# Patient Record
Sex: MALE | Race: WHITE | NOT HISPANIC OR LATINO | Employment: STUDENT | URBAN - METROPOLITAN AREA
[De-identification: names, ages, dates, MRNs, and addresses within clinical notes are randomized per-mention and may not be internally consistent; named-entity substitution may affect disease eponyms.]

---

## 2017-10-20 ENCOUNTER — OFFICE VISIT (OUTPATIENT)
Dept: INTERNAL MEDICINE | Facility: CLINIC | Age: 32
End: 2017-10-20

## 2017-10-20 ENCOUNTER — HOSPITAL ENCOUNTER (OUTPATIENT)
Dept: GENERAL RADIOLOGY | Facility: HOSPITAL | Age: 32
Discharge: HOME | End: 2017-10-20
Admitting: FAMILY MEDICINE

## 2017-10-20 VITALS
SYSTOLIC BLOOD PRESSURE: 120 MMHG | BODY MASS INDEX: 35.68 KG/M2 | WEIGHT: 287 LBS | RESPIRATION RATE: 18 BRPM | OXYGEN SATURATION: 99 % | HEIGHT: 75 IN | DIASTOLIC BLOOD PRESSURE: 80 MMHG

## 2017-10-20 DIAGNOSIS — G47.00 INSOMNIA, UNSPECIFIED TYPE: ICD-10-CM

## 2017-10-20 DIAGNOSIS — F41.8 ANXIETY WITH DEPRESSION: ICD-10-CM

## 2017-10-20 DIAGNOSIS — G47.33 OBSTRUCTIVE SLEEP APNEA: ICD-10-CM

## 2017-10-20 DIAGNOSIS — M54.50 CHRONIC MIDLINE LOW BACK PAIN WITHOUT SCIATICA: Primary | ICD-10-CM

## 2017-10-20 DIAGNOSIS — G89.29 CHRONIC MIDLINE LOW BACK PAIN WITHOUT SCIATICA: Primary | ICD-10-CM

## 2017-10-20 DIAGNOSIS — Z23 NEED FOR INFLUENZA VACCINATION: ICD-10-CM

## 2017-10-20 PROCEDURE — 99204 OFFICE O/P NEW MOD 45 MIN: CPT | Performed by: FAMILY MEDICINE

## 2017-10-20 PROCEDURE — 72110 X-RAY EXAM L-2 SPINE 4/>VWS: CPT

## 2017-10-20 PROCEDURE — 90471 IMMUNIZATION ADMIN: CPT | Performed by: FAMILY MEDICINE

## 2017-10-20 PROCEDURE — 90686 IIV4 VACC NO PRSV 0.5 ML IM: CPT | Performed by: FAMILY MEDICINE

## 2017-10-20 RX ORDER — ALPRAZOLAM 1 MG/1
TABLET ORAL
Refills: 0 | COMMUNITY
Start: 2017-08-30 | End: 2018-07-13

## 2017-10-20 RX ORDER — DEXTROAMPHETAMINE SACCHARATE, AMPHETAMINE ASPARTATE, DEXTROAMPHETAMINE SULFATE AND AMPHETAMINE SULFATE 7.5; 7.5; 7.5; 7.5 MG/1; MG/1; MG/1; MG/1
TABLET ORAL
Refills: 0 | COMMUNITY
Start: 2017-10-04 | End: 2019-02-21 | Stop reason: DRUGHIGH

## 2017-10-20 RX ORDER — ZOLPIDEM TARTRATE 10 MG/1
TABLET ORAL
Refills: 2 | COMMUNITY
Start: 2017-10-13 | End: 2019-03-05 | Stop reason: SDUPTHER

## 2017-10-20 RX ORDER — BUPROPION HYDROCHLORIDE 300 MG/1
TABLET ORAL
Refills: 1 | COMMUNITY
Start: 2017-10-01 | End: 2019-02-21

## 2017-10-20 RX ORDER — HYDROCODONE BITARTRATE AND ACETAMINOPHEN 10; 325 MG/1; MG/1
TABLET ORAL
Refills: 0 | COMMUNITY
Start: 2017-09-12 | End: 2019-02-21

## 2017-10-20 NOTE — PROGRESS NOTES
Subjective   Sergio Lucero is a 32 y.o. male who presents to the clinic to Establish Care      History of Present Illness  Denies having a recent PCP.    Specialists include: Dr. Burnett (psychiatrist), Dr. Henderson (pain specialist), was previously seeing a sleep specialist in [place]  Prescription medications include: Wellbutrin, Ambien, Adderall, Norco, Xanax  OTC medications include: vitamin C    Patient presents to establish care and for chronic disease management.  Past medical history is significant for chronic ADHD (diagnosed in 01/2013), sleep apnea (diagnosed in 06/2014, using CPAP), lumbar back pain (spring 2013), anxiety with depression (diagnosed in 2014/2015, has intermittent panic attacks).  Reports medication compliance and denies any intolerable side effects.  For his anxiety, depression, ADHD and insomnia, reports controlled symptoms on his current regimen.  Denies appetite, weight or libido changes, SI or HI.  Has a follow-up appointment scheduled with his psychiatrist on Monday.  Reports that his insurance recently changed so he is requesting a new referral today.    For his chronic back pain, reports a history of lumbar disc herniation at L4-L5.  Last MRI was done on 09/23/17 and states that his pain specialist also wanted a lumbar xray done. Has done physical therapy and seen a chiropractor in the past, which did not significantly help.  Overall, back pain is well controlled on his current regimen and states that he only takes his Norco 3 times a week.  Denies associated lower extremity weakness, numbness, urinary or bowel incontinence.  Has a follow-up appointment scheduled next Tuesday with his pain specialist.  Reports that his insurance recently changed so he is requesting a new referral today.      For his sleep apnea, has been using his CPAP as previously directed and reports improved sleep and fatigue.  Requesting a referral to a new provider today.    Review of Systems   Constitutional:  Negative for chills, fatigue and fever.   HENT: Negative for congestion, ear pain, rhinorrhea, sinus pressure and sore throat.    Eyes: Negative for pain and visual disturbance.   Respiratory: Negative for cough and shortness of breath.    Cardiovascular: Negative for chest pain and palpitations.   Gastrointestinal: Negative for abdominal pain, constipation, diarrhea, nausea and vomiting.   Genitourinary: Negative for decreased urine volume, dysuria and hematuria.   Musculoskeletal: Positive for back pain (chronic, stable). Negative for gait problem.   Skin: Negative for pallor and rash.   Neurological: Negative for dizziness, syncope and headaches.        Negative for bowel or urinary incontinence.   Psychiatric/Behavioral: Positive for decreased concentration (improved) and sleep disturbance (insomnia, improved). The patient is nervous/anxious (improved).         Positive for depressed mood, improved.     All other systems reviewed and are negative.    Past Medical History:   Diagnosis Date   • ADHD (attention deficit hyperactivity disorder)    • Anxiety    • Depression    • History of ankle fracture     RIGHT   • History of arm fracture     BOTH at separate times   • Lumbar disc herniation     L4-L5   • Obstructive sleep apnea     on CPAP       Family History   Problem Relation Age of Onset   • Heart attack Father 56   • No Known Problems Mother    • No Known Problems Daughter    • No Known Problems Son    • No Known Problems Maternal Grandmother    • No Known Problems Maternal Grandfather    • No Known Problems Paternal Grandmother    • No Known Problems Paternal Grandfather        Past Surgical History:   Procedure Laterality Date   • ADENOIDECTOMY  2009   • MENISCECTOMY Right    • TONSILLECTOMY  2009   • WISDOM TOOTH EXTRACTION  1998       Social History     Social History   • Marital status:      Spouse name: N/A   • Number of children: 4   • Years of education: N/A     Occupational History   • Not on  "file.     Social History Main Topics   • Smoking status: Never Smoker   • Smokeless tobacco: Never Used   • Alcohol use No   • Drug use: No   • Sexual activity: Yes     Partners: Female     Other Topics Concern   • Not on file     Social History Narrative   • No narrative on file         Current Outpatient Prescriptions:   •  ALPRAZolam (XANAX) 1 MG tablet, TAKE 1 TABLET BY MOUTH TWICE A DAY, Disp: , Rfl: 0  •  amphetamine-dextroamphetamine (ADDERALL) 30 MG tablet, TAKE 1/2 TABLET BY MOUTH TWICE A DAY, Disp: , Rfl: 0  •  buPROPion XL (WELLBUTRIN XL) 300 MG 24 hr tablet, TAKE 1 TABLET BY MOUTH DAILY IN THE MORNING, Disp: , Rfl: 1  •  HYDROcodone-acetaminophen (NORCO)  MG per tablet, TAKE 1 TABLET BY MOUTH EVERY DAY AS NEEDED, Disp: , Rfl: 0  •  zolpidem (AMBIEN) 10 MG tablet, TAKE 1 TABLET BY MOUTH AT BEDTIME, Disp: , Rfl: 2    Objective   /80  Resp 18  Ht 75\" (190.5 cm)  Wt 287 lb (130 kg)  SpO2 99%  BMI 35.87 kg/m2     Physical Exam   Constitutional: He is oriented to person, place, and time. He appears well-developed and well-nourished.   No acute distress.   HENT:   Head: Normocephalic and atraumatic.   Right Ear: External ear normal.   Left Ear: External ear normal.   Nose: Nose normal.   Mouth/Throat: Oropharynx is clear and moist.   Eyes: Conjunctivae and EOM are normal. Pupils are equal, round, and reactive to light.   Neck: Normal range of motion. Neck supple.   Cardiovascular: Normal rate, regular rhythm, normal heart sounds and intact distal pulses.    Pulmonary/Chest: Effort normal and breath sounds normal. No respiratory distress. He has no wheezes.   Abdominal: Soft. Bowel sounds are normal. There is no tenderness. There is no CVA tenderness.   Musculoskeletal: Normal range of motion.        Cervical back: Normal.        Thoracic back: Normal.        Lumbar back: He exhibits tenderness (lumbar spine). He exhibits normal range of motion, no swelling and no deformity.   Normal gait. "   Neurological: He is alert and oriented to person, place, and time. He has normal strength. No sensory deficit.   Skin: Skin is warm and dry.   Psychiatric: He has a normal mood and affect. His behavior is normal. Judgment and thought content normal.   Vitals reviewed.      Assessment/Plan   Sergio was seen today for establish care.    Diagnoses and all orders for this visit:    Chronic midline low back pain without sciatica  -     Ambulatory Referral to Pain Management  -     XR Spine Lumbar 4+ View    Will order a referral for pain management and a lumbar spine xray per patient request.    Obstructive sleep apnea  -     Ambulatory Referral to Sleep Medicine    Will order a referral for sleep medicine today to aid with further evaluation and management.    Anxiety with depression  -     Ambulatory Referral to Psychiatry    Will order a referral for psychiatry per patient request.    Insomnia, unspecified type  -     Ambulatory Referral to Psychiatry    Will order a referral for psychiatry per patient request.    Need for influenza vaccination  -     Flu Vaccine Quad PF >18YR

## 2017-10-20 NOTE — PATIENT INSTRUCTIONS
It was nice meeting you today!  I look forward to getting to know you and helping you with your primary care needs.  I have ordered referrals for pain management, psychiatry and a sleep specialist for you.    Please keep your upcoming appointments with pain management and psychiatry.  You will be called to set up an appointment with your sleep specialist.  I have ordered a lumbar spine xray for you.  Please go to the [facility] [place], [place] at [address] to get this done.  You do not need an appointment.  Recommend that you sign up for My Chart (our patient portal).  You will be able to access lab results, request appointments and send our office messages.  If you are interested, please ask for My Chart access information at the check out desk.  Please schedule an appointment for your annual physical exam (if you have not already had one for this year) at your earliest convenience.  Please follow-up as indicated.  Return to the clinic sooner if you have any other concerns.

## 2017-10-24 ENCOUNTER — TELEPHONE (OUTPATIENT)
Dept: INTERNAL MEDICINE | Facility: CLINIC | Age: 32
End: 2017-10-24

## 2017-10-24 NOTE — TELEPHONE ENCOUNTER
Pt was not able to find his report and x ray on my chart so he wants you to print it off so that it can be picked up this morning. Pt needs for pain management this morning before 9:30.

## 2017-12-11 ENCOUNTER — TRANSCRIBE ORDERS (OUTPATIENT)
Dept: ADMINISTRATIVE | Facility: HOSPITAL | Age: 32
End: 2017-12-11

## 2017-12-11 ENCOUNTER — HOSPITAL ENCOUNTER (OUTPATIENT)
Dept: GENERAL RADIOLOGY | Facility: HOSPITAL | Age: 32
Discharge: HOME | End: 2017-12-11
Admitting: PAIN MEDICINE

## 2017-12-11 DIAGNOSIS — M43.16 SPONDYLOLISTHESIS OF LUMBAR REGION: ICD-10-CM

## 2017-12-11 DIAGNOSIS — M47.814 THORACIC SPONDYLOSIS WITHOUT MYELOPATHY: Primary | ICD-10-CM

## 2017-12-11 PROCEDURE — 72120 X-RAY BEND ONLY L-S SPINE: CPT

## 2017-12-11 PROCEDURE — 72072 X-RAY EXAM THORAC SPINE 3VWS: CPT

## 2017-12-18 ENCOUNTER — OFFICE VISIT (OUTPATIENT)
Dept: INTERNAL MEDICINE | Facility: CLINIC | Age: 32
End: 2017-12-18

## 2017-12-18 VITALS
RESPIRATION RATE: 20 BRPM | DIASTOLIC BLOOD PRESSURE: 92 MMHG | HEIGHT: 75 IN | OXYGEN SATURATION: 99 % | SYSTOLIC BLOOD PRESSURE: 132 MMHG | HEART RATE: 79 BPM | BODY MASS INDEX: 34.82 KG/M2 | WEIGHT: 280 LBS

## 2017-12-18 DIAGNOSIS — Z00.00 HEALTH CARE MAINTENANCE: Primary | ICD-10-CM

## 2017-12-18 LAB
ALBUMIN SERPL-MCNC: 4.3 G/DL (ref 3.2–4.8)
ALBUMIN/GLOB SERPL: 1.7 G/DL (ref 1.5–2.5)
ALP SERPL-CCNC: 76 U/L (ref 25–100)
ALT SERPL W P-5'-P-CCNC: 45 U/L (ref 7–40)
ANION GAP SERPL CALCULATED.3IONS-SCNC: 4 MMOL/L (ref 3–11)
ARTICHOKE IGE QN: 79 MG/DL (ref 0–130)
AST SERPL-CCNC: 34 U/L (ref 0–33)
BILIRUB SERPL-MCNC: 0.2 MG/DL (ref 0.3–1.2)
BUN BLD-MCNC: 10 MG/DL (ref 9–23)
BUN/CREAT SERPL: 11.1 (ref 7–25)
CALCIUM SPEC-SCNC: 9.1 MG/DL (ref 8.7–10.4)
CHLORIDE SERPL-SCNC: 107 MMOL/L (ref 99–109)
CHOLEST SERPL-MCNC: 121 MG/DL (ref 0–200)
CO2 SERPL-SCNC: 30 MMOL/L (ref 20–31)
CREAT BLD-MCNC: 0.9 MG/DL (ref 0.6–1.3)
GFR SERPL CREATININE-BSD FRML MDRD: 97 ML/MIN/1.73
GLOBULIN UR ELPH-MCNC: 2.5 GM/DL
GLUCOSE BLD-MCNC: 97 MG/DL (ref 70–100)
HDLC SERPL-MCNC: 34 MG/DL (ref 40–60)
HIV1+2 AB SER QL: NORMAL
POTASSIUM BLD-SCNC: 4.3 MMOL/L (ref 3.5–5.5)
PROT SERPL-MCNC: 6.8 G/DL (ref 5.7–8.2)
SODIUM BLD-SCNC: 141 MMOL/L (ref 132–146)
TRIGL SERPL-MCNC: 107 MG/DL (ref 0–150)

## 2017-12-18 PROCEDURE — 99395 PREV VISIT EST AGE 18-39: CPT | Performed by: FAMILY MEDICINE

## 2017-12-18 PROCEDURE — 80061 LIPID PANEL: CPT | Performed by: FAMILY MEDICINE

## 2017-12-18 PROCEDURE — 80053 COMPREHEN METABOLIC PANEL: CPT | Performed by: FAMILY MEDICINE

## 2017-12-18 PROCEDURE — G0432 EIA HIV-1/HIV-2 SCREEN: HCPCS | Performed by: FAMILY MEDICINE

## 2017-12-18 NOTE — PATIENT INSTRUCTIONS
Please go to the lab before you leave to have your labs drawn.  You will be called or receive a letter with your results.  Things you can do to improve your overall health:  Work towards a normal body weight (BMI between 18.5 and 24.9).  Consume a diet rich in fruits, vegetables and low-fat dairy products with a reduced content of saturated and total fat.  Engage in regular aerobic physical activity, such as brisk walking (at least 30 minutes per day, most days of the week).  Recommend that you consider seeing a dermatologist for annual skin exams.  You will be called to reschedule your sleep specialist appointment.  Please follow-up as indicated.  Return to the clinic sooner if you have any other concerns.

## 2017-12-18 NOTE — PROGRESS NOTES
Subjective   Sergio Lucero is a 32 y.o. male who presents for his yearly preventive exam.  He has no complaints today.    His overall health is: good.  He exercises by walking (every day going to classes, about an hour cummulative).  Immunizations are up to date - Tdap reportedly done in 2013.  PSA was reviewed. He has no increased risk of prostate cancer.  He does see his dentist regularly.  His diet is rare fast food, trying to increase fruits and vegetables intake.  He describes his alcohol intake as none.  His cardiovascular risk is: LDL goal is under 130.  He is sexually active with his wife.  He does not have ED or other bedroom issues.  He does wear a seatbelt regularly.  He does not wear sunscreen regularly, counseled.    The following portions of the patient's history were reviewed and updated as appropriate: allergies, past medical history, past family history, surgeries, current medications, past social history and problem list.    Review of Systems   Constitutional: Negative for chills, fatigue and fever.   HENT: Negative for congestion, ear pain, rhinorrhea, sinus pressure and sore throat.    Eyes: Negative for pain and visual disturbance.   Respiratory: Negative for cough and shortness of breath.    Cardiovascular: Negative for chest pain and palpitations.   Gastrointestinal: Negative for abdominal pain, constipation, diarrhea, nausea and vomiting.   Genitourinary: Negative for decreased urine volume, dysuria and hematuria.   Musculoskeletal: Negative for back pain and gait problem.   Skin: Negative for pallor and rash.   Neurological: Negative for dizziness, syncope and headaches.   Psychiatric/Behavioral: The patient is not nervous/anxious.         Negative for depressed mood.       Past Medical History:   Diagnosis Date   • ADHD (attention deficit hyperactivity disorder)    • Anxiety    • Depression    • History of ankle fracture     RIGHT   • History of arm fracture     BOTH at separate times   •  "Lumbar disc herniation     L4-L5   • Obstructive sleep apnea     on CPAP       Family History   Problem Relation Age of Onset   • Heart attack Father 56   • No Known Problems Mother    • No Known Problems Daughter    • No Known Problems Son    • No Known Problems Maternal Grandmother    • No Known Problems Maternal Grandfather    • No Known Problems Paternal Grandmother    • No Known Problems Paternal Grandfather        Past Surgical History:   Procedure Laterality Date   • ADENOIDECTOMY  2009   • MENISCECTOMY Right    • TONSILLECTOMY  2009   • WISDOM TOOTH EXTRACTION  1998       Social History     Social History   • Marital status:      Spouse name: N/A   • Number of children: 4   • Years of education: N/A     Occupational History   • Not on file.     Social History Main Topics   • Smoking status: Never Smoker   • Smokeless tobacco: Never Used   • Alcohol use No   • Drug use: No   • Sexual activity: Yes     Partners: Female     Other Topics Concern   • Not on file     Social History Narrative   • No narrative on file         Current Outpatient Prescriptions:   •  ALPRAZolam (XANAX) 1 MG tablet, TAKE 1 TABLET BY MOUTH TWICE A DAY, Disp: , Rfl: 0  •  amphetamine-dextroamphetamine (ADDERALL) 30 MG tablet, TAKE 1/2 TABLET BY MOUTH TWICE A DAY, Disp: , Rfl: 0  •  buPROPion XL (WELLBUTRIN XL) 300 MG 24 hr tablet, TAKE 1 TABLET BY MOUTH DAILY IN THE MORNING, Disp: , Rfl: 1  •  HYDROcodone-acetaminophen (NORCO)  MG per tablet, TAKE 1 TABLET BY MOUTH EVERY DAY AS NEEDED, Disp: , Rfl: 0  •  zolpidem (AMBIEN) 10 MG tablet, TAKE 1 TABLET BY MOUTH AT BEDTIME, Disp: , Rfl: 2    Objective   /92  Pulse 79  Resp 20  Ht 190.5 cm (75\")  Wt 127 kg (280 lb)  SpO2 99%  BMI 35 kg/m2     Physical Exam   Constitutional: He is oriented to person, place, and time. He appears well-developed and well-nourished.   No acute distress.   HENT:   Head: Normocephalic and atraumatic.   Right Ear: Hearing, tympanic membrane, " external ear and ear canal normal.   Left Ear: Hearing, tympanic membrane, external ear and ear canal normal.   Nose: Nose normal.   Mouth/Throat: Oropharynx is clear and moist and mucous membranes are normal.   Eyes: Conjunctivae and EOM are normal. Pupils are equal, round, and reactive to light.   Neck: Normal range of motion. Neck supple. No thyromegaly present.   Cardiovascular: Normal rate, regular rhythm, normal heart sounds and intact distal pulses.    Pulmonary/Chest: Effort normal and breath sounds normal. No respiratory distress. He has no wheezes.   Abdominal: Soft. Bowel sounds are normal. There is no tenderness. There is no CVA tenderness and negative Thurston's sign.   Genitourinary:   Genitourinary Comments: Deferred.   Musculoskeletal: Normal range of motion.        Cervical back: Normal.        Thoracic back: Normal.        Lumbar back: Normal.   Normal gait.   Neurological: He is alert and oriented to person, place, and time. He has normal strength. No cranial nerve deficit or sensory deficit.   Skin: Skin is warm and dry.   Psychiatric: He has a normal mood and affect. His behavior is normal. Judgment and thought content normal.   Vitals reviewed.      Assessment/Plan   Sergio was seen today for annual exam.    Diagnoses and all orders for this visit:    Health care maintenance  -     Comprehensive Metabolic Panel  -     HIV-1 / O / 2 Ag / Antibody 4th Generation  -     Lipid Panel    The above labs were ordered today.  Immunizations are up to date.  Next annual exam in 1 year.    Counseled regarding: age-appropriate screening labs and tests, wearing seatbelt and sunscreen regularly  Discussed: regular exercise and diet changes to promote weight loss, seeing a dermatologist for annual skin exams

## 2018-02-09 ENCOUNTER — CONSULT (OUTPATIENT)
Dept: SLEEP MEDICINE | Facility: HOSPITAL | Age: 33
End: 2018-02-09

## 2018-02-09 VITALS
HEART RATE: 98 BPM | WEIGHT: 285 LBS | HEIGHT: 75 IN | BODY MASS INDEX: 35.43 KG/M2 | DIASTOLIC BLOOD PRESSURE: 72 MMHG | OXYGEN SATURATION: 97 % | SYSTOLIC BLOOD PRESSURE: 118 MMHG

## 2018-02-09 DIAGNOSIS — G47.33 OBSTRUCTIVE SLEEP APNEA: Primary | ICD-10-CM

## 2018-02-09 DIAGNOSIS — E66.09 CLASS 2 OBESITY DUE TO EXCESS CALORIES WITHOUT SERIOUS COMORBIDITY WITH BODY MASS INDEX (BMI) OF 35.0 TO 35.9 IN ADULT: ICD-10-CM

## 2018-02-09 PROCEDURE — 99203 OFFICE O/P NEW LOW 30 MIN: CPT | Performed by: INTERNAL MEDICINE

## 2018-02-09 RX ORDER — LISDEXAMFETAMINE DIMESYLATE 60 MG/1
CAPSULE ORAL
Refills: 0 | COMMUNITY
Start: 2018-01-11 | End: 2019-02-21 | Stop reason: DRUGHIGH

## 2018-02-09 NOTE — PROGRESS NOTES
Subjective   Sergio Lucero is a 32 y.o. male is being seen for consultation today at the request of Jaimee Adames MD for the evaluation of snoring and obstructive sleep apnea.    History of Present Illness  The patient says his had snoring almost all of his life.  He had a sleep study in [place] in 2014 diagnosed with obstructive sleep apnea.  He has been on CPAP since then.  He says he does very well with the CPAP and uses it nightly.  Never tries to sleep without it.  He denies having any known snoring when he is wearing the mask.  He says his weight is roughly the same.  He moved to [place] a year ago and needs new supplies.  He denies being sleepy during the day.  He denies any problems while driving.    Without machine he does snore loudly and snores in all positions he is awakened with a dry mouth and gasping.  He awakened with a sore throat.  He denies ever breaking his nose.  He has a history of reflux but says he has been a problem recently.  He is not on medications.  He denies hypnagogic hallucinations sleep paralysis or cataplexy.  He denies kicking and jerking his legs at night.  He does have some chronic back pain and is seen by pain management.    He goes to bed at 10 PM will fall asleep within 30 minutes to an hour.  He awakens once during the night.  He thinks he gets about 7 hours of sleep.  He says he feels tired even using machine but attributes this to having 4 young children in the household.  He denies any history of hypertension diabetes coronary artery disease.  Does have history of ADHD.      He has food allergies and environmental allergies.      Current Outpatient Prescriptions:   •  ALPRAZolam (XANAX) 1 MG tablet, TAKE 1 TABLET BY MOUTH TWICE A DAY, Disp: , Rfl: 0  •  amphetamine-dextroamphetamine (ADDERALL) 30 MG tablet, TAKE 1/2 TABLET BY MOUTH TWICE A DAY, Disp: , Rfl: 0  •  buPROPion XL (WELLBUTRIN XL) 300 MG 24 hr tablet, TAKE 1 TABLET BY MOUTH DAILY IN THE MORNING, Disp: , Rfl:  1  •  HYDROcodone-acetaminophen (NORCO)  MG per tablet, TAKE 1 TABLET BY MOUTH EVERY DAY AS NEEDED, Disp: , Rfl: 0  •  VYVANSE 60 MG capsule, TAKE ONE CAPSULE BY MOUTH EVERY DAY, Disp: , Rfl: 0  •  zolpidem (AMBIEN) 10 MG tablet, TAKE 1 TABLET BY MOUTH AT BEDTIME, Disp: , Rfl: 2    Smoking status: Never Smoker                                                              Smokeless status: Never Used                           History   Alcohol Use No       Caffeine:He has one cola Per day.    Past Medical History:   Diagnosis Date   • ADHD (attention deficit hyperactivity disorder)    • Anxiety    • Depression    • History of ankle fracture     RIGHT   • History of arm fracture     BOTH at separate times   • Lumbar disc herniation     L4-L5   • Obstructive sleep apnea     on CPAP       Past Surgical History:   Procedure Laterality Date   • ADENOIDECTOMY  2009   • MENISCECTOMY Right    • TONSILLECTOMY  2009   • WISDOM TOOTH EXTRACTION  1998       Family History   Problem Relation Age of Onset   • Heart attack Father 56   • No Known Problems Mother    • No Known Problems Daughter    • No Known Problems Son    • No Known Problems Maternal Grandmother    • No Known Problems Maternal Grandfather    • No Known Problems Paternal Grandmother    • No Known Problems Paternal Grandfather    Family history is positive for heart disease and hypertension as well as sleep apnea.    The following portions of the patient's history were reviewed and updated as appropriate: allergies, current medications, past family history, past medical history, past social history, past surgical history and problem list.    Review of Systems   Constitutional: Positive for fatigue.   HENT: Negative.    Eyes: Negative.    Respiratory: Negative.    Cardiovascular: Negative.    Gastrointestinal: Negative.    Endocrine: Negative.    Genitourinary: Negative.    Musculoskeletal: Positive for back pain.   Skin: Negative.    Allergic/Immunologic:  "Positive for environmental allergies.   Neurological: Negative.    Hematological: Negative.    Psychiatric/Behavioral: Positive for dysphoric mood. The patient is nervous/anxious.     [place] scores 3/24    Objective     /72  Pulse 98  Ht 190.5 cm (75\")  Wt 129 kg (285 lb)  SpO2 97%  BMI 35.62 kg/m2     Physical Exam   Constitutional: He is oriented to person, place, and time. He appears well-developed and well-nourished.   He is obese.   HENT:   Head: Normocephalic and atraumatic.   He has nasal airway narrowing and Mallampati class II anatomy.   Eyes: EOM are normal. Pupils are equal, round, and reactive to light.   Neck: Normal range of motion.   Cardiovascular: Normal rate, regular rhythm and normal heart sounds.    Pulmonary/Chest: Effort normal and breath sounds normal.   Abdominal: Soft. Bowel sounds are normal.   Musculoskeletal: Normal range of motion. He exhibits no edema.   Neurological: He is alert and oriented to person, place, and time.   Skin: Skin is warm and dry.   Psychiatric: He has a normal mood and affect. His behavior is normal.    sleep study from 2014 showed an AHI of 28.  He had a titration study then that titrated to CPAP of 9.  He was has been on an AutoSet of 6-12.      Assessment/Plan   Sergio was seen today for sleeping problem.    Diagnoses and all orders for this visit:    Obstructive sleep apnea  -     Detailed AutoPAP Order    Class 2 obesity due to excess calories without serious comorbidity with body mass index (BMI) of 35.0 to 35.9 in adult     patient apparently does have moderate obstructive sleep apnea but is been using his CPAP regularly.  We will encourage him to continue using it.  We will write an order for new supplies and continue on his current pressures.  We'll plan to see him back in 1 year.  He is encouraged to lose weight.  He is encouraged to avoid alcohol and sedatives close to bedtime.  He is encouraged practice lateral position sleep.  He is to contact " us if symptoms worsen.         Sergio Lorenz MD [facility]  Sleep Medicine  Pulmonary and Critical Care Medicine

## 2018-07-13 ENCOUNTER — OFFICE VISIT (OUTPATIENT)
Dept: INTERNAL MEDICINE | Facility: CLINIC | Age: 33
End: 2018-07-13

## 2018-07-13 VITALS
HEIGHT: 75 IN | RESPIRATION RATE: 16 BRPM | DIASTOLIC BLOOD PRESSURE: 80 MMHG | WEIGHT: 296 LBS | BODY MASS INDEX: 36.8 KG/M2 | SYSTOLIC BLOOD PRESSURE: 118 MMHG

## 2018-07-13 DIAGNOSIS — G89.29 CHRONIC MIDLINE THORACIC BACK PAIN: Primary | ICD-10-CM

## 2018-07-13 DIAGNOSIS — M54.6 CHRONIC MIDLINE THORACIC BACK PAIN: Primary | ICD-10-CM

## 2018-07-13 PROCEDURE — 99213 OFFICE O/P EST LOW 20 MIN: CPT | Performed by: FAMILY MEDICINE

## 2018-07-13 NOTE — PATIENT INSTRUCTIONS
I have ordered an orthopedic surgery referral for you.  You will be called to set up an appointment with this specialist.  Please follow-up as indicated.  Return to the clinic sooner if you have any other concerns.

## 2018-07-13 NOTE — PROGRESS NOTES
"Subjective   Sergio Lucero is a 33 y.o. male who presents with complaint of Back Pain      History of Present Illness   Patient presents with complaint of worsening, chronic upper back pain.  Symptoms initially started 5 years ago.  Describes a localized midline pain, non-radiating, worsens with sitting too long, bending down, going from a sitting to standing position.  Currently taking Norco and states that it helps for a few hours, but then pain will recur.  Has done physical therapy in the past, which did not significantly help.  Denies associated swelling, bruising, erythema, extremity weakness or numbness.  Has been seeing a pain specialist for his chronic lower back pain, which has improved, but noticed worsening upper back pain afterwards.  Patient is requesting to see an orthopedic surgeon for further evaluation.  Had a thoracic spine xray done on 12/11/17, which was unremarkable.    Review of Systems   Constitutional: Negative for chills and fever.   Cardiovascular: Negative for chest pain.   Gastrointestinal: Negative for abdominal pain, constipation, diarrhea, nausea and vomiting.   Genitourinary: Negative for dysuria, flank pain and hematuria.   Musculoskeletal: Positive for back pain.   Skin: Negative for color change.   Neurological: Negative for weakness, numbness and headaches.        Negative for urinary or bowel incontinence.     The following portions of the patient's history were reviewed and updated as appropriate: current medications, past medical history, past surgical history and problem list.    Objective   /80   Resp 16   Ht 190.5 cm (75\")   Wt 134 kg (296 lb)   BMI 37.00 kg/m²      Physical Exam   Constitutional: He is oriented to person, place, and time. He appears well-developed and well-nourished.   No acute distress.   HENT:   Head: Normocephalic and atraumatic.   Eyes: Conjunctivae and EOM are normal.   Neck: Normal range of motion.   Cardiovascular: Normal rate, regular " rhythm, normal heart sounds and intact distal pulses.    Pulmonary/Chest: Effort normal and breath sounds normal. He has no wheezes.   Abdominal: Soft. There is no tenderness.   Musculoskeletal: Normal range of motion.        Thoracic back: He exhibits tenderness. He exhibits normal range of motion, no swelling, no edema and no deformity.   Moves all extremities.   Neurological: He is alert and oriented to person, place, and time. He has normal strength. No sensory deficit.   Skin: Skin is warm and dry.   Psychiatric: He has a normal mood and affect. His behavior is normal.   Vitals reviewed.      Assessment/Plan   Sergio was seen today for back pain.    Diagnoses and all orders for this visit:    Chronic midline thoracic back pain  -     Ambulatory Referral to Orthopedic Surgery    Will order a referral for orthopedic surgery today, per patient request, to aid with further evaluation and management.

## 2019-02-21 ENCOUNTER — OFFICE VISIT (OUTPATIENT)
Dept: INTERNAL MEDICINE | Facility: CLINIC | Age: 34
End: 2019-02-21

## 2019-02-21 VITALS
BODY MASS INDEX: 36.31 KG/M2 | WEIGHT: 292 LBS | SYSTOLIC BLOOD PRESSURE: 130 MMHG | DIASTOLIC BLOOD PRESSURE: 82 MMHG | TEMPERATURE: 97.2 F | HEIGHT: 75 IN

## 2019-02-21 DIAGNOSIS — F41.8 ANXIETY WITH DEPRESSION: Primary | ICD-10-CM

## 2019-02-21 DIAGNOSIS — G47.00 INSOMNIA, UNSPECIFIED TYPE: ICD-10-CM

## 2019-02-21 DIAGNOSIS — F90.9 ATTENTION DEFICIT HYPERACTIVITY DISORDER (ADHD), UNSPECIFIED ADHD TYPE: ICD-10-CM

## 2019-02-21 DIAGNOSIS — Z23 INFLUENZA VACCINATION ADMINISTERED AT CURRENT VISIT: ICD-10-CM

## 2019-02-21 PROCEDURE — 90686 IIV4 VACC NO PRSV 0.5 ML IM: CPT | Performed by: INTERNAL MEDICINE

## 2019-02-21 PROCEDURE — 99214 OFFICE O/P EST MOD 30 MIN: CPT | Performed by: INTERNAL MEDICINE

## 2019-02-21 PROCEDURE — 90471 IMMUNIZATION ADMIN: CPT | Performed by: INTERNAL MEDICINE

## 2019-02-21 RX ORDER — DEXTROAMPHETAMINE SACCHARATE, AMPHETAMINE ASPARTATE, DEXTROAMPHETAMINE SULFATE AND AMPHETAMINE SULFATE 5; 5; 5; 5 MG/1; MG/1; MG/1; MG/1
1 TABLET ORAL
Refills: 0 | COMMUNITY
Start: 2019-02-15 | End: 2019-03-15 | Stop reason: SDUPTHER

## 2019-02-21 RX ORDER — BUPROPION HYDROCHLORIDE 150 MG/1
150 TABLET ORAL EVERY MORNING
COMMUNITY
End: 2019-03-05 | Stop reason: SDUPTHER

## 2019-02-21 RX ORDER — HYDROCODONE BITARTRATE AND ACETAMINOPHEN 10; 325 MG/1; MG/1
2 TABLET ORAL 2 TIMES DAILY PRN
COMMUNITY

## 2019-02-21 NOTE — PROGRESS NOTES
"Subjective   Sergio Lucero is a 33 y.o. male here for follow-up A&D, insomnia, ADD. He had been seeing a psychiatrist but he has gone to inpt only. He is going to  psych right now but doesn't really care for the clinic. He has been on vyvanse which was increased from 60 to 70mg last fall and the extender adderall 20mg which he takes a few times a week. Doesn't take on weekends or holidays/vacations. He takes ambien for insomnia which works well. No daytime somnolence. He also has A&D and is on wellbutrin which was also decreased recently as his mood was so good. He has 4 kids from 3yo down. 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 Has his ADRIENNE.      The following portions of the patient's history were reviewed and updated as appropriate: allergies, current medications, past medical history, past social history, past surgical history and problem list.    Review of Systems:  General: negative  CV: negative  Respiratory: negative  Neuro: negative  Psych: see hpi  MSK: chronic back pain    Objective   /82 (BP Location: Left arm, Patient Position: Sitting, Cuff Size: Adult)   Temp 97.2 °F (36.2 °C) (Temporal)   Ht 190.5 cm (75\")   Wt 132 kg (292 lb)   BMI 36.50 kg/m²     Physical Exam   Constitutional: He is oriented to person, place, and time. He appears well-developed and well-nourished.   Cardiovascular: Normal rate, regular rhythm and normal heart sounds.   Pulmonary/Chest: Effort normal and breath sounds normal. He has no wheezes. He has no rales.   Neurological: He is alert and oriented to person, place, and time.   Skin: Skin is warm and dry.   Psychiatric: He has a normal mood and affect. His behavior is normal. Thought content normal.   Vitals reviewed.      Assessment/Plan   Sergio was seen today for anxiety, depression and adhd.    Diagnoses and all orders for this visit:    Anxiety with depression  -continue wellbutrin    Attention deficit hyperactivity disorder (ADHD), unspecified ADHD type  -continue " vyvanse+adderall extender  The patient has read and signed the [facility] Controlled Substance Contract.  I will continue to see patient for regular follow up appointments.  They are well controlled on their medication.  QUEENIE is updated every 3 months. The patient is aware of the potential for addiction and dependence.    Insomnia, unspecified type  -continue ambien    Influenza vaccination administered at current visit  -     Fluarix/Fluzone/Afluria Quad/FluLaval Quad

## 2019-03-04 ENCOUNTER — OFFICE VISIT (OUTPATIENT)
Dept: INTERNAL MEDICINE | Facility: CLINIC | Age: 34
End: 2019-03-04

## 2019-03-04 VITALS — HEIGHT: 75 IN | WEIGHT: 292 LBS | TEMPERATURE: 97.8 F | BODY MASS INDEX: 36.31 KG/M2

## 2019-03-04 DIAGNOSIS — G47.00 INSOMNIA, UNSPECIFIED TYPE: ICD-10-CM

## 2019-03-04 DIAGNOSIS — M54.50 CHRONIC MIDLINE LOW BACK PAIN WITHOUT SCIATICA: Primary | ICD-10-CM

## 2019-03-04 DIAGNOSIS — F98.8 ATTENTION DEFICIT DISORDER (ADD) WITHOUT HYPERACTIVITY: ICD-10-CM

## 2019-03-04 DIAGNOSIS — G89.29 CHRONIC MIDLINE LOW BACK PAIN WITHOUT SCIATICA: Primary | ICD-10-CM

## 2019-03-04 PROCEDURE — 99213 OFFICE O/P EST LOW 20 MIN: CPT | Performed by: INTERNAL MEDICINE

## 2019-03-04 NOTE — PROGRESS NOTES
"Subjective   Sergio Lucero is a 33 y.o. male here for follow-up chronic back pain. He has had this for several years and is established with pain mgmt. He recently trialed a month of percocet but didn't like it so went back to [place]. He was concerned that his UDS from here showed \"inconsistencies\" and he wants to clarify. He brings his bottle in for review. Of note, he is doing well on his ambien and vyvanse for insomnia and ADD, respectively.       The following portions of the patient's history were reviewed and updated as appropriate: allergies, current medications, past medical history, past social history, past surgical history and problem list.    Review of Systems:  General: negative  CV: negative  Respiratory: negative  Neuro: negative  MSK: chronic back pain    Objective   Temp 97.8 °F (36.6 °C) (Temporal)   Ht 190.5 cm (75\")   Wt 132 kg (292 lb)   BMI 36.50 kg/m²     Physical Exam   Constitutional: He is oriented to person, place, and time. He appears well-developed and well-nourished.   Cardiovascular: Normal rate, regular rhythm and normal heart sounds.   Pulmonary/Chest: Effort normal and breath sounds normal. He has no wheezes. He has no rales.   Neurological: He is alert and oriented to person, place, and time.   Skin: Skin is warm and dry.   Psychiatric: He has a normal mood and affect. His behavior is normal. Thought content normal.   Vitals reviewed.      Assessment/Plan   Sergio was seen today for follow-up.    Diagnoses and all orders for this visit:    Chronic midline low back pain without sciatica  -continue Norco, seeing pain mgmt    Insomnia  -continue ambien    ADD  -continue Vyvanse  -queenie reviewed an appropriate.  The patient has read and signed the [facility] Controlled Substance Contract.  I will continue to see patient for regular follow up appointments.  They are well controlled on their medication.  QUEENIE is updated every 3 months. The patient is aware of the potential for " addiction and dependence.

## 2019-03-05 RX ORDER — ZOLPIDEM TARTRATE 10 MG/1
10 TABLET ORAL
Qty: 30 TABLET | Refills: 2 | Status: SHIPPED | OUTPATIENT
Start: 2019-03-05 | End: 2019-04-24 | Stop reason: SDUPTHER

## 2019-03-05 RX ORDER — BUPROPION HYDROCHLORIDE 150 MG/1
150 TABLET ORAL EVERY MORNING
Qty: 30 TABLET | Refills: 2 | Status: SHIPPED | OUTPATIENT
Start: 2019-03-05 | End: 2019-04-24 | Stop reason: SDUPTHER

## 2019-03-15 RX ORDER — DEXTROAMPHETAMINE SACCHARATE, AMPHETAMINE ASPARTATE, DEXTROAMPHETAMINE SULFATE AND AMPHETAMINE SULFATE 5; 5; 5; 5 MG/1; MG/1; MG/1; MG/1
1 TABLET ORAL DAILY
Qty: 30 TABLET | Refills: 0 | Status: SHIPPED | OUTPATIENT
Start: 2019-03-15 | End: 2019-04-14 | Stop reason: SDUPTHER

## 2019-04-16 RX ORDER — DEXTROAMPHETAMINE SACCHARATE, AMPHETAMINE ASPARTATE, DEXTROAMPHETAMINE SULFATE AND AMPHETAMINE SULFATE 5; 5; 5; 5 MG/1; MG/1; MG/1; MG/1
1 TABLET ORAL DAILY
Qty: 30 TABLET | Refills: 0 | Status: SHIPPED | OUTPATIENT
Start: 2019-04-16 | End: 2019-05-16 | Stop reason: SDUPTHER

## 2019-04-23 RX ORDER — BUPROPION HYDROCHLORIDE 150 MG/1
150 TABLET ORAL EVERY MORNING
Qty: 30 TABLET | Refills: 2 | Status: CANCELLED | OUTPATIENT
Start: 2019-04-23

## 2019-04-23 RX ORDER — ZOLPIDEM TARTRATE 10 MG/1
10 TABLET ORAL
Qty: 30 TABLET | Refills: 2 | Status: CANCELLED | OUTPATIENT
Start: 2019-04-23

## 2019-04-24 RX ORDER — ZOLPIDEM TARTRATE 10 MG/1
10 TABLET ORAL
Qty: 30 TABLET | Refills: 2 | Status: SHIPPED | OUTPATIENT
Start: 2019-04-24 | End: 2019-05-16 | Stop reason: SDUPTHER

## 2019-04-24 RX ORDER — BUPROPION HYDROCHLORIDE 150 MG/1
150 TABLET ORAL EVERY MORNING
Qty: 30 TABLET | Refills: 2 | Status: SHIPPED | OUTPATIENT
Start: 2019-04-24 | End: 2019-05-16

## 2019-05-16 ENCOUNTER — OFFICE VISIT (OUTPATIENT)
Dept: INTERNAL MEDICINE | Facility: CLINIC | Age: 34
End: 2019-05-16

## 2019-05-16 VITALS
BODY MASS INDEX: 36.18 KG/M2 | HEIGHT: 75 IN | SYSTOLIC BLOOD PRESSURE: 120 MMHG | DIASTOLIC BLOOD PRESSURE: 74 MMHG | WEIGHT: 291 LBS | HEART RATE: 72 BPM

## 2019-05-16 DIAGNOSIS — M47.817 LUMBOSACRAL SPONDYLOSIS WITHOUT MYELOPATHY: ICD-10-CM

## 2019-05-16 DIAGNOSIS — F33.1 MODERATE EPISODE OF RECURRENT MAJOR DEPRESSIVE DISORDER (HCC): Primary | ICD-10-CM

## 2019-05-16 DIAGNOSIS — F98.8 ATTENTION DEFICIT DISORDER (ADD) WITHOUT HYPERACTIVITY: Primary | ICD-10-CM

## 2019-05-16 DIAGNOSIS — F41.9 ANXIETY: ICD-10-CM

## 2019-05-16 DIAGNOSIS — F98.8 ADD (ATTENTION DEFICIT DISORDER) WITHOUT HYPERACTIVITY: ICD-10-CM

## 2019-05-16 DIAGNOSIS — G47.00 INSOMNIA, UNSPECIFIED TYPE: ICD-10-CM

## 2019-05-16 DIAGNOSIS — M47.814 THORACIC SPONDYLOSIS WITHOUT MYELOPATHY: ICD-10-CM

## 2019-05-16 RX ORDER — BUPROPION HYDROCHLORIDE 300 MG/1
300 TABLET ORAL EVERY MORNING
Qty: 30 TABLET | Refills: 2 | Status: SHIPPED | OUTPATIENT
Start: 2019-05-16 | End: 2019-09-10 | Stop reason: SDUPTHER

## 2019-05-16 RX ORDER — ZOLPIDEM TARTRATE 10 MG/1
10 TABLET ORAL
Qty: 30 TABLET | Refills: 0 | Status: SHIPPED | OUTPATIENT
Start: 2019-05-16 | End: 2019-10-11 | Stop reason: SDUPTHER

## 2019-05-16 RX ORDER — DEXTROAMPHETAMINE SACCHARATE, AMPHETAMINE ASPARTATE, DEXTROAMPHETAMINE SULFATE AND AMPHETAMINE SULFATE 5; 5; 5; 5 MG/1; MG/1; MG/1; MG/1
1 TABLET ORAL DAILY
Qty: 30 TABLET | Refills: 0 | Status: SHIPPED | OUTPATIENT
Start: 2019-05-16 | End: 2019-06-17 | Stop reason: SDUPTHER

## 2019-05-16 NOTE — PROGRESS NOTES
Subjective   Sergio Lucero is a 33 y.o. male here today for follow up on ADD and worsening of depression and anxiety. The Adderall and Vyvanse have continued to help his ADD symptoms. He experiences some inattentiveness but is able to concentrate well. He mainly takes these medications during the week and stops them during the weekend. His Wellbutrin dose was recently decreased and he has some worsening of depression and anxiety. 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 He is excited but having some anxiety about this. He would like to increase Wellbutrin back up to 300mg daily. He denies any thoughts of self harm. He continues to sleep well well when taking ambien. He continues to see pain management for lumbosacral and thoracic spondylosis. He still frequently experiences pain and discomfort which also worsens his anxiety and depression. He denies any shortness of breath or chest pain.      Chief Complaint   Patient presents with   • Pain   • ADD       Review of Systems   Constitutional: Positive for fatigue. Negative for activity change, appetite change, chills, diaphoresis, fever, unexpected weight gain and unexpected weight loss.   HENT: Negative for congestion, ear discharge, ear pain, mouth sores, nosebleeds, sinus pressure, sneezing and sore throat.    Eyes: Negative for pain, discharge and itching.   Respiratory: Negative for cough, chest tightness, shortness of breath and wheezing.    Cardiovascular: Negative for chest pain, palpitations and leg swelling.   Gastrointestinal: Negative for abdominal pain, constipation, diarrhea, nausea and vomiting.   Endocrine: Negative for heat intolerance, polydipsia and polyphagia.   Genitourinary: Negative for dysuria, flank pain, frequency, hematuria and urgency.   Musculoskeletal: Positive for arthralgias, back pain, neck pain and neck stiffness. Negative for gait problem, joint swelling and myalgias.   Skin: Negative for  color change, pallor and rash.   Allergic/Immunologic: Negative for immunocompromised state.   Neurological: Negative for dizziness, tremors, seizures, speech difficulty, weakness, light-headedness, numbness and headache.   Hematological: Negative for adenopathy.   Psychiatric/Behavioral: Positive for dysphoric mood, sleep disturbance and depressed mood. Negative for agitation, behavioral problems, decreased concentration and suicidal ideas. The patient is nervous/anxious.        Past Medical History:   Diagnosis Date   • ADHD (attention deficit hyperactivity disorder)    • Anxiety    • Depression    • History of ankle fracture     RIGHT   • History of arm fracture     BOTH at separate times   • Lumbar disc herniation     L4-L5   • Obstructive sleep apnea     on CPAP     Past Surgical History:   Procedure Laterality Date   • ADENOIDECTOMY  2009   • MENISCECTOMY Right    • TONSILLECTOMY  2009   • WISDOM TOOTH EXTRACTION  1998     Family History   Problem Relation Age of Onset   • Heart attack Father 56   • No Known Problems Mother    • No Known Problems Daughter    • No Known Problems Son    • No Known Problems Maternal Grandmother    • No Known Problems Maternal Grandfather    • No Known Problems Paternal Grandmother    • No Known Problems Paternal Grandfather      Social History     Socioeconomic History   • Marital status:      Spouse name: Not on file   • Number of children: 4   • Years of education: Not on file   • Highest education level: Not on file   Tobacco Use   • Smoking status: Never Smoker   • Smokeless tobacco: Never Used   Substance and Sexual Activity   • Alcohol use: No   • Drug use: No   • Sexual activity: Yes     Partners: Female     Allergies   Allergen Reactions   • Fish-Derived Products Anaphylaxis   • Nuts Anaphylaxis   • Shellfish Allergy Anaphylaxis         Current Outpatient Medications:   •  amphetamine-dextroamphetamine (ADDERALL) 20 MG tablet, Take 1 tablet by mouth Daily., Disp:  "30 tablet, Rfl: 0  •  buPROPion XL (WELLBUTRIN XL) 300 MG 24 hr tablet, Take 1 tablet by mouth Every Morning., Disp: 30 tablet, Rfl: 2  •  HYDROcodone-acetaminophen (NORCO)  MG per tablet, As Needed., Disp: , Rfl:   •  lisdexamfetamine (VYVANSE) 70 MG capsule, Take 1 capsule by mouth Daily, Disp: 30 capsule, Rfl: 0  •  zolpidem (AMBIEN) 10 MG tablet, Take 1 tablet by mouth every night at bedtime., Disp: 30 tablet, Rfl: 0    Objective   Vitals:    05/16/19 1005   BP: 120/74   BP Location: Left arm   Patient Position: Sitting   Pulse: 72   Weight: 132 kg (291 lb)   Height: 190.5 cm (75\")     Body mass index is 36.37 kg/m².    Physical Exam   Constitutional: He is oriented to person, place, and time. He appears well-developed and well-nourished. No distress.   HENT:   Head: Normocephalic and atraumatic.   Eyes: EOM are normal. Pupils are equal, round, and reactive to light.   Neck: Normal range of motion.   Cardiovascular: Normal rate, regular rhythm and normal heart sounds.   Pulmonary/Chest: Effort normal and breath sounds normal. No respiratory distress.   Abdominal: Soft. Bowel sounds are normal.   Musculoskeletal:        Thoracic back: He exhibits decreased range of motion and tenderness.        Lumbar back: He exhibits decreased range of motion and tenderness.   Neurological: He is alert and oriented to person, place, and time. No cranial nerve deficit.   Skin: Skin is warm and dry.   Psychiatric: His speech is normal and behavior is normal. Judgment and thought content normal. His mood appears anxious. Cognition and memory are normal. He exhibits a depressed mood. He is inattentive.   Nursing note and vitals reviewed.      Assessment/Plan   Problem List Items Addressed This Visit        Musculoskeletal and Integument    Lumbosacral spondylosis without myelopathy    Thoracic spondylosis without myelopathy       Other    Insomnia    Relevant Medications    zolpidem (AMBIEN) 10 MG tablet -  Continue    "   Other Visit Diagnoses     Moderate episode of recurrent major depressive disorder (CMS/[facility])    -  Primary    Relevant Medications    buPROPion XL (WELLBUTRIN XL) 300 MG 24 hr tablet - dosage increased    Anxiety        Relevant Medications    buPROPion XL (WELLBUTRIN XL) 300 MG 24 hr tablet - dosage increased    ADD (attention deficit disorder) without hyperactivity        Relevant Medications    zolpidem (AMBIEN) 10 MG tablet - continue     buPROPion XL (WELLBUTRIN XL) 300 MG 24 hr tablet - dosage increased  Adderall and Vvyanse were both refilled - continue                 Plan of care reviewed with the patient at the conclusion of today's visit.  Education was provided regarding diagnosis, management, and any prescribed or recommended OTC medications.  Patient verbalized understanding of and agreement with management plan.     Return in about 3 months (around 8/16/2019), or if symptoms worsen or fail to improve, for Next scheduled follow up.      Romi Rios, APRN

## 2019-05-17 PROBLEM — G89.4 CHRONIC PAIN DISORDER: Status: ACTIVE | Noted: 2019-03-14

## 2019-05-17 PROBLEM — M47.814 THORACIC SPONDYLOSIS WITHOUT MYELOPATHY: Status: ACTIVE | Noted: 2018-08-16

## 2019-05-17 PROBLEM — M47.817 LUMBOSACRAL SPONDYLOSIS WITHOUT MYELOPATHY: Status: ACTIVE | Noted: 2018-08-16

## 2019-05-17 PROBLEM — M54.6 THORACIC BACK PAIN: Status: ACTIVE | Noted: 2018-08-16

## 2019-05-20 NOTE — PROGRESS NOTES
I have reviewed the notes, assessments, and/or procedures performed by DARVIN Mejia and I concur with her documentation of Sergio Lucero.

## 2019-06-16 DIAGNOSIS — F98.8 ATTENTION DEFICIT DISORDER (ADD) WITHOUT HYPERACTIVITY: ICD-10-CM

## 2019-06-16 RX ORDER — DEXTROAMPHETAMINE SACCHARATE, AMPHETAMINE ASPARTATE, DEXTROAMPHETAMINE SULFATE AND AMPHETAMINE SULFATE 5; 5; 5; 5 MG/1; MG/1; MG/1; MG/1
1 TABLET ORAL DAILY
Qty: 30 TABLET | Refills: 0 | Status: CANCELLED | OUTPATIENT
Start: 2019-06-16

## 2019-06-17 DIAGNOSIS — F98.8 ATTENTION DEFICIT DISORDER (ADD) WITHOUT HYPERACTIVITY: ICD-10-CM

## 2019-06-17 RX ORDER — DEXTROAMPHETAMINE SACCHARATE, AMPHETAMINE ASPARTATE, DEXTROAMPHETAMINE SULFATE AND AMPHETAMINE SULFATE 5; 5; 5; 5 MG/1; MG/1; MG/1; MG/1
1 TABLET ORAL DAILY
Qty: 30 TABLET | Refills: 0 | Status: SHIPPED | OUTPATIENT
Start: 2019-06-17 | End: 2019-07-16 | Stop reason: SDUPTHER

## 2019-06-24 ENCOUNTER — OFFICE VISIT (OUTPATIENT)
Dept: SLEEP MEDICINE | Facility: HOSPITAL | Age: 34
End: 2019-06-24

## 2019-06-24 VITALS
DIASTOLIC BLOOD PRESSURE: 77 MMHG | SYSTOLIC BLOOD PRESSURE: 141 MMHG | HEART RATE: 79 BPM | HEIGHT: 75 IN | WEIGHT: 302 LBS | BODY MASS INDEX: 37.55 KG/M2 | OXYGEN SATURATION: 98 %

## 2019-06-24 DIAGNOSIS — G47.33 OBSTRUCTIVE SLEEP APNEA: Primary | ICD-10-CM

## 2019-06-24 PROCEDURE — 99213 OFFICE O/P EST LOW 20 MIN: CPT | Performed by: NURSE PRACTITIONER

## 2019-06-24 NOTE — PROGRESS NOTES
Subjective: Follow-up        Chief Complaint:   Chief Complaint   Patient presents with   • Follow-up       HPI:    Sergio Lucero is a 33 y.o. male here for follow-up of sleep apnea.  Patient was seen here in consult 2/9/2018 by Dr. Sergio Lorenz.  Patient has been diagnosed with sleep apnea and on CPAP since 2014.  Patient is currently sleeping 7 hours nightly and feels refreshed upon awakening.  Patient does have 4 children under the age of 4 and sometimes does get up frequently throughout the night but this is not related to CPAP.  Patient has an [place] score of 4/24.  Patient states he is doing very well and has no complaints.  Patient wishes to continue CPAP and wishes to get an order today for a new machine.        Current medications are:   Current Outpatient Medications:   •  amphetamine-dextroamphetamine (ADDERALL) 20 MG tablet, Take 1 tablet by mouth Daily., Disp: 30 tablet, Rfl: 0  •  buPROPion XL (WELLBUTRIN XL) 300 MG 24 hr tablet, Take 1 tablet by mouth Every Morning., Disp: 30 tablet, Rfl: 2  •  HYDROcodone-acetaminophen (NORCO)  MG per tablet, As Needed., Disp: , Rfl:   •  lisdexamfetamine (VYVANSE) 70 MG capsule, Take 1 capsule by mouth Daily, Disp: 30 capsule, Rfl: 0  •  zolpidem (AMBIEN) 10 MG tablet, Take 1 tablet by mouth every night at bedtime., Disp: 30 tablet, Rfl: 0.      The patient's relevant past medical, surgical, family and social history were reviewed and updated in Epic as appropriate.       Review of Systems   Respiratory: Positive for apnea.    Musculoskeletal: Positive for arthralgias and back pain.   Allergic/Immunologic: Positive for environmental allergies.   Psychiatric/Behavioral: Positive for dysphoric mood and sleep disturbance. The patient is nervous/anxious.    All other systems reviewed and are negative.        Objective:    Physical Exam   Constitutional: He is oriented to person, place, and time. He appears well-developed and well-nourished.   HENT:   Head:  Normocephalic and atraumatic.   Mouth/Throat: Oropharynx is clear and moist.   Mallampati 2 anatomy   Eyes: Conjunctivae are normal.   Neck: Neck supple. No thyromegaly present.   Cardiovascular: Normal rate and regular rhythm.   Pulmonary/Chest: Effort normal and breath sounds normal.   Lymphadenopathy:     He has no cervical adenopathy.   Neurological: He is alert and oriented to person, place, and time.   Skin: Skin is warm and dry.   Psychiatric: He has a normal mood and affect. His behavior is normal. Judgment and thought content normal.   Nursing note and vitals reviewed.  30/30 days of use.  90% pressure 11.  Greater than 4-hour use 90%.  AHI of 0.5.  Download reviewed with patient.      ASSESSMENT/PLAN    Sergio was seen today for follow-up.    Diagnoses and all orders for this visit:    Obstructive sleep apnea  -     CPAP Therapy            1. Counseled patient regarding multimodal approach with healthy nutrition, healthy sleep, regular physical activity, social activities, counseling, and medications. Encouraged to practice lateral sleep position. Avoid alcohol and sedatives close to bedtime.  2. Patient's new machine has been ordered and faxed to DME today.  Patient will return to clinic in 31 to 90 days.    I have reviewed the results of my evaluation and impression and discussed my recommendations in detail with the patient.      Signed by  DARVIN Oneil    June 24, 2019      CC: Noelle Roberts MD          No ref. provider found

## 2019-07-02 DIAGNOSIS — F98.8 ATTENTION DEFICIT DISORDER (ADD) WITHOUT HYPERACTIVITY: ICD-10-CM

## 2019-07-03 DIAGNOSIS — F98.8 ATTENTION DEFICIT DISORDER (ADD) WITHOUT HYPERACTIVITY: ICD-10-CM

## 2019-07-15 DIAGNOSIS — F98.8 ATTENTION DEFICIT DISORDER (ADD) WITHOUT HYPERACTIVITY: ICD-10-CM

## 2019-07-15 RX ORDER — DEXTROAMPHETAMINE SACCHARATE, AMPHETAMINE ASPARTATE, DEXTROAMPHETAMINE SULFATE AND AMPHETAMINE SULFATE 5; 5; 5; 5 MG/1; MG/1; MG/1; MG/1
1 TABLET ORAL DAILY
Qty: 30 TABLET | Refills: 0 | Status: CANCELLED | OUTPATIENT
Start: 2019-07-15

## 2019-07-16 DIAGNOSIS — F98.8 ATTENTION DEFICIT DISORDER (ADD) WITHOUT HYPERACTIVITY: ICD-10-CM

## 2019-07-16 RX ORDER — DEXTROAMPHETAMINE SACCHARATE, AMPHETAMINE ASPARTATE, DEXTROAMPHETAMINE SULFATE AND AMPHETAMINE SULFATE 5; 5; 5; 5 MG/1; MG/1; MG/1; MG/1
1 TABLET ORAL DAILY
Qty: 30 TABLET | Refills: 0 | Status: SHIPPED | OUTPATIENT
Start: 2019-07-16 | End: 2019-08-12 | Stop reason: SDUPTHER

## 2019-08-12 DIAGNOSIS — F98.8 ATTENTION DEFICIT DISORDER (ADD) WITHOUT HYPERACTIVITY: ICD-10-CM

## 2019-08-13 RX ORDER — DEXTROAMPHETAMINE SACCHARATE, AMPHETAMINE ASPARTATE, DEXTROAMPHETAMINE SULFATE AND AMPHETAMINE SULFATE 5; 5; 5; 5 MG/1; MG/1; MG/1; MG/1
1 TABLET ORAL DAILY
Qty: 30 TABLET | Refills: 0 | Status: SHIPPED | OUTPATIENT
Start: 2019-08-13 | End: 2019-09-10 | Stop reason: SDUPTHER

## 2019-08-23 ENCOUNTER — OFFICE VISIT (OUTPATIENT)
Dept: INTERNAL MEDICINE | Facility: CLINIC | Age: 34
End: 2019-08-23

## 2019-08-23 VITALS
SYSTOLIC BLOOD PRESSURE: 130 MMHG | TEMPERATURE: 97.3 F | WEIGHT: 308 LBS | BODY MASS INDEX: 38.3 KG/M2 | DIASTOLIC BLOOD PRESSURE: 82 MMHG | HEIGHT: 75 IN

## 2019-08-23 DIAGNOSIS — R53.83 FATIGUE, UNSPECIFIED TYPE: ICD-10-CM

## 2019-08-23 DIAGNOSIS — F98.8 ATTENTION DEFICIT DISORDER (ADD) WITHOUT HYPERACTIVITY: Primary | ICD-10-CM

## 2019-08-23 DIAGNOSIS — G47.00 INSOMNIA, UNSPECIFIED TYPE: ICD-10-CM

## 2019-08-23 DIAGNOSIS — D50.9 IRON DEFICIENCY ANEMIA, UNSPECIFIED IRON DEFICIENCY ANEMIA TYPE: ICD-10-CM

## 2019-08-23 LAB
ALBUMIN SERPL-MCNC: 4.3 G/DL (ref 3.5–5.2)
ALBUMIN/GLOB SERPL: 1.5 G/DL
ALP SERPL-CCNC: 77 U/L (ref 39–117)
ALT SERPL W P-5'-P-CCNC: 46 U/L (ref 1–41)
ANION GAP SERPL CALCULATED.3IONS-SCNC: 13.6 MMOL/L (ref 5–15)
AST SERPL-CCNC: 39 U/L (ref 1–40)
BILIRUB SERPL-MCNC: 0.2 MG/DL (ref 0.2–1.2)
BUN BLD-MCNC: 8 MG/DL (ref 6–20)
BUN/CREAT SERPL: 8.3 (ref 7–25)
CALCIUM SPEC-SCNC: 9 MG/DL (ref 8.6–10.5)
CHLORIDE SERPL-SCNC: 103 MMOL/L (ref 98–107)
CHOLEST SERPL-MCNC: 139 MG/DL (ref 0–200)
CO2 SERPL-SCNC: 25.4 MMOL/L (ref 22–29)
CREAT BLD-MCNC: 0.96 MG/DL (ref 0.76–1.27)
DEPRECATED RDW RBC AUTO: 43.8 FL (ref 37–54)
ERYTHROCYTE [DISTWIDTH] IN BLOOD BY AUTOMATED COUNT: 13.2 % (ref 12.3–15.4)
GFR SERPL CREATININE-BSD FRML MDRD: 89 ML/MIN/1.73
GLOBULIN UR ELPH-MCNC: 2.9 GM/DL
GLUCOSE BLD-MCNC: 83 MG/DL (ref 65–99)
HCT VFR BLD AUTO: 41.9 % (ref 37.5–51)
HDLC SERPL-MCNC: 37 MG/DL (ref 40–60)
HGB BLD-MCNC: 12.9 G/DL (ref 13–17.7)
IRON 24H UR-MRATE: 150 MCG/DL (ref 59–158)
IRON SATN MFR SERPL: 31 % (ref 20–50)
LDLC SERPL CALC-MCNC: 68 MG/DL (ref 0–100)
LDLC/HDLC SERPL: 1.83 {RATIO}
MCH RBC QN AUTO: 28.7 PG (ref 26.6–33)
MCHC RBC AUTO-ENTMCNC: 30.8 G/DL (ref 31.5–35.7)
MCV RBC AUTO: 93.1 FL (ref 79–97)
PLATELET # BLD AUTO: 333 10*3/MM3 (ref 140–450)
PMV BLD AUTO: 10.8 FL (ref 6–12)
POTASSIUM BLD-SCNC: 4.3 MMOL/L (ref 3.5–5.2)
PROT SERPL-MCNC: 7.2 G/DL (ref 6–8.5)
RBC # BLD AUTO: 4.5 10*6/MM3 (ref 4.14–5.8)
SODIUM BLD-SCNC: 142 MMOL/L (ref 136–145)
TIBC SERPL-MCNC: 486 MCG/DL (ref 298–536)
TRANSFERRIN SERPL-MCNC: 326 MG/DL (ref 200–360)
TRIGL SERPL-MCNC: 171 MG/DL (ref 0–150)
TSH SERPL DL<=0.05 MIU/L-ACNC: 4.76 MIU/ML (ref 0.27–4.2)
VLDLC SERPL-MCNC: 34.2 MG/DL
WBC NRBC COR # BLD: 7.4 10*3/MM3 (ref 3.4–10.8)

## 2019-08-23 PROCEDURE — 85027 COMPLETE CBC AUTOMATED: CPT | Performed by: INTERNAL MEDICINE

## 2019-08-23 PROCEDURE — 80061 LIPID PANEL: CPT | Performed by: INTERNAL MEDICINE

## 2019-08-23 PROCEDURE — 99214 OFFICE O/P EST MOD 30 MIN: CPT | Performed by: INTERNAL MEDICINE

## 2019-08-23 PROCEDURE — 83540 ASSAY OF IRON: CPT | Performed by: INTERNAL MEDICINE

## 2019-08-23 PROCEDURE — 80053 COMPREHEN METABOLIC PANEL: CPT | Performed by: INTERNAL MEDICINE

## 2019-08-23 PROCEDURE — 84466 ASSAY OF TRANSFERRIN: CPT | Performed by: INTERNAL MEDICINE

## 2019-08-23 PROCEDURE — 84443 ASSAY THYROID STIM HORMONE: CPT | Performed by: INTERNAL MEDICINE

## 2019-08-23 RX ORDER — DEXTROAMPHETAMINE SACCHARATE, AMPHETAMINE ASPARTATE MONOHYDRATE, DEXTROAMPHETAMINE SULFATE AND AMPHETAMINE SULFATE 7.5; 7.5; 7.5; 7.5 MG/1; MG/1; MG/1; MG/1
30 CAPSULE, EXTENDED RELEASE ORAL EVERY MORNING
Qty: 30 CAPSULE | Refills: 0 | Status: SHIPPED | OUTPATIENT
Start: 2019-08-23 | End: 2019-09-20 | Stop reason: SDUPTHER

## 2019-08-23 NOTE — PROGRESS NOTES
"Subjective   Sergio Lucero is a 34 y.o. male here for follow-up ADD, insomnia, new fatigue. ADD: has been on vyvanse plus adderall extender for years. 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 He has been on both adderall XR and concerta in the past and they have both been ineffective with side effects. His classes start up again next week so he needs something to get him through. He takes ambien PRN sleep and that works well without daytime somnolence. Fatigue: last few days has felt very tired like he could sleep all day. No other new sx. He has hx of Fe deficiency anemia. Wants some labs done. Doesn't feel ill, no fever or chills.     The following portions of the patient's history were reviewed and updated as appropriate: allergies, current medications, past medical history, past social history, past surgical history and problem list.    Review of Systems:  General: fatigue  HEENT: negative  CV: negative  Respiratory: negative  Neuro: negative  Psych: inattention, sleep disturbance    Objective   /82 (BP Location: Left arm, Patient Position: Sitting, Cuff Size: Large Adult)   Temp 97.3 °F (36.3 °C) (Temporal)   Ht 190.5 cm (75\")   Wt (!) 140 kg (308 lb)   BMI 38.50 kg/m²     Physical Exam   Constitutional: He is oriented to person, place, and time. He appears well-developed and well-nourished.   Cardiovascular: Normal rate, regular rhythm and normal heart sounds.   Pulmonary/Chest: Effort normal and breath sounds normal. He has no wheezes. He has no rales.   Neurological: He is alert and oriented to person, place, and time.   Skin: Skin is warm and dry.   Psychiatric: He has a normal mood and affect. His behavior is normal. Thought content normal.   Vitals reviewed.      Assessment/Plan   Sergio was seen today for depression, anxiety and add.    Diagnoses and all orders for this visit:    Attention deficit disorder (ADD) without " hyperactivity  -     amphetamine-dextroamphetamine XR (ADDERALL XR) 30 MG 24 hr capsule; Take 1 capsule by mouth Every Morning  The patient has read and signed the [facility] Controlled Substance Contract.  I will continue to see patient for regular follow up appointments.  They are well controlled on their medication.  QUEENIE is updated every 3 months. The patient is aware of the potential for addiction and dependence.  -UDS today    Insomnia, unspecified type  -continue ambien  -chronic stable    Fatigue, unspecified type  -     CBC (No Diff)  -     Comprehensive Metabolic Panel  -     TSH  -     Iron and TIBC  -     Lipid Panel  -new requiring further workup    Iron deficiency anemia, unspecified iron deficiency anemia type  -     CBC (No Diff)  -     Iron and TIBC  -new requiring workup

## 2019-08-27 ENCOUNTER — TELEPHONE (OUTPATIENT)
Dept: INTERNAL MEDICINE | Facility: CLINIC | Age: 34
End: 2019-08-27

## 2019-08-27 NOTE — TELEPHONE ENCOUNTER
Spoke with patient, he didn't want to tell me what his symptoms are. He said he would call back to schedule with one of the [facility]'s

## 2019-09-10 DIAGNOSIS — F33.1 MODERATE EPISODE OF RECURRENT MAJOR DEPRESSIVE DISORDER (HCC): ICD-10-CM

## 2019-09-10 DIAGNOSIS — F98.8 ATTENTION DEFICIT DISORDER (ADD) WITHOUT HYPERACTIVITY: ICD-10-CM

## 2019-09-10 DIAGNOSIS — F41.9 ANXIETY: ICD-10-CM

## 2019-09-10 RX ORDER — DEXTROAMPHETAMINE SACCHARATE, AMPHETAMINE ASPARTATE, DEXTROAMPHETAMINE SULFATE AND AMPHETAMINE SULFATE 5; 5; 5; 5 MG/1; MG/1; MG/1; MG/1
1 TABLET ORAL DAILY
Qty: 30 TABLET | Refills: 0 | Status: SHIPPED | OUTPATIENT
Start: 2019-09-10 | End: 2019-10-11 | Stop reason: SDUPTHER

## 2019-09-10 RX ORDER — BUPROPION HYDROCHLORIDE 300 MG/1
300 TABLET ORAL EVERY MORNING
Qty: 30 TABLET | Refills: 2 | Status: SHIPPED | OUTPATIENT
Start: 2019-09-10 | End: 2019-10-11 | Stop reason: SDUPTHER

## 2019-09-19 DIAGNOSIS — F98.8 ATTENTION DEFICIT DISORDER (ADD) WITHOUT HYPERACTIVITY: ICD-10-CM

## 2019-09-19 RX ORDER — DEXTROAMPHETAMINE SACCHARATE, AMPHETAMINE ASPARTATE MONOHYDRATE, DEXTROAMPHETAMINE SULFATE AND AMPHETAMINE SULFATE 7.5; 7.5; 7.5; 7.5 MG/1; MG/1; MG/1; MG/1
30 CAPSULE, EXTENDED RELEASE ORAL EVERY MORNING
Qty: 30 CAPSULE | Refills: 0 | Status: CANCELLED | OUTPATIENT
Start: 2019-09-19

## 2019-09-20 DIAGNOSIS — F98.8 ATTENTION DEFICIT DISORDER (ADD) WITHOUT HYPERACTIVITY: ICD-10-CM

## 2019-09-20 RX ORDER — DEXTROAMPHETAMINE SACCHARATE, AMPHETAMINE ASPARTATE MONOHYDRATE, DEXTROAMPHETAMINE SULFATE AND AMPHETAMINE SULFATE 7.5; 7.5; 7.5; 7.5 MG/1; MG/1; MG/1; MG/1
30 CAPSULE, EXTENDED RELEASE ORAL EVERY MORNING
Qty: 30 CAPSULE | Refills: 0 | Status: SHIPPED | OUTPATIENT
Start: 2019-09-20 | End: 2019-10-11 | Stop reason: SDUPTHER

## 2019-09-26 ENCOUNTER — OFFICE VISIT (OUTPATIENT)
Dept: INTERNAL MEDICINE | Facility: CLINIC | Age: 34
End: 2019-09-26

## 2019-09-26 VITALS
HEIGHT: 75 IN | SYSTOLIC BLOOD PRESSURE: 122 MMHG | TEMPERATURE: 97.6 F | DIASTOLIC BLOOD PRESSURE: 80 MMHG | BODY MASS INDEX: 37.9 KG/M2 | WEIGHT: 304.8 LBS

## 2019-09-26 DIAGNOSIS — F98.8 ATTENTION DEFICIT DISORDER (ADD) WITHOUT HYPERACTIVITY: ICD-10-CM

## 2019-09-26 DIAGNOSIS — Z23 NEED FOR INFLUENZA VACCINATION: ICD-10-CM

## 2019-09-26 DIAGNOSIS — R79.89 ELEVATED TSH: Primary | ICD-10-CM

## 2019-09-26 LAB
T3FREE SERPL-MCNC: 3.19 PG/ML (ref 2–4.4)
T4 FREE SERPL-MCNC: 1.12 NG/DL (ref 0.93–1.7)
TSH SERPL DL<=0.05 MIU/L-ACNC: 2.86 UIU/ML (ref 0.27–4.2)

## 2019-09-26 PROCEDURE — 86800 THYROGLOBULIN ANTIBODY: CPT | Performed by: INTERNAL MEDICINE

## 2019-09-26 PROCEDURE — 84439 ASSAY OF FREE THYROXINE: CPT | Performed by: INTERNAL MEDICINE

## 2019-09-26 PROCEDURE — 84443 ASSAY THYROID STIM HORMONE: CPT | Performed by: INTERNAL MEDICINE

## 2019-09-26 PROCEDURE — 90471 IMMUNIZATION ADMIN: CPT | Performed by: INTERNAL MEDICINE

## 2019-09-26 PROCEDURE — 86376 MICROSOMAL ANTIBODY EACH: CPT | Performed by: INTERNAL MEDICINE

## 2019-09-26 PROCEDURE — 90674 CCIIV4 VAC NO PRSV 0.5 ML IM: CPT | Performed by: INTERNAL MEDICINE

## 2019-09-26 PROCEDURE — 84481 FREE ASSAY (FT-3): CPT | Performed by: INTERNAL MEDICINE

## 2019-09-26 PROCEDURE — 99214 OFFICE O/P EST MOD 30 MIN: CPT | Performed by: INTERNAL MEDICINE

## 2019-09-26 NOTE — PROGRESS NOTES
"Subjective   Sergio Lucero is a 34 y.o. male here for follow-up elevated TSH. He has had inability to lose weight, depression. No hair loss, dry skin, edema, constipation. He thinks his mother has hypothyroidism. His adderall is doing well after the change from Vyvanse. No complaints with that.      I have reviewed the following portions of the patient's history and confirmed they are accurate: current medications, past family history, past medical history and problem list     I have personally completed the patient's review of systems.    Review of Systems:  General: can't lose weight  CV: negative  Skin: eczema  Neuro: negative  Psych: dysphoric mood  GI: negative    Objective   /80 (BP Location: Left arm, Patient Position: Sitting, Cuff Size: Large Adult)   Temp 97.6 °F (36.4 °C) (Temporal)   Ht 190.5 cm (75\")   Wt (!) 138 kg (304 lb 12.8 oz)   BMI 38.10 kg/m²     Physical Exam   Constitutional: He is oriented to person, place, and time. He appears well-developed and well-nourished.   HENT:   Head: Normocephalic and atraumatic.   Pulmonary/Chest: Effort normal.   Neurological: He is alert and oriented to person, place, and time.   Skin: Skin is warm and dry.   Psychiatric: He has a normal mood and affect. His behavior is normal. Judgment and thought content normal.   Vitals reviewed.      Assessment/Plan   Sergio was seen today for follow-up.    Diagnoses and all orders for this visit:    Elevated TSH  -     TSH  -     T4, Free  -     T3, Free  -     Thyroid Antibodies  -new requiring workup. Discussed if thyroid Ab abnormal more likely to treat as Hashimoto's or Grave's    Need for influenza vaccination  -     Flucelvax Quad=>4Years ([id number])    Attention deficit disorder (ADD) without hyperactivity  -doing well on adderall, continue  -chronic controlled           Tea Chiu MA    "

## 2019-09-27 ENCOUNTER — OFFICE VISIT (OUTPATIENT)
Dept: SLEEP MEDICINE | Facility: HOSPITAL | Age: 34
End: 2019-09-27

## 2019-09-27 VITALS
HEART RATE: 86 BPM | WEIGHT: 306.4 LBS | DIASTOLIC BLOOD PRESSURE: 79 MMHG | BODY MASS INDEX: 38.1 KG/M2 | HEIGHT: 75 IN | OXYGEN SATURATION: 97 % | SYSTOLIC BLOOD PRESSURE: 119 MMHG

## 2019-09-27 DIAGNOSIS — G47.33 OBSTRUCTIVE SLEEP APNEA: Primary | ICD-10-CM

## 2019-09-27 PROCEDURE — 99212 OFFICE O/P EST SF 10 MIN: CPT | Performed by: NURSE PRACTITIONER

## 2019-09-27 NOTE — PROGRESS NOTES
Chief Complaint:   Chief Complaint   Patient presents with   • Follow-up       HPI:    Sergio Lucero is a 34 y.o. male here for follow-up of sleep apnea.  Patient was last seen 6/24/2019.  Patient has had sleep apnea since 2014.  Patient states he is doing well with CPAP therapy.  Patient is sleeping 7 to 8 hours nightly and does feel refreshed upon awakening though sometimes feels sleepy during the day.  Patient has an [place] score of 5/24.  Patient states he has 4 young children that do wake him during the night and is also ready to graduate from law school.  He attributes his sleepiness to these factors.  Patient does wish to continue with CPAP therapy.        Current medications are:   Current Outpatient Medications:   •  amphetamine-dextroamphetamine (ADDERALL) 20 MG tablet, Take 1 tablet by mouth Daily., Disp: 30 tablet, Rfl: 0  •  amphetamine-dextroamphetamine XR (ADDERALL XR) 30 MG 24 hr capsule, Take 1 capsule by mouth Every Morning, Disp: 30 capsule, Rfl: 0  •  buPROPion XL (WELLBUTRIN XL) 300 MG 24 hr tablet, Take 1 tablet by mouth Every Morning., Disp: 30 tablet, Rfl: 2  •  HYDROcodone-acetaminophen (NORCO)  MG per tablet, As Needed., Disp: , Rfl:   •  zolpidem (AMBIEN) 10 MG tablet, Take 1 tablet by mouth every night at bedtime., Disp: 30 tablet, Rfl: 0.      The patient's relevant past medical, surgical, family and social history were reviewed and updated in Epic as appropriate.       Review of Systems   Respiratory: Positive for apnea.    Musculoskeletal: Positive for arthralgias and back pain.   Allergic/Immunologic: Positive for environmental allergies.   Psychiatric/Behavioral: Positive for dysphoric mood and sleep disturbance. The patient is nervous/anxious.    All other systems reviewed and are negative.        Objective:    Physical Exam   Constitutional: He is oriented to person, place, and time. He appears well-developed and well-nourished.   HENT:   Head: Normocephalic and  atraumatic.   Mouth/Throat: Oropharynx is clear and moist.   Mallampati 2 anatomy   Eyes: Conjunctivae are normal.   Neck: Neck supple. No thyromegaly present.   Cardiovascular: Normal rate and regular rhythm.   Pulmonary/Chest: Effort normal and breath sounds normal.   Lymphadenopathy:     He has no cervical adenopathy.   Neurological: He is alert and oriented to person, place, and time.   Skin: Skin is warm and dry.   Psychiatric: He has a normal mood and affect. His behavior is normal. Judgment and thought content normal.   Nursing note and vitals reviewed.  34/30 4 days of use.  Greater than 4-hour use 100%.  90% pressure 10.8.  AHI of 1.0.  Download reviewed with patient.      ASSESSMENT/PLAN    Sergio was seen today for follow-up.    Diagnoses and all orders for this visit:    Obstructive sleep apnea  -     CPAP Therapy            1. Counseled patient regarding multimodal approach with healthy nutrition, healthy sleep, regular physical activity, social activities, counseling, and medications. Encouraged to practice lateral sleep position. Avoid alcohol and sedatives close to bedtime.  2. Refill supplies x1 year.  Return to clinic 1 year or sooner if symptoms warrant.    I have reviewed the results of my evaluation and impression and discussed my recommendations in detail with the patient.      Signed by  DARVIN Oneil    September 27, 2019      CC: Noelle Roberts MD          No ref. provider found

## 2019-09-30 LAB
THYROGLOB AB SERPL-ACNC: 296.2 IU/ML (ref 0–0.9)
THYROPEROXIDASE AB SERPL-ACNC: >600 IU/ML (ref 0–34)

## 2019-10-08 ENCOUNTER — OFFICE VISIT (OUTPATIENT)
Dept: INTERNAL MEDICINE | Facility: CLINIC | Age: 34
End: 2019-10-08

## 2019-10-08 ENCOUNTER — TELEPHONE (OUTPATIENT)
Dept: INTERNAL MEDICINE | Facility: CLINIC | Age: 34
End: 2019-10-08

## 2019-10-08 VITALS
SYSTOLIC BLOOD PRESSURE: 117 MMHG | BODY MASS INDEX: 37.8 KG/M2 | TEMPERATURE: 97.8 F | DIASTOLIC BLOOD PRESSURE: 74 MMHG | HEIGHT: 75 IN | WEIGHT: 304 LBS

## 2019-10-08 DIAGNOSIS — R76.8 THYROID ANTIBODY POSITIVE: Primary | ICD-10-CM

## 2019-10-08 PROCEDURE — 99213 OFFICE O/P EST LOW 20 MIN: CPT | Performed by: INTERNAL MEDICINE

## 2019-10-08 NOTE — PROGRESS NOTES
"Subjective   Sergio Lucero is a 34 y.o. male here for follow-up recent blood work for thyroid. He had elevated TSH on screening labs, next TSH, T4, and T3 all normal but thyroid Ab very positive. He is here to discuss. He does notice mood swings, always feeling warm, and fatigue. His mother has hypothyroidism but unsure the cause.      I have reviewed the following portions of the patient's history and confirmed they are accurate: current medications, past family history, past medical history and problem list     I have personally completed the patient's review of systems.    Review of Systems:  General: fatigue  HEENT: negative  CV: negative  GI: negative  Skin: negative  Endo: always warm    Objective   /74 (BP Location: Left arm, Patient Position: Sitting, Cuff Size: Large Adult)   Temp 97.8 °F (36.6 °C) (Temporal)   Ht 190.5 cm (75\")   Wt (!) 138 kg (304 lb)   BMI 38.00 kg/m²     Physical Exam   Constitutional: He is oriented to person, place, and time. He appears well-developed and well-nourished.   Pulmonary/Chest: Effort normal.   Neurological: He is alert and oriented to person, place, and time.   Skin: Skin is warm and dry.   Psychiatric: He has a normal mood and affect. His behavior is normal. Judgment and thought content normal.   Vitals reviewed.      Assessment/Plan   Sergio was seen today for follow-up.    Diagnoses and all orders for this visit:    Thyroid antibody positive  -     Ambulatory Referral to Endocrinology: may cancel this if labs continue to be normal. Discussed with pt that though they are positive, it may not necessarily mean that he has or will develop Hashimoto's. Discussed it may present with hyper or hypothyroidism type sx and discussed sx of both. He has some vague sx but normal TSH, no treatment indicated currently and discussed that with pt. Recheck labs again in a month or so.  -     TSH; Future  -     T4, Free; Future  -     T3, Free; Future             Tea N " WALTER Chiu

## 2019-10-08 NOTE — TELEPHONE ENCOUNTER
PATIENT IS RETURNING OUT CALL TO SCHEDULE NEW PATIENT APPOINTMENT WITH ENDOCRINOLOGY. PATIENTS NUMBER [phone]

## 2019-10-11 DIAGNOSIS — F33.1 MODERATE EPISODE OF RECURRENT MAJOR DEPRESSIVE DISORDER (HCC): ICD-10-CM

## 2019-10-11 DIAGNOSIS — F98.8 ATTENTION DEFICIT DISORDER (ADD) WITHOUT HYPERACTIVITY: ICD-10-CM

## 2019-10-11 DIAGNOSIS — G47.00 INSOMNIA, UNSPECIFIED TYPE: ICD-10-CM

## 2019-10-11 DIAGNOSIS — F41.9 ANXIETY: ICD-10-CM

## 2019-10-11 RX ORDER — ZOLPIDEM TARTRATE 10 MG/1
10 TABLET ORAL
Qty: 30 TABLET | Refills: 0 | Status: SHIPPED | OUTPATIENT
Start: 2019-10-11 | End: 2019-11-06 | Stop reason: SDUPTHER

## 2019-10-11 RX ORDER — BUPROPION HYDROCHLORIDE 300 MG/1
300 TABLET ORAL EVERY MORNING
Qty: 30 TABLET | Refills: 2 | Status: SHIPPED | OUTPATIENT
Start: 2019-10-11 | End: 2020-02-28 | Stop reason: SDUPTHER

## 2019-10-14 RX ORDER — DEXTROAMPHETAMINE SACCHARATE, AMPHETAMINE ASPARTATE, DEXTROAMPHETAMINE SULFATE AND AMPHETAMINE SULFATE 5; 5; 5; 5 MG/1; MG/1; MG/1; MG/1
1 TABLET ORAL DAILY
Qty: 30 TABLET | Refills: 0 | Status: SHIPPED | OUTPATIENT
Start: 2019-10-14 | End: 2019-11-06 | Stop reason: SDUPTHER

## 2019-10-14 RX ORDER — DEXTROAMPHETAMINE SACCHARATE, AMPHETAMINE ASPARTATE MONOHYDRATE, DEXTROAMPHETAMINE SULFATE AND AMPHETAMINE SULFATE 7.5; 7.5; 7.5; 7.5 MG/1; MG/1; MG/1; MG/1
30 CAPSULE, EXTENDED RELEASE ORAL EVERY MORNING
Qty: 30 CAPSULE | Refills: 0 | Status: SHIPPED | OUTPATIENT
Start: 2019-10-14 | End: 2019-11-06 | Stop reason: SDUPTHER

## 2019-10-22 ENCOUNTER — TELEPHONE (OUTPATIENT)
Dept: INTERNAL MEDICINE | Facility: CLINIC | Age: 34
End: 2019-10-22

## 2019-10-22 NOTE — TELEPHONE ENCOUNTER
Spoke with Layne. Pt is on the cancellation list and we will contact him as soon as an appt is available.

## 2019-10-22 NOTE — TELEPHONE ENCOUNTER
Pt's spouse calling and asked if there is a sooner appointment than December.  Sergio's symptoms are worsening and she is very concerned.  Please contact mallory at [phone]

## 2019-10-25 ENCOUNTER — OFFICE VISIT (OUTPATIENT)
Dept: ENDOCRINOLOGY | Facility: CLINIC | Age: 34
End: 2019-10-25

## 2019-10-25 VITALS
OXYGEN SATURATION: 99 % | HEART RATE: 86 BPM | DIASTOLIC BLOOD PRESSURE: 80 MMHG | SYSTOLIC BLOOD PRESSURE: 144 MMHG | HEIGHT: 75 IN | WEIGHT: 312 LBS | BODY MASS INDEX: 38.79 KG/M2

## 2019-10-25 DIAGNOSIS — R94.6 ABNORMAL THYROID FUNCTION TEST: ICD-10-CM

## 2019-10-25 DIAGNOSIS — R76.8 THYROID ANTIBODY POSITIVE: Primary | ICD-10-CM

## 2019-10-25 PROCEDURE — 84481 FREE ASSAY (FT-3): CPT | Performed by: INTERNAL MEDICINE

## 2019-10-25 PROCEDURE — 84439 ASSAY OF FREE THYROXINE: CPT | Performed by: INTERNAL MEDICINE

## 2019-10-25 PROCEDURE — 84445 ASSAY OF TSI GLOBULIN: CPT | Performed by: INTERNAL MEDICINE

## 2019-10-25 PROCEDURE — 84443 ASSAY THYROID STIM HORMONE: CPT | Performed by: INTERNAL MEDICINE

## 2019-10-25 PROCEDURE — 83520 IMMUNOASSAY QUANT NOS NONAB: CPT | Performed by: INTERNAL MEDICINE

## 2019-10-25 PROCEDURE — 99213 OFFICE O/P EST LOW 20 MIN: CPT | Performed by: INTERNAL MEDICINE

## 2019-10-25 NOTE — PROGRESS NOTES
Chief Complaint   Patient presents with   • Establish Care   • Thyroid Antibody Positive     referred by Dr. DORIS Roberts        New patient who is being seen in consultation regarding antibodies  at the request of Noelle Roberts*     HPI   Sergio Lucero is a 34 y.o. male who presents to discuss positive thyroid antibodies.    Patient presents today for evaluation of positive thyroid antibodies which was diagnosed earlier this year when patient presented for routine labs with symptoms of fatigue. Patient denies known history of thyroid dysfunction prior to this. Patient is not currently on medication for this.  Patient is not sure his thyroid function has never been tested in the past.  He reports that most of his symptoms started around late July.     Patient denies palpitations or anxiety.  Patient reports changes in bowel habits. Patient reports diarrhea since late July.   Patient reports heat intolerance which is new.  Patient reports changes in weight. Patient reports difficulty losing weight and some weight gain.  Patient reports that he has gained approximately 40 pounds in the last 6 months  Patient denies hair, skin or nail changes.  Patient denies tremor.  Patient reports decreased energy level.    Patient denies history of previous head/neck radiation.  Patient denies history of recent iodine exposure.  Patient denies taking OTC supplements such as biotin.  Patient denies personal or family history of thyroid cancer.     Patient reports that his mom has thyroid dysfunction and takes medication but he is not sure if she is hyperthyroid or hypothyroid.  He denies knowledge of any other thyroid dysfunction in his family.    Patient denies known history of thyroid nodule. He has not noticed any changes in his neck, voice changes, difficulty breathing or difficulty swallowing.    Past Medical History:   Diagnosis Date   • Acid reflux    • ADHD (attention deficit hyperactivity disorder)    • Anemia     • Anxiety    • Depression    • History of ankle fracture     RIGHT   • History of arm fracture     BOTH at separate times   • Lumbar disc herniation     L4-L5   • Obstructive sleep apnea     on CPAP     Past Surgical History:   Procedure Laterality Date   • ADENOIDECTOMY  2009   • MENISCECTOMY Right    • TONSILLECTOMY  2009   • WISDOM TOOTH EXTRACTION  1998      Family History   Problem Relation Age of Onset   • Heart attack Father 56   • Obesity Father    • Cancer Mother    • Obesity Mother    • No Known Problems Daughter    • No Known Problems Son    • No Known Problems Maternal Grandmother    • No Known Problems Maternal Grandfather    • No Known Problems Paternal Grandmother    • No Known Problems Paternal Grandfather       Social History     Socioeconomic History   • Marital status:      Spouse name: Not on file   • Number of children: 4   • Years of education: Not on file   • Highest education level: Not on file   Tobacco Use   • Smoking status: Never Smoker   • Smokeless tobacco: Never Used   Substance and Sexual Activity   • Alcohol use: No   • Drug use: No   • Sexual activity: Yes     Partners: Female      Allergies   Allergen Reactions   • Fish-Derived Products Anaphylaxis   • Nuts Anaphylaxis   • Shellfish Allergy Anaphylaxis      Current Outpatient Medications on File Prior to Visit   Medication Sig Dispense Refill   • amphetamine-dextroamphetamine (ADDERALL) 20 MG tablet Take 1 tablet by mouth Daily. 30 tablet 0   • amphetamine-dextroamphetamine XR (ADDERALL XR) 30 MG 24 hr capsule Take 1 capsule by mouth Every Morning 30 capsule 0   • buPROPion XL (WELLBUTRIN XL) 300 MG 24 hr tablet Take 1 tablet by mouth Every Morning. 30 tablet 2   • HYDROcodone-acetaminophen (NORCO)  MG per tablet Take 2 tablets by mouth As Needed.     • zolpidem (AMBIEN) 10 MG tablet Take 1 tablet by mouth every night at bedtime. 30 tablet 0     No current facility-administered medications on file prior to visit.      "    Review of Systems   Constitutional: Positive for fatigue and unexpected weight gain.   HENT: Negative for trouble swallowing and voice change.    Eyes: Negative for itching and visual disturbance.   Respiratory: Negative for cough and shortness of breath.    Cardiovascular: Negative for chest pain and palpitations.   Gastrointestinal: Positive for abdominal pain and diarrhea. Negative for constipation.   Endocrine: Positive for heat intolerance. Negative for cold intolerance.   Musculoskeletal: Negative for arthralgias and myalgias.   Skin: Negative for dry skin and rash.   Neurological: Positive for memory problem. Negative for tremors.   Psychiatric/Behavioral: Positive for sleep disturbance. The patient is not nervous/anxious.         Vitals:    10/25/19 1256   BP: 144/80   Pulse: 86   SpO2: 99%   Weight: (!) 142 kg (312 lb)   Height: 190.5 cm (75\")   Body mass index is 39 kg/m².     Physical Exam   Constitutional: Vital signs are normal. He appears well-developed and well-nourished. He is cooperative. He is obese.  HENT:   Head: Normocephalic and atraumatic.   Mouth/Throat: Oropharynx is clear and moist and mucous membranes are normal.   Eyes: Conjunctivae and EOM are normal. Pupils are equal, round, and reactive to light.   Neck: Trachea normal. No thyromegaly present.   Cardiovascular: Normal rate and regular rhythm.   No murmur heard.  Pulmonary/Chest: Effort normal and breath sounds normal. No respiratory distress.   Abdominal: Soft. Bowel sounds are normal. He exhibits no distension. There is no hepatosplenomegaly. There is no tenderness.   Lymphadenopathy:        Head (right side): No submandibular adenopathy present.        Head (left side): No submandibular adenopathy present.     He has no cervical adenopathy.   Neurological: He is alert. He has normal reflexes.   Skin: Skin is warm, dry and intact. No rash noted.   Psychiatric: He has a normal mood and affect. His behavior is normal.   Vitals " reviewed.      Labs/Imaging  CMP  Lab Results   Component Value Date    GLUCOSE 83 08/23/2019    BUN 8 08/23/2019    CREATININE 0.96 08/23/2019    EGFRIFNONA 89 08/23/2019    BCR 8.3 08/23/2019    K 4.3 08/23/2019    CO2 25.4 08/23/2019    CALCIUM 9.0 08/23/2019    ALBUMIN 4.30 08/23/2019    AST 39 08/23/2019    ALT 46 (H) 08/23/2019        CBC w/DIFF   Lab Results   Component Value Date    WBC 7.40 08/23/2019    RBC 4.50 08/23/2019    HGB 12.9 (L) 08/23/2019    HCT 41.9 08/23/2019    MCV 93.1 08/23/2019    MCH 28.7 08/23/2019    MCHC 30.8 (L) 08/23/2019    RDW 13.2 08/23/2019    RDWSD 43.8 08/23/2019    MPV 10.8 08/23/2019     08/23/2019       TSH  Lab Results   Component Value Date    TSH 2.860 09/26/2019    TSH 4.760 (H) 08/23/2019       T4  Lab Results   Component Value Date    FREET4 1.12 09/26/2019     No results found for: F7HHIFC    T3  Lab Results   Component Value Date    T3FREE 3.19 09/26/2019     TPO  Lab Results   Component Value Date    THYROIDAB >600 (H) 09/26/2019         Assessment and Plan    Sergio was seen today for [facility] care and thyroid antibody positive.    Diagnoses and all orders for this visit:    Thyroid antibody positive  - Abnormal thyroid function test  -Patient with minimally elevated TSH in August 2019 with repeat 1 month later within reference range.  Further evaluation in September 2019 was significant for normal free T3, normal free T4, elevated thyroid peroxidase antibodies and elevated thyroglobulin antibodies.  -Discussed significance of antibody positivity.  Discussed that while positive TPO antibodies to place the patient at a higher risk than the general population for thyroid dysfunction they are not a guarantee of thyroid dysfunction.  -Discussed with the patient that in the setting of normal TSH and normal free thyroid hormones, no treatment is indicated. Discussed that patient should have routine evaluation of TSH, annually if asymptomatic.  - Symptoms of both  hyperthyroidism and hypothyroidism were discussed with the patient in detail. Patient should contact the clinic for repeat evaluation should these develop.  - Patient currently have symptoms which could be indicative of hyperthyroidism (diarrhea, heat intolerance) or hypothyroidism (weight gain, fatigue). However, these symptoms are non-specific and cannot be attributed to thyroid dysfunction in the setting of normal TSH.  - Repeat thyroid function today along with thyroid antibodies per patient preference.  -     T4, Free  -     T3, Free  -     TSH  -     Thyrotropin Receptor Antibody  -     Thyroid Stimulating Immunoglobulin       Return in about 2 months (around 12/25/2019) for Next scheduled follow up. The patient was instructed to contact the clinic with any interval questions or concerns.    Kacey Cole MD     Please note that portions of this document were completed using a voice recognition program. Efforts were made to edit the dictations, but occasionally words are mis-transcribed.

## 2019-10-26 LAB
T3FREE SERPL-MCNC: 3.05 PG/ML (ref 2–4.4)
T4 FREE SERPL-MCNC: 1.09 NG/DL (ref 0.93–1.7)
TSH SERPL DL<=0.05 MIU/L-ACNC: 2.33 UIU/ML (ref 0.27–4.2)

## 2019-10-28 ENCOUNTER — TELEPHONE (OUTPATIENT)
Dept: ENDOCRINOLOGY | Facility: CLINIC | Age: 34
End: 2019-10-28

## 2019-10-28 LAB — TSH RECEP AB SER-ACNC: <1.1 IU/L (ref 0–1.75)

## 2019-10-29 ENCOUNTER — TELEPHONE (OUTPATIENT)
Dept: INTERNAL MEDICINE | Facility: CLINIC | Age: 34
End: 2019-10-29

## 2019-10-29 LAB — TSI SER-MCNC: <0.1 IU/L (ref 0–0.55)

## 2019-10-29 NOTE — TELEPHONE ENCOUNTER
Informed patient of lab results.  Patient voiced understanding.     Patient ask about the thyroid antibodies, when we would have these results?    Informed patient, I was not sure possibly this week but was not sure.   I would give him a call once the results were reviewed by Dr. Cole.      ----- Message from Kacey Cole MD sent at 10/28/2019 12:01 PM EDT -----  See mychart comment- please contact patient and reschedule follow up

## 2019-10-31 ENCOUNTER — TELEPHONE (OUTPATIENT)
Dept: ENDOCRINOLOGY | Facility: CLINIC | Age: 34
End: 2019-10-31

## 2019-10-31 NOTE — TELEPHONE ENCOUNTER
Left a message with lab results.  Any questions to please give me a call.        ----- Message from Kacey Cole MD sent at 10/31/2019  8:42 AM EDT -----  Payal,     Please contact Mr. Lucero and let him know that his thyroid antibodies (TSI and TrAB) were negative, which is normal.     Sincerely,     Dr. Cole

## 2019-11-06 DIAGNOSIS — F98.8 ATTENTION DEFICIT DISORDER (ADD) WITHOUT HYPERACTIVITY: ICD-10-CM

## 2019-11-06 DIAGNOSIS — G47.00 INSOMNIA, UNSPECIFIED TYPE: ICD-10-CM

## 2019-11-06 RX ORDER — ZOLPIDEM TARTRATE 10 MG/1
10 TABLET ORAL
Qty: 30 TABLET | Refills: 0 | Status: SHIPPED | OUTPATIENT
Start: 2019-11-06 | End: 2019-12-09 | Stop reason: SDUPTHER

## 2019-11-06 RX ORDER — DEXTROAMPHETAMINE SACCHARATE, AMPHETAMINE ASPARTATE MONOHYDRATE, DEXTROAMPHETAMINE SULFATE AND AMPHETAMINE SULFATE 7.5; 7.5; 7.5; 7.5 MG/1; MG/1; MG/1; MG/1
30 CAPSULE, EXTENDED RELEASE ORAL EVERY MORNING
Qty: 30 CAPSULE | Refills: 0 | Status: SHIPPED | OUTPATIENT
Start: 2019-11-06 | End: 2019-12-10 | Stop reason: SDUPTHER

## 2019-11-06 RX ORDER — DEXTROAMPHETAMINE SACCHARATE, AMPHETAMINE ASPARTATE, DEXTROAMPHETAMINE SULFATE AND AMPHETAMINE SULFATE 5; 5; 5; 5 MG/1; MG/1; MG/1; MG/1
1 TABLET ORAL DAILY
Qty: 30 TABLET | Refills: 0 | Status: SHIPPED | OUTPATIENT
Start: 2019-11-06 | End: 2019-12-10 | Stop reason: SDUPTHER

## 2019-11-07 ENCOUNTER — OFFICE VISIT (OUTPATIENT)
Dept: GASTROENTEROLOGY | Facility: CLINIC | Age: 34
End: 2019-11-07

## 2019-11-07 VITALS
TEMPERATURE: 97.8 F | HEART RATE: 96 BPM | WEIGHT: 310 LBS | BODY MASS INDEX: 38.54 KG/M2 | DIASTOLIC BLOOD PRESSURE: 76 MMHG | RESPIRATION RATE: 18 BRPM | OXYGEN SATURATION: 98 % | SYSTOLIC BLOOD PRESSURE: 122 MMHG | HEIGHT: 75 IN

## 2019-11-07 DIAGNOSIS — R10.9 CHRONIC ABDOMINAL PAIN: Primary | ICD-10-CM

## 2019-11-07 DIAGNOSIS — G89.29 CHRONIC ABDOMINAL PAIN: Primary | ICD-10-CM

## 2019-11-07 PROCEDURE — 99204 OFFICE O/P NEW MOD 45 MIN: CPT | Performed by: INTERNAL MEDICINE

## 2019-11-07 RX ORDER — PANTOPRAZOLE SODIUM 40 MG/1
TABLET, DELAYED RELEASE ORAL
Qty: 90 TABLET | Refills: 3 | Status: SHIPPED | OUTPATIENT
Start: 2019-11-07 | End: 2020-04-24

## 2019-11-07 NOTE — PROGRESS NOTES
PCP: Noelle Roberts MD    Chief Complaint   Patient presents with   • Abdominal Pain        History of Present Illness:   Sregio Lucero is a 34 y.o. male who presents to the GI clinic for abdominal pain and fatigue. Reviewed labs. Hg 12.9. Admits to intermittent epigastric and ruq pain. 30 minutes after eating. Pain may be improved with antacids. The pain has lasted for 3 months. He takes lortab for chronic lumbargo. Denies nsaid use. He does report a history of black tarry stool.  Bowels are regular.  He complains of fatigue. Follows with endocrinology for + thyroid antibodies with normal levels. He sleeps 6-8 hours on average with cpap.  Alt > 40.     Past Medical History:   Diagnosis Date   • Acid reflux    • ADHD (attention deficit hyperactivity disorder)    • Anemia    • Anxiety    • Depression    • History of ankle fracture     RIGHT   • History of arm fracture     BOTH at separate times   • Lumbar disc herniation     L4-L5   • Obstructive sleep apnea     on CPAP       Past Surgical History:   Procedure Laterality Date   • ADENOIDECTOMY  2009   • MENISCECTOMY Right    • TONSILLECTOMY  2009   • WISDOM TOOTH EXTRACTION  1998         Current Outpatient Medications:   •  amphetamine-dextroamphetamine (ADDERALL) 20 MG tablet, Take 1 tablet by mouth Daily., Disp: 30 tablet, Rfl: 0  •  amphetamine-dextroamphetamine XR (ADDERALL XR) 30 MG 24 hr capsule, Take 1 capsule by mouth Every Morning, Disp: 30 capsule, Rfl: 0  •  buPROPion XL (WELLBUTRIN XL) 300 MG 24 hr tablet, Take 1 tablet by mouth Every Morning., Disp: 30 tablet, Rfl: 2  •  HYDROcodone-acetaminophen (NORCO)  MG per tablet, Take 2 tablets by mouth As Needed., Disp: , Rfl:   •  zolpidem (AMBIEN) 10 MG tablet, Take 1 tablet by mouth every night at bedtime., Disp: 30 tablet, Rfl: 0    Allergies   Allergen Reactions   • Fish-Derived Products Anaphylaxis   • Nuts Anaphylaxis   • Shellfish Allergy Anaphylaxis       Family History   Problem  Relation Age of Onset   • Heart attack Father 56   • Obesity Father    • Cancer Mother    • Obesity Mother    • No Known Problems Daughter    • No Known Problems Son    • No Known Problems Maternal Grandmother    • No Known Problems Maternal Grandfather    • No Known Problems Paternal Grandmother    • No Known Problems Paternal Grandfather        Social History     Socioeconomic History   • Marital status:      Spouse name: Not on file   • Number of children: 4   • Years of education: Not on file   • Highest education level: Not on file   Tobacco Use   • Smoking status: Never Smoker   • Smokeless tobacco: Never Used   Substance and Sexual Activity   • Alcohol use: No   • Drug use: No   • Sexual activity: Yes     Partners: Female       Review of Systems   Constitutional: Positive for fatigue.   Gastrointestinal: Positive for abdominal pain, diarrhea and nausea. Negative for constipation and vomiting.   Genitourinary: Negative for dysuria, frequency and hematuria.   Musculoskeletal: Negative for arthralgias and myalgias.   Neurological: Negative for headaches.     All other systems reviewed and are negative.    There were no vitals filed for this visit.    Physical Exam  General Appearance:  Vitals as above. no acute distress  High bmi  Head/face:  Normocephalic, atraumatic  Eyes:   EOMI, no conjunctivitis or icterus   Nose/Sinuses:  Nares patent bilaterally without discharge or lesions  Mouth/Throat:  Posterior pharynx is pink without drainage or exudates;  dentition is in good condition and repair  Neck:  trachea is midline, no thyromegaly  Lungs:  Clear to auscultation bilaterally  Heart:  Regular rate and rhythm without murmur, gallop or rub  Abdomen:  Soft, pain on palpation of ruq, no obvious masses, bowel sounds positive in four quadrants; no abdominal bruits; no guarding or rebound tenderness  Neurologic:  Alert; no focal deficits; age appropriate behavior and speech  Psychiatric: mood and affect are  congruent  Vascular: extremities without edema  Skin: no rash or cyanosis.      Assessment/Plan  1.) Chronic intermittent ruq and epigastric abdominal pain  2.) Mild anemia  3.) History of black tarry stool    Due to anemia and chronicity, recommend EGD due to possible ulcer.  Will start PPI.  He may also have liver pain, gallbladder pain and will assess u/s of abdomen.      4.) abnormal lfts  5.) Obesity  Recommend 10% wt loss a year. Suspect fatty liver disease which can cause ruq pain and fatigue.      6.) Fatigue  Explained that fatty liver disease, stress, low quality sleep, and anemia may all cause.  Will plan workup as above    rtc in 2-3 months.    Aries Sandhu MD  11/7/2019  Answers for HPI/ROS submitted by the patient on 11/6/2019   Abdominal pain  Chronicity: recurrent  Onset: more than 1 month ago  Onset quality: gradual  Frequency: every several days  Episode duration: 2 hours  anorexia: No  belching: No  flatus: No  hematochezia: No  melena: Yes  weight loss: No  Aggravated by: eating  Relieved by: nothing

## 2019-11-08 ENCOUNTER — OUTSIDE FACILITY SERVICE (OUTPATIENT)
Dept: GASTROENTEROLOGY | Facility: CLINIC | Age: 34
End: 2019-11-08

## 2019-11-08 ENCOUNTER — LAB REQUISITION (OUTPATIENT)
Dept: LAB | Facility: HOSPITAL | Age: 34
End: 2019-11-08

## 2019-11-08 DIAGNOSIS — R10.84 GENERALIZED ABDOMINAL PAIN: ICD-10-CM

## 2019-11-08 DIAGNOSIS — K92.2 GASTROINTESTINAL HEMORRHAGE, UNSPECIFIED: ICD-10-CM

## 2019-11-08 DIAGNOSIS — D62 ACUTE POSTHEMORRHAGIC ANEMIA: ICD-10-CM

## 2019-11-08 DIAGNOSIS — K44.9 HIATAL HERNIA: Primary | ICD-10-CM

## 2019-11-08 PROCEDURE — 43239 EGD BIOPSY SINGLE/MULTIPLE: CPT | Performed by: INTERNAL MEDICINE

## 2019-11-08 PROCEDURE — 88305 TISSUE EXAM BY PATHOLOGIST: CPT | Performed by: INTERNAL MEDICINE

## 2019-11-11 LAB
CYTO UR: NORMAL
LAB AP CASE REPORT: NORMAL
LAB AP CLINICAL INFORMATION: NORMAL
PATH REPORT.FINAL DX SPEC: NORMAL
PATH REPORT.GROSS SPEC: NORMAL

## 2019-11-14 ENCOUNTER — HOSPITAL ENCOUNTER (OUTPATIENT)
Dept: ULTRASOUND IMAGING | Facility: HOSPITAL | Age: 34
Discharge: HOME | End: 2019-11-14
Admitting: INTERNAL MEDICINE

## 2019-11-14 DIAGNOSIS — R10.9 CHRONIC ABDOMINAL PAIN: ICD-10-CM

## 2019-11-14 DIAGNOSIS — G89.29 CHRONIC ABDOMINAL PAIN: ICD-10-CM

## 2019-11-14 PROCEDURE — 76700 US EXAM ABDOM COMPLETE: CPT

## 2019-11-19 ENCOUNTER — HOSPITAL ENCOUNTER (OUTPATIENT)
Dept: GENERAL RADIOLOGY | Facility: HOSPITAL | Age: 34
Discharge: HOME | End: 2019-11-19
Admitting: INTERNAL MEDICINE

## 2019-11-19 DIAGNOSIS — K44.9 HIATAL HERNIA: ICD-10-CM

## 2019-11-19 PROCEDURE — A9270 NON-COVERED ITEM OR SERVICE: HCPCS | Performed by: INTERNAL MEDICINE

## 2019-11-19 PROCEDURE — 74241: CPT

## 2019-11-19 PROCEDURE — 63710000001 BARIUM SULFATE 96 % RECONSTITUTED SUSPENSION: Performed by: INTERNAL MEDICINE

## 2019-11-19 RX ADMIN — BARIUM SULFATE 183 ML: 960 POWDER, FOR SUSPENSION ORAL at 09:27

## 2019-11-26 ENCOUNTER — TELEPHONE (OUTPATIENT)
Dept: INTERNAL MEDICINE | Facility: CLINIC | Age: 34
End: 2019-11-26

## 2019-11-26 ENCOUNTER — CONSULT (OUTPATIENT)
Dept: BARIATRICS/WEIGHT MGMT | Facility: CLINIC | Age: 34
End: 2019-11-26

## 2019-11-26 VITALS
HEART RATE: 86 BPM | RESPIRATION RATE: 18 BRPM | WEIGHT: 301 LBS | BODY MASS INDEX: 37.42 KG/M2 | TEMPERATURE: 97.9 F | HEIGHT: 75 IN | SYSTOLIC BLOOD PRESSURE: 165 MMHG | OXYGEN SATURATION: 99 % | DIASTOLIC BLOOD PRESSURE: 99 MMHG

## 2019-11-26 DIAGNOSIS — K44.9 PARAESOPHAGEAL HIATAL HERNIA: Primary | ICD-10-CM

## 2019-11-26 DIAGNOSIS — E66.9 OBESITY, UNSPECIFIED CLASSIFICATION, UNSPECIFIED OBESITY TYPE, UNSPECIFIED WHETHER SERIOUS COMORBIDITY PRESENT: Primary | ICD-10-CM

## 2019-11-26 PROCEDURE — 99203 OFFICE O/P NEW LOW 30 MIN: CPT | Performed by: SURGERY

## 2019-11-26 RX ORDER — GABAPENTIN 100 MG/1
600 CAPSULE ORAL ONCE
Status: CANCELLED | OUTPATIENT
Start: 2019-11-26 | End: 2019-11-26

## 2019-11-26 RX ORDER — SODIUM CHLORIDE, SODIUM LACTATE, POTASSIUM CHLORIDE, CALCIUM CHLORIDE 600; 310; 30; 20 MG/100ML; MG/100ML; MG/100ML; MG/100ML
150 INJECTION, SOLUTION INTRAVENOUS CONTINUOUS
Status: CANCELLED | OUTPATIENT
Start: 2019-11-26

## 2019-11-26 RX ORDER — ACETAMINOPHEN 500 MG
1000 TABLET ORAL ONCE
Status: CANCELLED | OUTPATIENT
Start: 2019-11-26 | End: 2019-11-26

## 2019-11-26 RX ORDER — PANTOPRAZOLE SODIUM 40 MG/10ML
40 INJECTION, POWDER, LYOPHILIZED, FOR SOLUTION INTRAVENOUS ONCE
Status: CANCELLED | OUTPATIENT
Start: 2019-11-26 | End: 2019-11-26

## 2019-11-26 RX ORDER — SCOLOPAMINE TRANSDERMAL SYSTEM 1 MG/1
1 PATCH, EXTENDED RELEASE TRANSDERMAL ONCE
Status: CANCELLED | OUTPATIENT
Start: 2019-11-26 | End: 2019-11-26

## 2019-11-26 RX ORDER — SODIUM CHLORIDE 0.9 % (FLUSH) 0.9 %
3-10 SYRINGE (ML) INJECTION AS NEEDED
Status: CANCELLED | OUTPATIENT
Start: 2019-11-26

## 2019-11-26 RX ORDER — SODIUM CHLORIDE 0.9 % (FLUSH) 0.9 %
3 SYRINGE (ML) INJECTION EVERY 12 HOURS SCHEDULED
Status: CANCELLED | OUTPATIENT
Start: 2019-11-26

## 2019-11-26 RX ORDER — CHLORHEXIDINE GLUCONATE 0.12 MG/ML
30 RINSE ORAL
Status: CANCELLED | OUTPATIENT
Start: 2019-11-26 | End: 2019-11-26

## 2019-11-26 NOTE — TELEPHONE ENCOUNTER
PT SAW DR PRESTON HOLLIS TODAY 11/26/019, HE IS NEEDING A REFERRAL SENT OVER TO THE OFFICE. IT HAS TO BE DONE TODAY OR PASSPORT WILL NOT COVER IT.

## 2019-12-01 NOTE — H&P (VIEW-ONLY)
[facility] BARIATRIC SURGERY  [address]  [facility] [address]  [phone]      Patient  Name:  Sergio Lucero  :  1985      Date of Visit: 19    Chief Complaint: Intrathoracic stomach    History of Present Illness:  Sergio Lucero is a 34 y.o. male who presents today for evaluation, education and consultation regarding his large intrathoracic paraesophageal hiatal hernia.  He is referred for evaluation by Dr. Juarez Sandhu who discussed the patient's case with me verbally in the last week or so.  The patient says he and Dr. Sandhu her friends.  The patient is a 34-year-old morbidly obese white male who presented this summer with epigastric pain and fatigue.  No chest pain, no significant reflux or regurgitation.  He was also having occasional dark tarry stools.  A thyroid function test panel was unremarkable.  He was referred to Dr. Sandhu who performed upper endoscopy with findings of a large intrathoracic stomach and paraesophageal hiatal hernia.  Subsequent upper GI confirmed this with some organoaxial rotation.  The patient said when he was younger in his college years he had some heartburn and took Nexium but it went away in his mid 20s.  He has no dysphasia.  No pulmonary complaints.  He does not awaken at night choking.  He says when he coughs that can lead to vomiting.  No chest pain all his pain is left-sided epigastric.  He denies personal or family history of bleeding disorders or clotting disorders.      Past Medical History:   Diagnosis Date   • Acid reflux    • ADHD (attention deficit hyperactivity disorder)    • Anemia    • Anxiety    • Depression    • Fatty liver    • History of ankle fracture     RIGHT   • History of arm fracture     BOTH at separate times   • Insomnia    • Lumbar disc herniation     L4-L5   • Obstructive sleep apnea     on CPAP     Past Surgical History:   Procedure Laterality Date   • ADENOIDECTOMY     • KNEE ARTHROSCOPY Right  2009   • TONSILLECTOMY  2009   • WISDOM TOOTH EXTRACTION  1998       Allergies   Allergen Reactions   • Fish-Derived Products Anaphylaxis   • Nuts Anaphylaxis   • Shellfish Allergy Anaphylaxis       Current Outpatient Medications:   •  amphetamine-dextroamphetamine (ADDERALL) 20 MG tablet, Take 1 tablet by mouth Daily., Disp: 30 tablet, Rfl: 0  •  amphetamine-dextroamphetamine XR (ADDERALL XR) 30 MG 24 hr capsule, Take 1 capsule by mouth Every Morning, Disp: 30 capsule, Rfl: 0  •  buPROPion XL (WELLBUTRIN XL) 300 MG 24 hr tablet, Take 1 tablet by mouth Every Morning., Disp: 30 tablet, Rfl: 2  •  HYDROcodone-acetaminophen (NORCO)  MG per tablet, Take 2 tablets by mouth As Needed., Disp: , Rfl:   •  pantoprazole (PROTONIX) 40 MG EC tablet, Take 1 tablet by mouth 30 minutes before breakfast daily., Disp: 90 tablet, Rfl: 3  •  zolpidem (AMBIEN) 10 MG tablet, Take 1 tablet by mouth every night at bedtime., Disp: 30 tablet, Rfl: 0    Social History     Socioeconomic History   • Marital status:      Spouse name: Not on file   • Number of children: 4   • Years of education: Not on file   • Highest education level: Not on file   Tobacco Use   • Smoking status: Never Smoker   • Smokeless tobacco: Never Used   Substance and Sexual Activity   • Alcohol use: Yes     Alcohol/week: 2.4 oz     Types: 4 Cans of beer per week     Frequency: 4 or more times a week   • Drug use: No   • Sexual activity: Yes     Partners: Female   He lives in [place], has 4 kids is  and is in law school.  No tobacco use, moderate alcohol use usually 3-4 beers a week.  Family History   Problem Relation Age of Onset   • Heart attack Father 56   • Obesity Father    • Cancer Mother    • Obesity Mother    • No Known Problems Daughter    • No Known Problems Son    • No Known Problems Maternal Grandmother    • No Known Problems Maternal Grandfather    • No Known Problems Paternal Grandmother    • No Known Problems Paternal Grandfather       Mother has breast cancer and is alive.  Father  at age 56 from a heart attack.  He was hypertensive and a smoker.  He has 2 healthy daughters, the older one had bariatric surgery in [place].  Review of Systems   Constitutional: Negative for chills, diaphoresis, fever and unexpected weight loss.   HENT: Negative for congestion and facial swelling.    Eyes: Negative for blurred vision, double vision and discharge.   Respiratory: Negative for chest tightness, shortness of breath and stridor.    Cardiovascular: Negative for chest pain, palpitations and leg swelling.   Gastrointestinal: Positive for abdominal pain, diarrhea and vomiting. Negative for blood in stool, constipation and nausea.   Endocrine: Negative for polydipsia.   Genitourinary: Negative for dysuria, frequency and hematuria.   Musculoskeletal: Positive for myalgias. Negative for arthralgias.   Skin: Negative for color change.   Allergic/Immunologic: Negative for immunocompromised state.   Neurological: Negative for confusion.   Psychiatric/Behavioral: Negative for self-injury.       I have reviewed the ROS and confirm that it's accurate today.    Physical Exam:  Vital Signs:  Weight: (!) 137 kg (301 lb)   Body mass index is 37.62 kg/m².  Temp: 97.9 °F (36.6 °C)   Heart Rate: 86   BP: 165/99     Physical Exam   Constitutional: He is oriented to person, place, and time. He appears well-developed and well-nourished.   HENT:   Head: Normocephalic and atraumatic.   Nose: Nose normal.   Eyes: Conjunctivae and EOM are normal. Pupils are equal, round, and reactive to light.   Neck: Normal range of motion. Neck supple. Carotid bruit is not present. No tracheal deviation present. No thyromegaly present.   Cardiovascular: Normal rate, regular rhythm and normal heart sounds.   Pulmonary/Chest: Effort normal and breath sounds normal. No respiratory distress.   Abdominal: Soft. He exhibits no distension. There is no hepatosplenomegaly. There is no tenderness.    Musculoskeletal: Normal range of motion. He exhibits no edema or deformity.   Neurological: He is alert and oriented to person, place, and time. No cranial nerve deficit. Coordination normal.   Skin: Skin is warm and dry. No rash noted.   Psychiatric: He has a normal mood and affect. His behavior is normal. Judgment and thought content normal.   Vitals reviewed.      Patient Active Problem List   Diagnosis   • Chronic midline low back pain without sciatica   • Obstructive sleep apnea   • Anxiety with depression   • Insomnia   • Class 2 obesity due to excess calories without serious comorbidity with body mass index (BMI) of 35.0 to 35.9 in adult   • Attention deficit disorder (ADD) without hyperactivity   • Lumbosacral spondylosis without myelopathy   • Thoracic back pain   • Thoracic spondylosis without myelopathy   • Chronic pain disorder     Upper GI dated 11/19/2019 shows mild reflux to the level of the mid esophagus and intrathoracic stomach with organoaxial rotation.  A large sized hiatal hernia noted.  I reviewed Dr. Sandhu's evaluation dated 11/7/2019.  I reviewed an ultrasound of the abdomen dated 11/14/2019 showing fatty infiltration of the liver.  I reviewed an EGD by Dr. Sandhu dated 11/8/2019 showing a large hiatal hernia suspect with paraesophageal component type III versus 4.  Ulcerative gastritis within the hernia.  Antral pallor.  Assessment:    Sergio Lucero is a 34 y.o. year old male with a large symptomatic paraesophageal hiatal hernia with intrathoracic stomach.  He also has a BMI of 37 and obesity related comorbidities such as herniated disc on chronic narcotics, obstructive sleep apnea, and fatty liver.  He is interested in pursuing concomitant bariatric surgery if possible.  His hernia is symptomatic and there does appear to be some pallor/ischemia of the antrum on endoscopy and organoaxial rotation on upper GI.  To have bariatric surgery under insurance a 6-month physician supervised diet  would be required.  Ultimately after discussion of the risks, benefits alternative therapy he does not wish to wait 6 months and have concomitant bariatric surgery.  He understands that there is an increased risk of paraesophageal hiatal hernia recurrence with morbid obesity.  Laparoscopic hiatal hernia repair and fundoplication should not affect his ability to have bariatric surgery in the future if desired.    Plan: Risks, benefits and alternative therapies were discussed at length including diagrams and he wishes to proceed with laparoscopic repair of his large mixed type paraesophageal hiatal hernia possibly with mesh and reduction of his intrathoracic stomach with laparoscopic Nissen fundoplication.  Thank you Dr. Sandhu for the opportunity to evaluate Mr. Lucero.        Sky Angel MD              Answers for HPI/ROS submitted by the patient on 11/24/2019   Abdominal pain  Chronicity: chronic  Onset: more than 1 month ago  Onset quality: sudden  Frequency: daily  Episode duration: 4 hours  Progression since onset: gradually worsening  Pain location: LUQ  Pain - numeric: 7/10  Pain quality: aching, cramping, sharp  Radiates to: does not radiate  anorexia: No  belching: Yes  flatus: No  headaches: No  hematochezia: No  melena: Yes  Aggravated by: certain positions, deep breathing  Relieved by: belching, passing flatus  Diagnostic workup: barium enema, GI consult, ultrasound, upper endoscopy

## 2019-12-01 NOTE — PROGRESS NOTES
[facility] BARIATRIC SURGERY  [address]  [facility] [address]  [phone]      Patient  Name:  Sergio Lucero  :  1985      Date of Visit: 19    Chief Complaint: Intrathoracic stomach    History of Present Illness:  Sergio Lucero is a 34 y.o. male who presents today for evaluation, education and consultation regarding his large intrathoracic paraesophageal hiatal hernia.  He is referred for evaluation by Dr. Juarez Sandhu who discussed the patient's case with me verbally in the last week or so.  The patient says he and Dr. Sandhu her friends.  The patient is a 34-year-old morbidly obese white male who presented this summer with epigastric pain and fatigue.  No chest pain, no significant reflux or regurgitation.  He was also having occasional dark tarry stools.  A thyroid function test panel was unremarkable.  He was referred to Dr. Sandhu who performed upper endoscopy with findings of a large intrathoracic stomach and paraesophageal hiatal hernia.  Subsequent upper GI confirmed this with some organoaxial rotation.  The patient said when he was younger in his college years he had some heartburn and took Nexium but it went away in his mid 20s.  He has no dysphasia.  No pulmonary complaints.  He does not awaken at night choking.  He says when he coughs that can lead to vomiting.  No chest pain all his pain is left-sided epigastric.  He denies personal or family history of bleeding disorders or clotting disorders.      Past Medical History:   Diagnosis Date   • Acid reflux    • ADHD (attention deficit hyperactivity disorder)    • Anemia    • Anxiety    • Depression    • Fatty liver    • History of ankle fracture     RIGHT   • History of arm fracture     BOTH at separate times   • Insomnia    • Lumbar disc herniation     L4-L5   • Obstructive sleep apnea     on CPAP     Past Surgical History:   Procedure Laterality Date   • ADENOIDECTOMY     • KNEE ARTHROSCOPY Right  2009   • TONSILLECTOMY  2009   • WISDOM TOOTH EXTRACTION  1998       Allergies   Allergen Reactions   • Fish-Derived Products Anaphylaxis   • Nuts Anaphylaxis   • Shellfish Allergy Anaphylaxis       Current Outpatient Medications:   •  amphetamine-dextroamphetamine (ADDERALL) 20 MG tablet, Take 1 tablet by mouth Daily., Disp: 30 tablet, Rfl: 0  •  amphetamine-dextroamphetamine XR (ADDERALL XR) 30 MG 24 hr capsule, Take 1 capsule by mouth Every Morning, Disp: 30 capsule, Rfl: 0  •  buPROPion XL (WELLBUTRIN XL) 300 MG 24 hr tablet, Take 1 tablet by mouth Every Morning., Disp: 30 tablet, Rfl: 2  •  HYDROcodone-acetaminophen (NORCO)  MG per tablet, Take 2 tablets by mouth As Needed., Disp: , Rfl:   •  pantoprazole (PROTONIX) 40 MG EC tablet, Take 1 tablet by mouth 30 minutes before breakfast daily., Disp: 90 tablet, Rfl: 3  •  zolpidem (AMBIEN) 10 MG tablet, Take 1 tablet by mouth every night at bedtime., Disp: 30 tablet, Rfl: 0    Social History     Socioeconomic History   • Marital status:      Spouse name: Not on file   • Number of children: 4   • Years of education: Not on file   • Highest education level: Not on file   Tobacco Use   • Smoking status: Never Smoker   • Smokeless tobacco: Never Used   Substance and Sexual Activity   • Alcohol use: Yes     Alcohol/week: 2.4 oz     Types: 4 Cans of beer per week     Frequency: 4 or more times a week   • Drug use: No   • Sexual activity: Yes     Partners: Female   He lives in [place], has 4 kids is  and is in law school.  No tobacco use, moderate alcohol use usually 3-4 beers a week.  Family History   Problem Relation Age of Onset   • Heart attack Father 56   • Obesity Father    • Cancer Mother    • Obesity Mother    • No Known Problems Daughter    • No Known Problems Son    • No Known Problems Maternal Grandmother    • No Known Problems Maternal Grandfather    • No Known Problems Paternal Grandmother    • No Known Problems Paternal Grandfather       Mother has breast cancer and is alive.  Father  at age 56 from a heart attack.  He was hypertensive and a smoker.  He has 2 healthy daughters, the older one had bariatric surgery in [place].  Review of Systems   Constitutional: Negative for chills, diaphoresis, fever and unexpected weight loss.   HENT: Negative for congestion and facial swelling.    Eyes: Negative for blurred vision, double vision and discharge.   Respiratory: Negative for chest tightness, shortness of breath and stridor.    Cardiovascular: Negative for chest pain, palpitations and leg swelling.   Gastrointestinal: Positive for abdominal pain, diarrhea and vomiting. Negative for blood in stool, constipation and nausea.   Endocrine: Negative for polydipsia.   Genitourinary: Negative for dysuria, frequency and hematuria.   Musculoskeletal: Positive for myalgias. Negative for arthralgias.   Skin: Negative for color change.   Allergic/Immunologic: Negative for immunocompromised state.   Neurological: Negative for confusion.   Psychiatric/Behavioral: Negative for self-injury.       I have reviewed the ROS and confirm that it's accurate today.    Physical Exam:  Vital Signs:  Weight: (!) 137 kg (301 lb)   Body mass index is 37.62 kg/m².  Temp: 97.9 °F (36.6 °C)   Heart Rate: 86   BP: 165/99     Physical Exam   Constitutional: He is oriented to person, place, and time. He appears well-developed and well-nourished.   HENT:   Head: Normocephalic and atraumatic.   Nose: Nose normal.   Eyes: Conjunctivae and EOM are normal. Pupils are equal, round, and reactive to light.   Neck: Normal range of motion. Neck supple. Carotid bruit is not present. No tracheal deviation present. No thyromegaly present.   Cardiovascular: Normal rate, regular rhythm and normal heart sounds.   Pulmonary/Chest: Effort normal and breath sounds normal. No respiratory distress.   Abdominal: Soft. He exhibits no distension. There is no hepatosplenomegaly. There is no tenderness.    Musculoskeletal: Normal range of motion. He exhibits no edema or deformity.   Neurological: He is alert and oriented to person, place, and time. No cranial nerve deficit. Coordination normal.   Skin: Skin is warm and dry. No rash noted.   Psychiatric: He has a normal mood and affect. His behavior is normal. Judgment and thought content normal.   Vitals reviewed.      Patient Active Problem List   Diagnosis   • Chronic midline low back pain without sciatica   • Obstructive sleep apnea   • Anxiety with depression   • Insomnia   • Class 2 obesity due to excess calories without serious comorbidity with body mass index (BMI) of 35.0 to 35.9 in adult   • Attention deficit disorder (ADD) without hyperactivity   • Lumbosacral spondylosis without myelopathy   • Thoracic back pain   • Thoracic spondylosis without myelopathy   • Chronic pain disorder     Upper GI dated 11/19/2019 shows mild reflux to the level of the mid esophagus and intrathoracic stomach with organoaxial rotation.  A large sized hiatal hernia noted.  I reviewed Dr. Sandhu's evaluation dated 11/7/2019.  I reviewed an ultrasound of the abdomen dated 11/14/2019 showing fatty infiltration of the liver.  I reviewed an EGD by Dr. Sandhu dated 11/8/2019 showing a large hiatal hernia suspect with paraesophageal component type III versus 4.  Ulcerative gastritis within the hernia.  Antral pallor.  Assessment:    Sergio Lucero is a 34 y.o. year old male with a large symptomatic paraesophageal hiatal hernia with intrathoracic stomach.  He also has a BMI of 37 and obesity related comorbidities such as herniated disc on chronic narcotics, obstructive sleep apnea, and fatty liver.  He is interested in pursuing concomitant bariatric surgery if possible.  His hernia is symptomatic and there does appear to be some pallor/ischemia of the antrum on endoscopy and organoaxial rotation on upper GI.  To have bariatric surgery under insurance a 6-month physician supervised diet  would be required.  Ultimately after discussion of the risks, benefits alternative therapy he does not wish to wait 6 months and have concomitant bariatric surgery.  He understands that there is an increased risk of paraesophageal hiatal hernia recurrence with morbid obesity.  Laparoscopic hiatal hernia repair and fundoplication should not affect his ability to have bariatric surgery in the future if desired.    Plan: Risks, benefits and alternative therapies were discussed at length including diagrams and he wishes to proceed with laparoscopic repair of his large mixed type paraesophageal hiatal hernia possibly with mesh and reduction of his intrathoracic stomach with laparoscopic Nissen fundoplication.  Thank you Dr. Sandhu for the opportunity to evaluate Mr. Lucero.        Sky Angel MD              Answers for HPI/ROS submitted by the patient on 11/24/2019   Abdominal pain  Chronicity: chronic  Onset: more than 1 month ago  Onset quality: sudden  Frequency: daily  Episode duration: 4 hours  Progression since onset: gradually worsening  Pain location: LUQ  Pain - numeric: 7/10  Pain quality: aching, cramping, sharp  Radiates to: does not radiate  anorexia: No  belching: Yes  flatus: No  headaches: No  hematochezia: No  melena: Yes  Aggravated by: certain positions, deep breathing  Relieved by: belching, passing flatus  Diagnostic workup: barium enema, GI consult, ultrasound, upper endoscopy

## 2019-12-02 PROBLEM — K44.9 PARAESOPHAGEAL HIATAL HERNIA: Status: ACTIVE | Noted: 2019-12-02

## 2019-12-09 DIAGNOSIS — G47.00 INSOMNIA, UNSPECIFIED TYPE: ICD-10-CM

## 2019-12-10 DIAGNOSIS — F98.8 ATTENTION DEFICIT DISORDER (ADD) WITHOUT HYPERACTIVITY: ICD-10-CM

## 2019-12-10 DIAGNOSIS — G47.00 INSOMNIA, UNSPECIFIED TYPE: ICD-10-CM

## 2019-12-10 RX ORDER — ZOLPIDEM TARTRATE 10 MG/1
10 TABLET ORAL
Qty: 30 TABLET | Refills: 0 | Status: SHIPPED | OUTPATIENT
Start: 2019-12-10 | End: 2020-01-02 | Stop reason: SDUPTHER

## 2019-12-10 RX ORDER — DEXTROAMPHETAMINE SACCHARATE, AMPHETAMINE ASPARTATE, DEXTROAMPHETAMINE SULFATE AND AMPHETAMINE SULFATE 5; 5; 5; 5 MG/1; MG/1; MG/1; MG/1
1 TABLET ORAL DAILY
Qty: 30 TABLET | Refills: 0 | Status: SHIPPED | OUTPATIENT
Start: 2019-12-10 | End: 2020-01-02 | Stop reason: SDUPTHER

## 2019-12-10 RX ORDER — ZOLPIDEM TARTRATE 10 MG/1
TABLET ORAL
Qty: 30 TABLET | Refills: 0 | OUTPATIENT
Start: 2019-12-10

## 2019-12-10 RX ORDER — ZOLPIDEM TARTRATE 10 MG/1
10 TABLET ORAL
Qty: 30 TABLET | Refills: 0 | Status: CANCELLED | OUTPATIENT
Start: 2019-12-10

## 2019-12-10 RX ORDER — DEXTROAMPHETAMINE SACCHARATE, AMPHETAMINE ASPARTATE MONOHYDRATE, DEXTROAMPHETAMINE SULFATE AND AMPHETAMINE SULFATE 7.5; 7.5; 7.5; 7.5 MG/1; MG/1; MG/1; MG/1
30 CAPSULE, EXTENDED RELEASE ORAL EVERY MORNING
Qty: 30 CAPSULE | Refills: 0 | Status: SHIPPED | OUTPATIENT
Start: 2019-12-10 | End: 2020-01-02 | Stop reason: SDUPTHER

## 2019-12-15 ENCOUNTER — ANESTHESIA EVENT (OUTPATIENT)
Dept: PERIOP | Facility: HOSPITAL | Age: 34
End: 2019-12-15

## 2019-12-15 ENCOUNTER — APPOINTMENT (OUTPATIENT)
Dept: PREADMISSION TESTING | Facility: HOSPITAL | Age: 34
End: 2019-12-15

## 2019-12-15 VITALS — BODY MASS INDEX: 37.42 KG/M2 | HEIGHT: 75 IN | WEIGHT: 301 LBS

## 2019-12-15 DIAGNOSIS — K44.9 PARAESOPHAGEAL HIATAL HERNIA: ICD-10-CM

## 2019-12-15 LAB
ABO GROUP BLD: NORMAL
BLD GP AB SCN SERPL QL: NEGATIVE
DEPRECATED RDW RBC AUTO: 43 FL (ref 37–54)
ERYTHROCYTE [DISTWIDTH] IN BLOOD BY AUTOMATED COUNT: 14.1 % (ref 12.3–15.4)
HBA1C MFR BLD: 5.5 % (ref 4.8–5.6)
HCT VFR BLD AUTO: 35.4 % (ref 37.5–51)
HGB BLD-MCNC: 10.4 G/DL (ref 13–17.7)
MCH RBC QN AUTO: 24.5 PG (ref 26.6–33)
MCHC RBC AUTO-ENTMCNC: 29.4 G/DL (ref 31.5–35.7)
MCV RBC AUTO: 83.3 FL (ref 79–97)
PLATELET # BLD AUTO: 392 10*3/MM3 (ref 140–450)
PMV BLD AUTO: 9.8 FL (ref 6–12)
RBC # BLD AUTO: 4.25 10*6/MM3 (ref 4.14–5.8)
RH BLD: POSITIVE
T&S EXPIRATION DATE: NORMAL
WBC NRBC COR # BLD: 7.06 10*3/MM3 (ref 3.4–10.8)

## 2019-12-15 PROCEDURE — 83036 HEMOGLOBIN GLYCOSYLATED A1C: CPT | Performed by: ANESTHESIOLOGY

## 2019-12-15 PROCEDURE — 86850 RBC ANTIBODY SCREEN: CPT | Performed by: SURGERY

## 2019-12-15 PROCEDURE — 93010 ELECTROCARDIOGRAM REPORT: CPT | Performed by: INTERNAL MEDICINE

## 2019-12-15 PROCEDURE — 86900 BLOOD TYPING SEROLOGIC ABO: CPT | Performed by: SURGERY

## 2019-12-15 PROCEDURE — 93005 ELECTROCARDIOGRAM TRACING: CPT

## 2019-12-15 PROCEDURE — 86901 BLOOD TYPING SEROLOGIC RH(D): CPT | Performed by: SURGERY

## 2019-12-15 PROCEDURE — 85027 COMPLETE CBC AUTOMATED: CPT | Performed by: ANESTHESIOLOGY

## 2019-12-15 PROCEDURE — 36415 COLL VENOUS BLD VENIPUNCTURE: CPT

## 2019-12-16 ENCOUNTER — ANESTHESIA (OUTPATIENT)
Dept: PERIOP | Facility: HOSPITAL | Age: 34
End: 2019-12-16

## 2019-12-16 ENCOUNTER — HOSPITAL ENCOUNTER (OUTPATIENT)
Facility: HOSPITAL | Age: 34
Discharge: HOME | End: 2019-12-17
Attending: SURGERY | Admitting: SURGERY

## 2019-12-16 DIAGNOSIS — K44.9 PARAESOPHAGEAL HIATAL HERNIA: ICD-10-CM

## 2019-12-16 LAB
ABO GROUP BLD: NORMAL
RH BLD: POSITIVE

## 2019-12-16 PROCEDURE — C1781 MESH (IMPLANTABLE): HCPCS | Performed by: SURGERY

## 2019-12-16 PROCEDURE — 25010000002 PROPOFOL 10 MG/ML EMULSION: Performed by: NURSE ANESTHETIST, CERTIFIED REGISTERED

## 2019-12-16 PROCEDURE — 25010000003 CEFAZOLIN IN DEXTROSE 2-4 GM/100ML-% SOLUTION: Performed by: SURGERY

## 2019-12-16 PROCEDURE — A9270 NON-COVERED ITEM OR SERVICE: HCPCS | Performed by: SURGERY

## 2019-12-16 PROCEDURE — 94799 UNLISTED PULMONARY SVC/PX: CPT

## 2019-12-16 PROCEDURE — 88302 TISSUE EXAM BY PATHOLOGIST: CPT | Performed by: SURGERY

## 2019-12-16 PROCEDURE — 63710000001 HYDROCODONE-ACETAMINOPHEN 7.5-325 MG TABLET: Performed by: SURGERY

## 2019-12-16 PROCEDURE — 43282 LAP PARAESOPH HER RPR W/MESH: CPT | Performed by: SURGERY

## 2019-12-16 PROCEDURE — 63710000001 HYDROMORPHONE 2 MG TABLET: Performed by: SURGERY

## 2019-12-16 PROCEDURE — 25010000002 HYDROMORPHONE PER 4 MG: Performed by: NURSE ANESTHETIST, CERTIFIED REGISTERED

## 2019-12-16 PROCEDURE — 94660 CPAP INITIATION&MGMT: CPT

## 2019-12-16 PROCEDURE — 25010000002 DEXAMETHASONE SODIUM PHOSPHATE 10 MG/ML SOLUTION: Performed by: ANESTHESIOLOGY

## 2019-12-16 PROCEDURE — 86901 BLOOD TYPING SEROLOGIC RH(D): CPT

## 2019-12-16 PROCEDURE — 86900 BLOOD TYPING SEROLOGIC ABO: CPT

## 2019-12-16 PROCEDURE — 25010000002 DEXAMETHASONE SODIUM PHOSPHATE 10 MG/ML SOLUTION: Performed by: NURSE ANESTHETIST, CERTIFIED REGISTERED

## 2019-12-16 PROCEDURE — 25010000002 FENTANYL CITRATE (PF) 100 MCG/2ML SOLUTION: Performed by: NURSE ANESTHETIST, CERTIFIED REGISTERED

## 2019-12-16 PROCEDURE — 63710000001 GABAPENTIN 300 MG CAPSULE: Performed by: SURGERY

## 2019-12-16 PROCEDURE — 63710000001 ACETAMINOPHEN 500 MG TABLET: Performed by: SURGERY

## 2019-12-16 PROCEDURE — 25010000002 ONDANSETRON PER 1 MG: Performed by: NURSE ANESTHETIST, CERTIFIED REGISTERED

## 2019-12-16 PROCEDURE — 25010000002 CEFAZOLIN PER 500 MG: Performed by: SURGERY

## 2019-12-16 PROCEDURE — 25010000002 ENOXAPARIN PER 10 MG: Performed by: SURGERY

## 2019-12-16 PROCEDURE — 63710000001 ZOLPIDEM 5 MG TABLET: Performed by: SURGERY

## 2019-12-16 PROCEDURE — G0378 HOSPITAL OBSERVATION PER HR: HCPCS

## 2019-12-16 PROCEDURE — 25010000002 MORPHINE PER 10 MG: Performed by: SURGERY

## 2019-12-16 PROCEDURE — 63710000001 SCOPOLAMINE 1.5 MG/3DAYS PATCH 72 HOUR: Performed by: SURGERY

## 2019-12-16 PROCEDURE — 63710000001 CHLORHEXIDINE 0.12 % SOLUTION: Performed by: SURGERY

## 2019-12-16 PROCEDURE — 25010000002 BUPRENORPHINE PER 0.1 MG: Performed by: ANESTHESIOLOGY

## 2019-12-16 PROCEDURE — 25010000003 CEFAZOLIN PER 500 MG: Performed by: NURSE ANESTHETIST, CERTIFIED REGISTERED

## 2019-12-16 PROCEDURE — 25010000002 NEOSTIGMINE 10 MG/10ML SOLUTION: Performed by: NURSE ANESTHETIST, CERTIFIED REGISTERED

## 2019-12-16 DEVICE — PHASIX ST MESH, RECTANGLE
Type: IMPLANTABLE DEVICE | Site: ABDOMEN | Status: FUNCTIONAL
Brand: PHASIX ST MESH

## 2019-12-16 RX ORDER — ALPRAZOLAM 0.25 MG/1
0.25 TABLET ORAL ONCE AS NEEDED
Status: DISCONTINUED | OUTPATIENT
Start: 2019-12-16 | End: 2019-12-17 | Stop reason: HOSPADM

## 2019-12-16 RX ORDER — DEXAMETHASONE SODIUM PHOSPHATE 10 MG/ML
INJECTION, SOLUTION INTRAMUSCULAR; INTRAVENOUS
Status: COMPLETED | OUTPATIENT
Start: 2019-12-16 | End: 2019-12-16

## 2019-12-16 RX ORDER — GABAPENTIN 300 MG/1
600 CAPSULE ORAL ONCE
Status: COMPLETED | OUTPATIENT
Start: 2019-12-16 | End: 2019-12-16

## 2019-12-16 RX ORDER — DEXTROAMPHETAMINE SACCHARATE, AMPHETAMINE ASPARTATE MONOHYDRATE, DEXTROAMPHETAMINE SULFATE AND AMPHETAMINE SULFATE 7.5; 7.5; 7.5; 7.5 MG/1; MG/1; MG/1; MG/1
30 CAPSULE, EXTENDED RELEASE ORAL DAILY
Status: DISCONTINUED | OUTPATIENT
Start: 2019-12-17 | End: 2019-12-16

## 2019-12-16 RX ORDER — FAMOTIDINE 10 MG/ML
20 INJECTION, SOLUTION INTRAVENOUS ONCE
Status: DISCONTINUED | OUTPATIENT
Start: 2019-12-16 | End: 2019-12-16

## 2019-12-16 RX ORDER — CHLORHEXIDINE GLUCONATE 0.12 MG/ML
30 RINSE ORAL
Status: DISCONTINUED | OUTPATIENT
Start: 2019-12-16 | End: 2019-12-16

## 2019-12-16 RX ORDER — BUPIVACAINE HYDROCHLORIDE 2.5 MG/ML
INJECTION, SOLUTION EPIDURAL; INFILTRATION; INTRACAUDAL
Status: COMPLETED | OUTPATIENT
Start: 2019-12-16 | End: 2019-12-16

## 2019-12-16 RX ORDER — CEFAZOLIN SODIUM 1 G/3ML
INJECTION, POWDER, FOR SOLUTION INTRAMUSCULAR; INTRAVENOUS AS NEEDED
Status: DISCONTINUED | OUTPATIENT
Start: 2019-12-16 | End: 2019-12-16 | Stop reason: SURG

## 2019-12-16 RX ORDER — NALOXONE HCL 0.4 MG/ML
0.4 VIAL (ML) INJECTION
Status: DISCONTINUED | OUTPATIENT
Start: 2019-12-16 | End: 2019-12-17 | Stop reason: HOSPADM

## 2019-12-16 RX ORDER — OXYCODONE AND ACETAMINOPHEN 7.5; 325 MG/1; MG/1
1 TABLET ORAL ONCE AS NEEDED
Status: DISCONTINUED | OUTPATIENT
Start: 2019-12-16 | End: 2019-12-16 | Stop reason: HOSPADM

## 2019-12-16 RX ORDER — SIMETHICONE 80 MG
80 TABLET,CHEWABLE ORAL 4 TIMES DAILY PRN
Status: DISCONTINUED | OUTPATIENT
Start: 2019-12-16 | End: 2019-12-17 | Stop reason: HOSPADM

## 2019-12-16 RX ORDER — ALBUTEROL SULFATE 2.5 MG/3ML
2.5 SOLUTION RESPIRATORY (INHALATION) EVERY 4 HOURS PRN
Status: DISCONTINUED | OUTPATIENT
Start: 2019-12-16 | End: 2019-12-17 | Stop reason: HOSPADM

## 2019-12-16 RX ORDER — PANTOPRAZOLE SODIUM 40 MG/10ML
40 INJECTION, POWDER, LYOPHILIZED, FOR SOLUTION INTRAVENOUS ONCE
Status: DISCONTINUED | OUTPATIENT
Start: 2019-12-16 | End: 2019-12-16

## 2019-12-16 RX ORDER — HYDROMORPHONE HYDROCHLORIDE 2 MG/1
2 TABLET ORAL EVERY 4 HOURS PRN
Status: DISCONTINUED | OUTPATIENT
Start: 2019-12-16 | End: 2019-12-17 | Stop reason: HOSPADM

## 2019-12-16 RX ORDER — HYDROMORPHONE HYDROCHLORIDE 1 MG/ML
0.5 INJECTION, SOLUTION INTRAMUSCULAR; INTRAVENOUS; SUBCUTANEOUS
Status: COMPLETED | OUTPATIENT
Start: 2019-12-16 | End: 2019-12-16

## 2019-12-16 RX ORDER — FENTANYL CITRATE 50 UG/ML
INJECTION, SOLUTION INTRAMUSCULAR; INTRAVENOUS AS NEEDED
Status: DISCONTINUED | OUTPATIENT
Start: 2019-12-16 | End: 2019-12-16 | Stop reason: SURG

## 2019-12-16 RX ORDER — ACETAMINOPHEN 500 MG
1000 TABLET ORAL EVERY 12 HOURS
Status: DISCONTINUED | OUTPATIENT
Start: 2019-12-16 | End: 2019-12-17 | Stop reason: HOSPADM

## 2019-12-16 RX ORDER — CEFAZOLIN SODIUM 2 G/100ML
2 INJECTION, SOLUTION INTRAVENOUS EVERY 8 HOURS
Status: COMPLETED | OUTPATIENT
Start: 2019-12-16 | End: 2019-12-17

## 2019-12-16 RX ORDER — SODIUM CHLORIDE AND POTASSIUM CHLORIDE 150; 450 MG/100ML; MG/100ML
125 INJECTION, SOLUTION INTRAVENOUS CONTINUOUS
Status: DISCONTINUED | OUTPATIENT
Start: 2019-12-16 | End: 2019-12-17 | Stop reason: HOSPADM

## 2019-12-16 RX ORDER — SCOLOPAMINE TRANSDERMAL SYSTEM 1 MG/1
1 PATCH, EXTENDED RELEASE TRANSDERMAL ONCE
Status: DISCONTINUED | OUTPATIENT
Start: 2019-12-16 | End: 2019-12-16

## 2019-12-16 RX ORDER — LORAZEPAM 1 MG/1
1 TABLET ORAL EVERY 12 HOURS PRN
Status: DISCONTINUED | OUTPATIENT
Start: 2019-12-16 | End: 2019-12-17 | Stop reason: HOSPADM

## 2019-12-16 RX ORDER — PROMETHAZINE HYDROCHLORIDE 25 MG/ML
6.25 INJECTION, SOLUTION INTRAMUSCULAR; INTRAVENOUS ONCE AS NEEDED
Status: DISCONTINUED | OUTPATIENT
Start: 2019-12-16 | End: 2019-12-16 | Stop reason: HOSPADM

## 2019-12-16 RX ORDER — SODIUM CHLORIDE 0.9 % (FLUSH) 0.9 %
10 SYRINGE (ML) INJECTION AS NEEDED
Status: DISCONTINUED | OUTPATIENT
Start: 2019-12-16 | End: 2019-12-16

## 2019-12-16 RX ORDER — SODIUM CHLORIDE 0.9 % (FLUSH) 0.9 %
3-10 SYRINGE (ML) INJECTION AS NEEDED
Status: DISCONTINUED | OUTPATIENT
Start: 2019-12-16 | End: 2019-12-16

## 2019-12-16 RX ORDER — PROMETHAZINE HYDROCHLORIDE 25 MG/1
25 TABLET ORAL ONCE AS NEEDED
Status: DISCONTINUED | OUTPATIENT
Start: 2019-12-16 | End: 2019-12-16 | Stop reason: HOSPADM

## 2019-12-16 RX ORDER — SODIUM CHLORIDE 0.9 % (FLUSH) 0.9 %
10 SYRINGE (ML) INJECTION EVERY 12 HOURS SCHEDULED
Status: DISCONTINUED | OUTPATIENT
Start: 2019-12-16 | End: 2019-12-16

## 2019-12-16 RX ORDER — BUPROPION HYDROCHLORIDE 150 MG/1
300 TABLET ORAL EVERY MORNING
Status: DISCONTINUED | OUTPATIENT
Start: 2019-12-17 | End: 2019-12-17 | Stop reason: HOSPADM

## 2019-12-16 RX ORDER — MAGNESIUM HYDROXIDE 1200 MG/15ML
LIQUID ORAL AS NEEDED
Status: DISCONTINUED | OUTPATIENT
Start: 2019-12-16 | End: 2019-12-16 | Stop reason: HOSPADM

## 2019-12-16 RX ORDER — PROPOFOL 10 MG/ML
VIAL (ML) INTRAVENOUS AS NEEDED
Status: DISCONTINUED | OUTPATIENT
Start: 2019-12-16 | End: 2019-12-16 | Stop reason: SURG

## 2019-12-16 RX ORDER — ACETAMINOPHEN 650 MG/1
650 SUPPOSITORY RECTAL ONCE AS NEEDED
Status: DISCONTINUED | OUTPATIENT
Start: 2019-12-16 | End: 2019-12-16 | Stop reason: HOSPADM

## 2019-12-16 RX ORDER — LIDOCAINE HYDROCHLORIDE 10 MG/ML
INJECTION, SOLUTION EPIDURAL; INFILTRATION; INTRACAUDAL; PERINEURAL AS NEEDED
Status: DISCONTINUED | OUTPATIENT
Start: 2019-12-16 | End: 2019-12-16 | Stop reason: SURG

## 2019-12-16 RX ORDER — HYDROCODONE BITARTRATE AND ACETAMINOPHEN 7.5; 325 MG/1; MG/1
1 TABLET ORAL EVERY 4 HOURS PRN
Status: DISCONTINUED | OUTPATIENT
Start: 2019-12-16 | End: 2019-12-17 | Stop reason: HOSPADM

## 2019-12-16 RX ORDER — PANTOPRAZOLE SODIUM 40 MG/10ML
40 INJECTION, POWDER, LYOPHILIZED, FOR SOLUTION INTRAVENOUS
Status: DISCONTINUED | OUTPATIENT
Start: 2019-12-17 | End: 2019-12-17 | Stop reason: HOSPADM

## 2019-12-16 RX ORDER — PROMETHAZINE HYDROCHLORIDE 25 MG/ML
12.5 INJECTION, SOLUTION INTRAMUSCULAR; INTRAVENOUS EVERY 6 HOURS PRN
Status: DISCONTINUED | OUTPATIENT
Start: 2019-12-16 | End: 2019-12-17 | Stop reason: HOSPADM

## 2019-12-16 RX ORDER — PROCHLORPERAZINE MALEATE 10 MG
10 TABLET ORAL EVERY 6 HOURS PRN
Status: DISCONTINUED | OUTPATIENT
Start: 2019-12-16 | End: 2019-12-17 | Stop reason: HOSPADM

## 2019-12-16 RX ORDER — MORPHINE SULFATE 4 MG/ML
4 INJECTION, SOLUTION INTRAMUSCULAR; INTRAVENOUS
Status: DISCONTINUED | OUTPATIENT
Start: 2019-12-16 | End: 2019-12-17 | Stop reason: HOSPADM

## 2019-12-16 RX ORDER — PROMETHAZINE HYDROCHLORIDE 12.5 MG/1
12.5 TABLET ORAL EVERY 6 HOURS PRN
Status: DISCONTINUED | OUTPATIENT
Start: 2019-12-16 | End: 2019-12-17 | Stop reason: HOSPADM

## 2019-12-16 RX ORDER — DEXTROAMPHETAMINE SACCHARATE, AMPHETAMINE ASPARTATE, DEXTROAMPHETAMINE SULFATE AND AMPHETAMINE SULFATE 5; 5; 5; 5 MG/1; MG/1; MG/1; MG/1
20 TABLET ORAL DAILY
Status: DISCONTINUED | OUTPATIENT
Start: 2019-12-17 | End: 2019-12-16

## 2019-12-16 RX ORDER — ONDANSETRON 2 MG/ML
4 INJECTION INTRAMUSCULAR; INTRAVENOUS ONCE AS NEEDED
Status: DISCONTINUED | OUTPATIENT
Start: 2019-12-16 | End: 2019-12-16 | Stop reason: HOSPADM

## 2019-12-16 RX ORDER — BUPRENORPHINE HYDROCHLORIDE 0.32 MG/ML
INJECTION INTRAMUSCULAR; INTRAVENOUS
Status: COMPLETED | OUTPATIENT
Start: 2019-12-16 | End: 2019-12-16

## 2019-12-16 RX ORDER — ZOLPIDEM TARTRATE 5 MG/1
10 TABLET ORAL NIGHTLY PRN
Status: DISCONTINUED | OUTPATIENT
Start: 2019-12-16 | End: 2019-12-17 | Stop reason: HOSPADM

## 2019-12-16 RX ORDER — NEOSTIGMINE METHYLSULFATE 1 MG/ML
INJECTION, SOLUTION INTRAVENOUS AS NEEDED
Status: DISCONTINUED | OUTPATIENT
Start: 2019-12-16 | End: 2019-12-16 | Stop reason: SURG

## 2019-12-16 RX ORDER — IPRATROPIUM BROMIDE AND ALBUTEROL SULFATE 2.5; .5 MG/3ML; MG/3ML
3 SOLUTION RESPIRATORY (INHALATION) ONCE AS NEEDED
Status: DISCONTINUED | OUTPATIENT
Start: 2019-12-16 | End: 2019-12-16 | Stop reason: HOSPADM

## 2019-12-16 RX ORDER — ONDANSETRON 4 MG/1
4 TABLET, FILM COATED ORAL EVERY 6 HOURS PRN
Status: DISCONTINUED | OUTPATIENT
Start: 2019-12-20 | End: 2019-12-17 | Stop reason: HOSPADM

## 2019-12-16 RX ORDER — MEPERIDINE HYDROCHLORIDE 25 MG/ML
12.5 INJECTION INTRAMUSCULAR; INTRAVENOUS; SUBCUTANEOUS
Status: DISCONTINUED | OUTPATIENT
Start: 2019-12-16 | End: 2019-12-16 | Stop reason: HOSPADM

## 2019-12-16 RX ORDER — ACETAMINOPHEN 500 MG
1000 TABLET ORAL ONCE
Status: COMPLETED | OUTPATIENT
Start: 2019-12-16 | End: 2019-12-16

## 2019-12-16 RX ORDER — NALOXONE HCL 0.4 MG/ML
0.1 VIAL (ML) INJECTION
Status: DISCONTINUED | OUTPATIENT
Start: 2019-12-16 | End: 2019-12-17 | Stop reason: HOSPADM

## 2019-12-16 RX ORDER — CEFAZOLIN SODIUM 2 G/100ML
2 INJECTION, SOLUTION INTRAVENOUS ONCE
Status: COMPLETED | OUTPATIENT
Start: 2019-12-16 | End: 2019-12-16

## 2019-12-16 RX ORDER — OXYCODONE AND ACETAMINOPHEN 10; 325 MG/1; MG/1
1 TABLET ORAL EVERY 4 HOURS PRN
Status: DISCONTINUED | OUTPATIENT
Start: 2019-12-16 | End: 2019-12-16 | Stop reason: HOSPADM

## 2019-12-16 RX ORDER — GLYCOPYRROLATE 0.2 MG/ML
INJECTION INTRAMUSCULAR; INTRAVENOUS AS NEEDED
Status: DISCONTINUED | OUTPATIENT
Start: 2019-12-16 | End: 2019-12-16 | Stop reason: SURG

## 2019-12-16 RX ORDER — FENTANYL CITRATE 50 UG/ML
50 INJECTION, SOLUTION INTRAMUSCULAR; INTRAVENOUS
Status: DISCONTINUED | OUTPATIENT
Start: 2019-12-16 | End: 2019-12-16 | Stop reason: HOSPADM

## 2019-12-16 RX ORDER — ONDANSETRON 2 MG/ML
INJECTION INTRAMUSCULAR; INTRAVENOUS AS NEEDED
Status: DISCONTINUED | OUTPATIENT
Start: 2019-12-16 | End: 2019-12-16 | Stop reason: SURG

## 2019-12-16 RX ORDER — ACETAMINOPHEN 325 MG/1
650 TABLET ORAL ONCE AS NEEDED
Status: DISCONTINUED | OUTPATIENT
Start: 2019-12-16 | End: 2019-12-16 | Stop reason: HOSPADM

## 2019-12-16 RX ORDER — PROMETHAZINE HYDROCHLORIDE 25 MG/1
25 SUPPOSITORY RECTAL ONCE AS NEEDED
Status: DISCONTINUED | OUTPATIENT
Start: 2019-12-16 | End: 2019-12-16 | Stop reason: HOSPADM

## 2019-12-16 RX ORDER — HYDRALAZINE HYDROCHLORIDE 20 MG/ML
10 INJECTION INTRAMUSCULAR; INTRAVENOUS
Status: DISCONTINUED | OUTPATIENT
Start: 2019-12-16 | End: 2019-12-17 | Stop reason: HOSPADM

## 2019-12-16 RX ORDER — LIDOCAINE HYDROCHLORIDE 10 MG/ML
0.5 INJECTION, SOLUTION EPIDURAL; INFILTRATION; INTRACAUDAL; PERINEURAL ONCE AS NEEDED
Status: COMPLETED | OUTPATIENT
Start: 2019-12-16 | End: 2019-12-16

## 2019-12-16 RX ORDER — DEXAMETHASONE SODIUM PHOSPHATE 10 MG/ML
INJECTION, SOLUTION INTRAMUSCULAR; INTRAVENOUS AS NEEDED
Status: DISCONTINUED | OUTPATIENT
Start: 2019-12-16 | End: 2019-12-16 | Stop reason: SURG

## 2019-12-16 RX ORDER — ONDANSETRON 2 MG/ML
4 INJECTION INTRAMUSCULAR; INTRAVENOUS EVERY 4 HOURS PRN
Status: DISCONTINUED | OUTPATIENT
Start: 2019-12-16 | End: 2019-12-17 | Stop reason: HOSPADM

## 2019-12-16 RX ORDER — DIPHENHYDRAMINE HYDROCHLORIDE 50 MG/ML
25 INJECTION INTRAMUSCULAR; INTRAVENOUS EVERY 4 HOURS PRN
Status: DISCONTINUED | OUTPATIENT
Start: 2019-12-16 | End: 2019-12-17 | Stop reason: HOSPADM

## 2019-12-16 RX ORDER — LORAZEPAM 2 MG/ML
0.5 INJECTION INTRAMUSCULAR EVERY 12 HOURS PRN
Status: DISCONTINUED | OUTPATIENT
Start: 2019-12-16 | End: 2019-12-17 | Stop reason: HOSPADM

## 2019-12-16 RX ORDER — SODIUM CHLORIDE 0.9 % (FLUSH) 0.9 %
3 SYRINGE (ML) INJECTION EVERY 12 HOURS SCHEDULED
Status: DISCONTINUED | OUTPATIENT
Start: 2019-12-16 | End: 2019-12-16

## 2019-12-16 RX ORDER — ROCURONIUM BROMIDE 10 MG/ML
INJECTION, SOLUTION INTRAVENOUS AS NEEDED
Status: DISCONTINUED | OUTPATIENT
Start: 2019-12-16 | End: 2019-12-16 | Stop reason: SURG

## 2019-12-16 RX ORDER — SODIUM CHLORIDE, SODIUM LACTATE, POTASSIUM CHLORIDE, CALCIUM CHLORIDE 600; 310; 30; 20 MG/100ML; MG/100ML; MG/100ML; MG/100ML
150 INJECTION, SOLUTION INTRAVENOUS CONTINUOUS
Status: DISCONTINUED | OUTPATIENT
Start: 2019-12-16 | End: 2019-12-16 | Stop reason: HOSPADM

## 2019-12-16 RX ORDER — SODIUM CHLORIDE, SODIUM LACTATE, POTASSIUM CHLORIDE, CALCIUM CHLORIDE 600; 310; 30; 20 MG/100ML; MG/100ML; MG/100ML; MG/100ML
9 INJECTION, SOLUTION INTRAVENOUS CONTINUOUS
Status: DISCONTINUED | OUTPATIENT
Start: 2019-12-16 | End: 2019-12-16

## 2019-12-16 RX ORDER — FAMOTIDINE 20 MG/1
20 TABLET, FILM COATED ORAL ONCE
Status: DISCONTINUED | OUTPATIENT
Start: 2019-12-16 | End: 2019-12-16

## 2019-12-16 RX ADMIN — MORPHINE SULFATE 4 MG: 4 INJECTION, SOLUTION INTRAMUSCULAR; INTRAVENOUS at 21:08

## 2019-12-16 RX ADMIN — ACETAMINOPHEN 1000 MG: 500 TABLET, FILM COATED ORAL at 21:23

## 2019-12-16 RX ADMIN — DEXAMETHASONE SODIUM PHOSPHATE 4 MG: 10 INJECTION INTRAMUSCULAR; INTRAVENOUS at 07:45

## 2019-12-16 RX ADMIN — ZOLPIDEM TARTRATE 10 MG: 5 TABLET ORAL at 23:24

## 2019-12-16 RX ADMIN — MORPHINE SULFATE 4 MG: 4 INJECTION, SOLUTION INTRAMUSCULAR; INTRAVENOUS at 12:50

## 2019-12-16 RX ADMIN — HYDROMORPHONE HYDROCHLORIDE 2 MG: 2 TABLET ORAL at 14:08

## 2019-12-16 RX ADMIN — ROCURONIUM BROMIDE 50 MG: 10 INJECTION INTRAVENOUS at 07:38

## 2019-12-16 RX ADMIN — PROPOFOL 200 MG: 10 INJECTION, EMULSION INTRAVENOUS at 07:38

## 2019-12-16 RX ADMIN — HYDROMORPHONE HYDROCHLORIDE 0.5 MG: 1 INJECTION, SOLUTION INTRAMUSCULAR; INTRAVENOUS; SUBCUTANEOUS at 11:30

## 2019-12-16 RX ADMIN — POTASSIUM CHLORIDE AND SODIUM CHLORIDE 125 ML/HR: 450; 150 INJECTION, SOLUTION INTRAVENOUS at 21:12

## 2019-12-16 RX ADMIN — CHLORHEXIDINE GLUCONATE 0.12% ORAL RINSE 30 ML: 1.2 LIQUID ORAL at 06:58

## 2019-12-16 RX ADMIN — PROPOFOL 25 MCG/KG/MIN: 10 INJECTION, EMULSION INTRAVENOUS at 07:50

## 2019-12-16 RX ADMIN — SODIUM CHLORIDE, POTASSIUM CHLORIDE, SODIUM LACTATE AND CALCIUM CHLORIDE 1000 ML: 600; 310; 30; 20 INJECTION, SOLUTION INTRAVENOUS at 06:41

## 2019-12-16 RX ADMIN — POTASSIUM CHLORIDE AND SODIUM CHLORIDE 125 ML/HR: 450; 150 INJECTION, SOLUTION INTRAVENOUS at 12:53

## 2019-12-16 RX ADMIN — ONDANSETRON 4 MG: 2 INJECTION INTRAMUSCULAR; INTRAVENOUS at 09:55

## 2019-12-16 RX ADMIN — FENTANYL CITRATE 50 MCG: 0.05 INJECTION, SOLUTION INTRAMUSCULAR; INTRAVENOUS at 10:45

## 2019-12-16 RX ADMIN — HYDROMORPHONE HYDROCHLORIDE 0.5 MG: 1 INJECTION, SOLUTION INTRAMUSCULAR; INTRAVENOUS; SUBCUTANEOUS at 11:20

## 2019-12-16 RX ADMIN — SCOPALAMINE 1 PATCH: 1 PATCH, EXTENDED RELEASE TRANSDERMAL at 06:58

## 2019-12-16 RX ADMIN — HYDROCODONE BITARTRATE AND ACETAMINOPHEN 1 TABLET: 7.5; 325 TABLET ORAL at 23:24

## 2019-12-16 RX ADMIN — BUPIVACAINE HYDROCHLORIDE 60 ML: 2.5 INJECTION, SOLUTION EPIDURAL; INFILTRATION; INTRACAUDAL; PERINEURAL at 07:45

## 2019-12-16 RX ADMIN — BUPRENORPHINE HYDROCHLORIDE 0.3 MG: 0.32 INJECTION INTRAMUSCULAR; INTRAVENOUS at 07:45

## 2019-12-16 RX ADMIN — LIDOCAINE HYDROCHLORIDE 0.5 ML: 10 INJECTION, SOLUTION EPIDURAL; INFILTRATION; INTRACAUDAL; PERINEURAL at 06:41

## 2019-12-16 RX ADMIN — NEOSTIGMINE METHYLSULFATE 3 MG: 1 INJECTION, SOLUTION INTRAVENOUS at 10:35

## 2019-12-16 RX ADMIN — ACETAMINOPHEN 1000 MG: 500 TABLET ORAL at 06:58

## 2019-12-16 RX ADMIN — DEXAMETHASONE SODIUM PHOSPHATE 8 MG: 10 INJECTION INTRAMUSCULAR; INTRAVENOUS at 10:35

## 2019-12-16 RX ADMIN — FENTANYL CITRATE 50 MCG: 50 INJECTION, SOLUTION INTRAMUSCULAR; INTRAVENOUS at 07:38

## 2019-12-16 RX ADMIN — HYDROCODONE BITARTRATE AND ACETAMINOPHEN 1 TABLET: 7.5; 325 TABLET ORAL at 15:28

## 2019-12-16 RX ADMIN — FENTANYL CITRATE 50 MCG: 0.05 INJECTION, SOLUTION INTRAMUSCULAR; INTRAVENOUS at 11:10

## 2019-12-16 RX ADMIN — SODIUM CHLORIDE, POTASSIUM CHLORIDE, SODIUM LACTATE AND CALCIUM CHLORIDE: 600; 310; 30; 20 INJECTION, SOLUTION INTRAVENOUS at 10:40

## 2019-12-16 RX ADMIN — HYDROMORPHONE HYDROCHLORIDE 0.5 MG: 1 INJECTION, SOLUTION INTRAMUSCULAR; INTRAVENOUS; SUBCUTANEOUS at 11:00

## 2019-12-16 RX ADMIN — FENTANYL CITRATE 50 MCG: 0.05 INJECTION, SOLUTION INTRAMUSCULAR; INTRAVENOUS at 11:15

## 2019-12-16 RX ADMIN — GLYCOPYRROLATE 0.4 MG: 0.2 INJECTION, SOLUTION INTRAMUSCULAR; INTRAVENOUS at 10:35

## 2019-12-16 RX ADMIN — MORPHINE SULFATE 4 MG: 4 INJECTION, SOLUTION INTRAMUSCULAR; INTRAVENOUS at 17:39

## 2019-12-16 RX ADMIN — CEFAZOLIN SODIUM 2 G: 10 INJECTION, POWDER, FOR SOLUTION INTRAVENOUS at 15:31

## 2019-12-16 RX ADMIN — GABAPENTIN 600 MG: 300 CAPSULE ORAL at 06:58

## 2019-12-16 RX ADMIN — CEFAZOLIN SODIUM 2 G: 2 INJECTION, SOLUTION INTRAVENOUS at 07:34

## 2019-12-16 RX ADMIN — CEFAZOLIN 1 G: 1 INJECTION, POWDER, FOR SOLUTION INTRAVENOUS at 10:35

## 2019-12-16 RX ADMIN — FENTANYL CITRATE 50 MCG: 0.05 INJECTION, SOLUTION INTRAMUSCULAR; INTRAVENOUS at 10:50

## 2019-12-16 RX ADMIN — HYDROMORPHONE HYDROCHLORIDE 0.5 MG: 1 INJECTION, SOLUTION INTRAMUSCULAR; INTRAVENOUS; SUBCUTANEOUS at 11:25

## 2019-12-16 RX ADMIN — FENTANYL CITRATE 50 MCG: 50 INJECTION, SOLUTION INTRAMUSCULAR; INTRAVENOUS at 10:35

## 2019-12-16 RX ADMIN — HYDROMORPHONE HYDROCHLORIDE 0.5 MG: 1 INJECTION, SOLUTION INTRAMUSCULAR; INTRAVENOUS; SUBCUTANEOUS at 11:35

## 2019-12-16 RX ADMIN — LIDOCAINE HYDROCHLORIDE 50 MG: 10 INJECTION, SOLUTION EPIDURAL; INFILTRATION; INTRACAUDAL; PERINEURAL at 07:38

## 2019-12-16 RX ADMIN — CEFAZOLIN SODIUM 2 G: 10 INJECTION, POWDER, FOR SOLUTION INTRAVENOUS at 23:24

## 2019-12-16 RX ADMIN — HYDROCODONE BITARTRATE AND ACETAMINOPHEN 1 TABLET: 7.5; 325 TABLET ORAL at 19:31

## 2019-12-16 NOTE — ANESTHESIA PROCEDURE NOTES
Airway  Urgency: elective    Date/Time: 12/16/2019 7:38 AM  End Time:12/16/2019 7:42 AM  Airway not difficult    General Information and Staff    Patient location during procedure: OR  CRNA: Jason Kennedy CRNA    Indications and Patient Condition  Indications for airway management: airway protection    Preoxygenated: yes  MILS not maintained throughout  Mask difficulty assessment: 1 - vent by mask    Final Airway Details  Final airway type: endotracheal airway      Successful airway: ETT  Cuffed: yes   Successful intubation technique: direct laryngoscopy  Endotracheal tube insertion site: oral  Blade: Mirtha  Blade size: 3  ETT size (mm): 7.5  Cormack-Lehane Classification: grade I - full view of glottis  Placement verified by: chest auscultation and capnometry   Measured from: lips  ETT/EBT  to lips (cm): 23  Number of attempts at approach: 1    Additional Comments  Negative epigastric sounds, Breath sound equal bilaterally with symmetric chest rise and fall

## 2019-12-16 NOTE — ANESTHESIA PREPROCEDURE EVALUATION
Anesthesia Evaluation     Patient summary reviewed and Nursing notes reviewed   no history of anesthetic complications:  NPO Solid Status: > 8 hours  NPO Liquid Status: > 8 hours           Airway   Mallampati: II  TM distance: >3 FB  Neck ROM: full  No difficulty expected  Dental      Pulmonary - normal exam   (+) sleep apnea,   Cardiovascular - normal exam        Neuro/Psych  (+) psychiatric history,     GI/Hepatic/Renal/Endo    (+) morbid obesity, GERD,  liver disease,     Musculoskeletal     Abdominal    Substance History      OB/GYN          Other                        Anesthesia Plan    ASA 3     general   (Taps)  intravenous induction     Anesthetic plan, all risks, benefits, and alternatives have been provided, discussed and informed consent has been obtained with: patient.    Plan discussed with CRNA.

## 2019-12-16 NOTE — BRIEF OP NOTE
PARAESOPHAGEAL HERNIA REPAIR LAPAROSCOPIC, NISSEN FUNDOPLICATION LAPAROSCOPIC  Progress Note    Sergio Lucero  12/16/2019    Pre-op Diagnosis:   Paraesophageal hiatal hernia [K44.9]       Post-Op Diagnosis Codes:     * Paraesophageal hiatal hernia [K44.9]    Procedure/CPT® Codes:  NV LAP, REPAIR PARAESOPHAGEAL HERNIA, INCL FUNDOPLASTY W/O MESH [06671]    Procedure(s):  PARAESOPHAGEAL HERNIA REPAIR LAPAROSCOPIC with mesh  NISSEN FUNDOPLICATION LAPAROSCOPIC    Surgeon(s):  Sky Angel MD    Anesthesia: General    Staff:   Circulator: Primitivo Zambrano RN  Scrub Person: Angie Eaton Zsane  Nursing Assistant: Mckenna Kapadia    Estimated Blood Loss: 300 mL    Urine Voided: * No values recorded between 12/16/2019  7:34 AM and 12/16/2019 10:30 AM *    Specimens:                Specimens     ID Source Type Tests Collected By Collected At Frozen?      A Hernia, Sac Tissue · TISSUE PATHOLOGY EXAM   Sky Angel MD 12/16/19 0851 No     Description: HIATAL HERNIA SAC    This specimen was not marked as sent.                Drains:   Closed/Suction Drain 1 RUQ (Active)       Findings: intrathoracic stomach    Complications: none      Sky Angel MD     Date: 12/16/2019  Time: 10:30 AM

## 2019-12-16 NOTE — OP NOTE
Preoperative Diagnosis:                                                            Type IV paraesophageal hiatal hernia with intrathoracic stomach     Postoperative Diagnosis:                                                              Same     Procedure:                                                     Laparoscopic reduction and repair of large type IV paraesophageal hiatal hernia with 7x10 cm Phasix mesh, laparoscopic Nissen fundoplication      Surgeon:                                                       MARILIA Angel MD     Anesthesia:                                                   GETA     EBL:                                                              400 cc     Fluids:                                                           Crystalloid     Specimens:                                                   None     Drains:                                                           None     Counts:                                                          Correct     Complications:                                               None     Indications:   This is a 34-year-old morbidly obese white male who presents for elective laparoscopic repair of his large type IV paraesophageal hiatal hernia and intrathoracic stomach possibly with mesh and concomitant Nissen fundoplication.  R/B/A Rx were discussed and he wishes to proceed.     Operative technique:      The patient was brought to the operating room, and placed supine upon the operating room table.  SCD hose were placed, he underwent uneventful general endotracheal anesthesia per the anesthesiology staff, the anesthesiology staff performed a tap block, he received IV Ancef and subcutaneous lovenox, the anesthesiology staff performed a tap block, and his abdomen was prepped and draped with ChloraPrep in a sterile fashion, a clear plastic drape was used as well, a García catheter was not placed.     The peritoneal cavity was entered in the upper abdomen to  the left of midline using an 11 mm trocar and an Optiview technique and the abdomen was insufflated to a pressure of 15 mmHg with CO2 gas.  Exploratory laparoscopy revealed no evidence of injury from the entrance technique, a normal appearing liver, some omental adhesions to an otherwise normal-appearing gallbladder, a large amount of intra-abdominal fat and very large fatty falciform ligament.  Remaining fascial splitting trocars were placed under direct viz including an 11 mm in the left mid abdomen, 5 mm trocars in the RUQ and left subcostal positions.  Later in the case for retraction to close the hiatus and additional 5 mm trocar was placed under direct visualization in the right midabdomen.     Through a stab incision in the epigastrium a Adam retractor was used to elevate the left lobe of the liver exposing the hiatus and a very large widemouth hiatal opening with an intrathoracic stomach and a lot of associated omentum.  Th the contents could be retracted but immediately retracted back up into the chest.  The entire splenic fat pad and a lot of the transverse colonic omentum had herniated up in the chest laterally.  I approached the hiatus from the right.  The pars flaccida was divided initially trying to preserve the appendage branch of the vagus nerve, there was not a replaced hepatic vessel.  Later in the case hepatic branch of the vagus nerves were sacrificed.  The hernia sac was incised along the base of the right lake and this was extended up and across the phrenoesophageal membrane.  The hernia sac was dissected bluntly from the right side out of the mediastinum exposing the aorta along its length.  The omentum was reduced out of the chest and retracted, the stomach was reduced out of the chest and retracted exposing the base of the left lake.  The hernia sac was incised along the base of the left lake and extended up and across the phrenoesophageal membrane.  Blunt dissection again used to  dissect the left-sided hernia sac out of the mediastinum until the entire hernia sac and its contents had been completely reduced.  Quite some time was then spent amputating the majority of the hernia sac and retrieving it somewhat piecemeal through the camera site 11 mm trocar without widening the fascia.  Although there was no uncontrolled bleeding during the case, there was quite a bit of oozing and later noted was a clotted area on the inferior midportion of the medial spleen from an omental retraction tear.  This was treated with FloSeal and a Ray-Kike but was already hemostatic at that time.  There was almost 400 cc in the suction canister at the end of the procedure.  The GE junction was then lengthened to well below the level of the crura by dissecting areolar tissue well into the mediastinum and into the proximal chest.  The GE junction now rested a good 3 or more centimeters below the hiatus without tension.  The anterior and posterior vagus nerves were preserved.  The crura were dissected to the meeting point inferiorly.  There was a large defect.  The fascia was preserved on both crura.  The hiatal hernia repair was performed posteriorly with 5 interrupted 0 silk suture with good result.  Tissues came together nicely without tension.  The crura had good turgor, they were not attenuated.  The posterior repair was reinforced with a 7 x 10 cm Phasix mesh with a U-hole cut for the esophagus.  The mesh was placed in the appropriate orientation with a smooth shiny side facing the peritoneum and the rough side facing the crura and was anchored to the right and left lake as well as the midline repaired with several interrupted 2-0 silk interrupted sutures.  The paraesophageal hiatal hernia repair was photo documented after primary repair and given after mesh placement.     The fundus was then passed behind the esophagus until it rested on the right of the esophagus without tension and didn't slip back to the  left.  Photodocumentation was obtained.  The 360 degree floppy Nissen fundoplication was then performed using three 2-0 silk interrupted suture.  After placement of the first suture, I elevated it showing a good cm or more elevation above the esophagus (loose/floppy).  The inferior most 3rd suture included a bite of the anterior esophageal wall at the GE junction and away from the anterior vagus nerve.  Upon completion, the Nissen fundoplication rested nicely and photodocumentation was obtained.    Hemostasis had been excellent for quite some time and no further suctioning required.  The Adam retractor was removed. All trocars were removed under direct visualization, no bleeding noted from their sites.   Skin in each incision was closed with 3-0 monocryl plus suture in an interrupted subcuticular stitch followed by skin-a-fix.  The patient tolerated the procedure well without complication, was taken to the recovery room in stable condition.

## 2019-12-16 NOTE — PLAN OF CARE
Problem: Patient Care Overview  Goal: Plan of Care Review  Outcome: Ongoing (interventions implemented as appropriate)  Flowsheets (Taken 12/16/2019 5099)  Progress: improving  Plan of Care Reviewed With: patient; spouse  Outcome Summary: DAX Has chronic back pain. States Baseline is at 7. Wife is at bedside. Patient is doing I/S, chewing gum, and I will encourage ambulation for DVT prophylaxis. Will continue to montior and assess.  Patient request pain medication q30min. As I administer the medication, he requests when can he he more pain medication. He has to be encouraged to ambulate.     Problem: Patient Care Overview  Goal: Individualization and Mutuality  Outcome: Ongoing (interventions implemented as appropriate)     Problem: Patient Care Overview  Goal: Discharge Needs Assessment  Outcome: Ongoing (interventions implemented as appropriate)     Problem: Patient Care Overview  Goal: Interprofessional Rounds/Family Conf  Outcome: Ongoing (interventions implemented as appropriate)     Problem: Pain, Chronic (Adult)  Goal: Identify Related Risk Factors and Signs and Symptoms  Outcome: Ongoing (interventions implemented as appropriate)     Problem: Pain, Chronic (Adult)  Goal: Acceptable Pain/Comfort Level and Functional Ability  Outcome: Ongoing (interventions implemented as appropriate)     Problem: Surgery Nonspecified (Adult)  Goal: Signs and Symptoms of Listed Potential Problems Will be Absent, Minimized or Managed (Surgery Nonspecified)  Outcome: Ongoing (interventions implemented as appropriate)     Problem: Surgery Nonspecified (Adult)  Goal: Anesthesia/Sedation Recovery  Outcome: Ongoing (interventions implemented as appropriate)

## 2019-12-16 NOTE — ANESTHESIA PROCEDURE NOTES
Peripheral Block      Patient reassessed immediately prior to procedure    Patient location during procedure: OR  Start time: 12/16/2019 7:35 AM  Stop time: 12/16/2019 7:41 AM  Reason for block: at surgeon's request and post-op pain management  Performed by  Anesthesiologist: Jensen Marinelli MD  Preanesthetic Checklist  Completed: patient identified, site marked, surgical consent, pre-op evaluation, timeout performed, IV checked, risks and benefits discussed and monitors and equipment checked  Prep:  Pt Position: supine  Sterile barriers:cap, gloves, sterile barriers and mask  Prep: ChloraPrep  Patient monitoring: blood pressure monitoring, continuous pulse oximetry and EKG  Procedure  Sedation:yes  Performed under: general  Guidance:ultrasound guided  Images:still images obtained, printed/placed on chart    Laterality:Bilateral  Block Type:TAP  Injection Technique:single-shot  Needle Type:short-bevel and echogenic  Needle Gauge:20 G  Resistance on Injection: none    Medications Used: buprenorphine (BUPRENEX) injection, 0.3 mg  dexamethasone sodium phosphate injection, 4 mg  bupivacaine PF (MARCAINE) 0.25 % injection, 60 mL      Medications  Comment:Block Injection:  LA dose divided between Right and Left block        Post Assessment  Injection Assessment: negative aspiration for heme, incremental injection and no paresthesia on injection  Patient Tolerance:comfortable throughout block  Complications:no  Additional Notes      Under Ultrasound guidance, a BBraun 4inch 360 degree needle was advanced with Normal Saline hydro dissection of tissue.  The Internal Oblique and Transversus Abdominus muscles where visualized.  At or before the aponeurosis of Internal Oblique, local anesthetic spread was visualized in the Transversus Abdominus Plane. Injection was made incrementally with aspiration every 5 mls.  There was no  intravascular injection,  injection pressure was normal, there was no neural injection, and the  procedure was completed without difficulty.  Thank You.

## 2019-12-16 NOTE — NURSING NOTE
Patient was received from PACU under  another nurses care. Patient fired first nurse after she encouraged I/S and ambulation.     Patient is frequently encouraged to do I/S, chew gum, and ambulation. Patient refuses ambulation at this point and calls out q30 min for pain medication. Before/during administration of the his pain medication he request when he can have more pain medication. Patient states he has a chronic baseline of 7. Wife at bedside but also request pain medication for the patient.

## 2019-12-17 VITALS
HEIGHT: 75 IN | WEIGHT: 301 LBS | TEMPERATURE: 98.5 F | HEART RATE: 86 BPM | OXYGEN SATURATION: 95 % | SYSTOLIC BLOOD PRESSURE: 138 MMHG | DIASTOLIC BLOOD PRESSURE: 80 MMHG | RESPIRATION RATE: 16 BRPM | BODY MASS INDEX: 37.42 KG/M2

## 2019-12-17 LAB
ALBUMIN SERPL-MCNC: 3.8 G/DL (ref 3.5–5.2)
ALBUMIN/GLOB SERPL: 1.4 G/DL
ALP SERPL-CCNC: 50 U/L (ref 39–117)
ALT SERPL W P-5'-P-CCNC: 16 U/L (ref 1–41)
ANION GAP SERPL CALCULATED.3IONS-SCNC: 11 MMOL/L (ref 5–15)
AST SERPL-CCNC: 20 U/L (ref 1–40)
BASOPHILS # BLD AUTO: 0.01 10*3/MM3 (ref 0–0.2)
BASOPHILS NFR BLD AUTO: 0.1 % (ref 0–1.5)
BILIRUB SERPL-MCNC: 0.3 MG/DL (ref 0.2–1.2)
BUN BLD-MCNC: 11 MG/DL (ref 6–20)
BUN/CREAT SERPL: 12.6 (ref 7–25)
CALCIUM SPEC-SCNC: 9.1 MG/DL (ref 8.6–10.5)
CHLORIDE SERPL-SCNC: 101 MMOL/L (ref 98–107)
CO2 SERPL-SCNC: 26 MMOL/L (ref 22–29)
CREAT BLD-MCNC: 0.87 MG/DL (ref 0.76–1.27)
CYTO UR: NORMAL
DEPRECATED RDW RBC AUTO: 44 FL (ref 37–54)
EOSINOPHIL # BLD AUTO: 0 10*3/MM3 (ref 0–0.4)
EOSINOPHIL NFR BLD AUTO: 0 % (ref 0.3–6.2)
ERYTHROCYTE [DISTWIDTH] IN BLOOD BY AUTOMATED COUNT: 14.5 % (ref 12.3–15.4)
GFR SERPL CREATININE-BSD FRML MDRD: 99 ML/MIN/1.73
GLOBULIN UR ELPH-MCNC: 2.7 GM/DL
GLUCOSE BLD-MCNC: 111 MG/DL (ref 65–99)
HCT VFR BLD AUTO: 28.5 % (ref 37.5–51)
HCT VFR BLD AUTO: 30.9 % (ref 37.5–51)
HGB BLD-MCNC: 8.7 G/DL (ref 13–17.7)
HGB BLD-MCNC: 9 G/DL (ref 13–17.7)
IMM GRANULOCYTES # BLD AUTO: 0.04 10*3/MM3 (ref 0–0.05)
IMM GRANULOCYTES NFR BLD AUTO: 0.4 % (ref 0–0.5)
IRON 24H UR-MRATE: 18 MCG/DL (ref 59–158)
LAB AP CASE REPORT: NORMAL
LAB AP CLINICAL INFORMATION: NORMAL
LYMPHOCYTES # BLD AUTO: 0.92 10*3/MM3 (ref 0.7–3.1)
LYMPHOCYTES NFR BLD AUTO: 9.5 % (ref 19.6–45.3)
MCH RBC QN AUTO: 25.6 PG (ref 26.6–33)
MCHC RBC AUTO-ENTMCNC: 30.5 G/DL (ref 31.5–35.7)
MCV RBC AUTO: 83.8 FL (ref 79–97)
MONOCYTES # BLD AUTO: 1.14 10*3/MM3 (ref 0.1–0.9)
MONOCYTES NFR BLD AUTO: 11.7 % (ref 5–12)
NEUTROPHILS # BLD AUTO: 7.6 10*3/MM3 (ref 1.7–7)
NEUTROPHILS NFR BLD AUTO: 78.3 % (ref 42.7–76)
NRBC BLD AUTO-RTO: 0 /100 WBC (ref 0–0.2)
PATH REPORT.FINAL DX SPEC: NORMAL
PATH REPORT.GROSS SPEC: NORMAL
PLATELET # BLD AUTO: 368 10*3/MM3 (ref 140–450)
PMV BLD AUTO: 10.6 FL (ref 6–12)
POTASSIUM BLD-SCNC: 4.6 MMOL/L (ref 3.5–5.2)
PROT SERPL-MCNC: 6.5 G/DL (ref 6–8.5)
RBC # BLD AUTO: 3.4 10*6/MM3 (ref 4.14–5.8)
SODIUM BLD-SCNC: 138 MMOL/L (ref 136–145)
WBC NRBC COR # BLD: 9.71 10*3/MM3 (ref 3.4–10.8)

## 2019-12-17 PROCEDURE — 85025 COMPLETE CBC W/AUTO DIFF WBC: CPT | Performed by: SURGERY

## 2019-12-17 PROCEDURE — 63710000001 BUPROPION XL 150 MG TABLET SUSTAINED-RELEASE 24 HOUR: Performed by: SURGERY

## 2019-12-17 PROCEDURE — 83540 ASSAY OF IRON: CPT | Performed by: SURGERY

## 2019-12-17 PROCEDURE — A9270 NON-COVERED ITEM OR SERVICE: HCPCS | Performed by: SURGERY

## 2019-12-17 PROCEDURE — 25010000002 NA FERRIC GLUC CPLX PER 12.5 MG: Performed by: SURGERY

## 2019-12-17 PROCEDURE — 94660 CPAP INITIATION&MGMT: CPT

## 2019-12-17 PROCEDURE — 85014 HEMATOCRIT: CPT | Performed by: SURGERY

## 2019-12-17 PROCEDURE — 80053 COMPREHEN METABOLIC PANEL: CPT | Performed by: SURGERY

## 2019-12-17 PROCEDURE — 63710000001 ACETAMINOPHEN 500 MG TABLET: Performed by: SURGERY

## 2019-12-17 PROCEDURE — 25010000002 ENOXAPARIN PER 10 MG: Performed by: SURGERY

## 2019-12-17 PROCEDURE — 63710000001 HYDROCODONE-ACETAMINOPHEN 7.5-325 MG TABLET: Performed by: SURGERY

## 2019-12-17 PROCEDURE — 85018 HEMOGLOBIN: CPT | Performed by: SURGERY

## 2019-12-17 PROCEDURE — 99024 POSTOP FOLLOW-UP VISIT: CPT | Performed by: SURGERY

## 2019-12-17 PROCEDURE — 94799 UNLISTED PULMONARY SVC/PX: CPT

## 2019-12-17 PROCEDURE — 25010000002 MORPHINE PER 10 MG: Performed by: SURGERY

## 2019-12-17 PROCEDURE — G0378 HOSPITAL OBSERVATION PER HR: HCPCS

## 2019-12-17 RX ORDER — HYDROCODONE BITARTRATE AND ACETAMINOPHEN 10; 325 MG/1; MG/1
2 TABLET ORAL EVERY 6 HOURS PRN
Qty: 20 TABLET | Refills: 0 | Status: SHIPPED | OUTPATIENT
Start: 2019-12-17 | End: 2019-12-18

## 2019-12-17 RX ADMIN — ENOXAPARIN SODIUM 40 MG: 40 INJECTION SUBCUTANEOUS at 08:08

## 2019-12-17 RX ADMIN — MORPHINE SULFATE 4 MG: 4 INJECTION, SOLUTION INTRAMUSCULAR; INTRAVENOUS at 12:32

## 2019-12-17 RX ADMIN — MORPHINE SULFATE 4 MG: 4 INJECTION, SOLUTION INTRAMUSCULAR; INTRAVENOUS at 08:08

## 2019-12-17 RX ADMIN — MORPHINE SULFATE 4 MG: 4 INJECTION, SOLUTION INTRAMUSCULAR; INTRAVENOUS at 16:48

## 2019-12-17 RX ADMIN — ACETAMINOPHEN 1000 MG: 500 TABLET, FILM COATED ORAL at 08:08

## 2019-12-17 RX ADMIN — HYDROCODONE BITARTRATE AND ACETAMINOPHEN 1 TABLET: 7.5; 325 TABLET ORAL at 07:04

## 2019-12-17 RX ADMIN — SODIUM CHLORIDE 125 MG: 9 INJECTION, SOLUTION INTRAVENOUS at 14:36

## 2019-12-17 RX ADMIN — MORPHINE SULFATE 4 MG: 4 INJECTION, SOLUTION INTRAMUSCULAR; INTRAVENOUS at 10:06

## 2019-12-17 RX ADMIN — BUPROPION HYDROCHLORIDE 300 MG: 150 TABLET, FILM COATED, EXTENDED RELEASE ORAL at 06:17

## 2019-12-17 RX ADMIN — PANTOPRAZOLE SODIUM 40 MG: 40 INJECTION, POWDER, FOR SOLUTION INTRAVENOUS at 05:17

## 2019-12-17 RX ADMIN — POTASSIUM CHLORIDE AND SODIUM CHLORIDE 125 ML/HR: 450; 150 INJECTION, SOLUTION INTRAVENOUS at 14:36

## 2019-12-17 RX ADMIN — MORPHINE SULFATE 4 MG: 4 INJECTION, SOLUTION INTRAMUSCULAR; INTRAVENOUS at 14:37

## 2019-12-17 RX ADMIN — ENOXAPARIN SODIUM 40 MG: 40 INJECTION SUBCUTANEOUS at 10:06

## 2019-12-17 RX ADMIN — MORPHINE SULFATE 4 MG: 4 INJECTION, SOLUTION INTRAMUSCULAR; INTRAVENOUS at 05:17

## 2019-12-17 RX ADMIN — HYDROCODONE BITARTRATE AND ACETAMINOPHEN 1 TABLET: 7.5; 325 TABLET ORAL at 03:11

## 2019-12-17 RX ADMIN — HYDROCODONE BITARTRATE AND ACETAMINOPHEN 1 TABLET: 7.5; 325 TABLET ORAL at 11:32

## 2019-12-17 RX ADMIN — HYDROCODONE BITARTRATE AND ACETAMINOPHEN 1 TABLET: 7.5; 325 TABLET ORAL at 15:42

## 2019-12-17 RX ADMIN — MORPHINE SULFATE 4 MG: 4 INJECTION, SOLUTION INTRAMUSCULAR; INTRAVENOUS at 00:52

## 2019-12-17 NOTE — PLAN OF CARE
"  Problem: Patient Care Overview  Goal: Plan of Care Review  Outcome: Ongoing (interventions implemented as appropriate)  Flowsheets (Taken 12/17/2019 0222)  Progress: no change  Plan of Care Reviewed With: patient  Outcome Summary: VSS. Pt frequently requesting pain meds for abd discomfort. PRNs provided. Pt also complained of numbness in his first 3 fingers on his LUE, stating he first noticed the numbness \"a little while after surgery.\" Neuro assessment completed and the only abnormality noted was decreased  strength in LUE. PA was paged twice via answering service with no response. Pt is currently resting comfortably in room. Will continue to monitor.     "

## 2019-12-17 NOTE — PROGRESS NOTES
Discharge Planning Assessment  [facility]     Patient Name: Sergio Lucero  MRN: [id number]  Today's Date: 12/17/2019    Admit Date: 12/16/2019    Discharge Needs Assessment     Row Name 12/17/19 1148       Living Environment    Lives With  child(kamryn), dependent;spouse    Name(s) of Who Lives With Patient  Layne    Current Living Arrangements  home/apartment/condo    Primary Care Provided by  self    Provides Primary Care For  no one    Family Caregiver if Needed  spouse    Family Caregiver Names  Layne    Quality of Family Relationships  supportive    Able to Return to Prior Arrangements  yes    Living Arrangement Comments  Lives in [place] with his spouse Layne       Resource/Environmental Concerns    Resource/Environmental Concerns  none    Transportation Concerns  car, none       Transition Planning    Patient/Family Anticipates Transition to  home with family    Patient/Family Anticipated Services at Transition      Transportation Anticipated  family or friend will provide       Discharge Needs Assessment    Readmission Within the Last 30 Days  no previous admission in last 30 days    Concerns to be Addressed  discharge planning    Equipment Currently Used at Home  bipap/cpap    Anticipated Changes Related to Illness  none    Equipment Needed After Discharge  bipap/cpap        Discharge Plan     Row Name 12/17/19 1469       Plan    Plan  Braxton rao went to visit the patient Sergio Lucero. He lives in [place] with his spouse Layne. His insurance is [employer] Medicaid insurance.  His primary care provider is Noelle Roberts. He uses a bipap machine at home. He goal is to return home at discharge. Case management will continue to follow him for discharge planning needs.    Patient/Family in Agreement with Plan  yes    Final Discharge Disposition Code  01 - home or self-care        Destination      Coordination has not been started for this encounter.      Durable Medical Equipment       Coordination has not been started for this encounter.      Dialysis/Infusion      Coordination has not been started for this encounter.      Home Medical Care      Coordination has not been started for this encounter.      Therapy      Coordination has not been started for this encounter.      Community Resources      Coordination has not been started for this encounter.          Demographic Summary     Row Name 12/17/19 1148       General Information    Admission Type  observation    Arrived From  home    Referral Source  patient    Reason for Consult  discharge planning    Preferred Language  English       Contact Information    Permission Granted to Share Info With      Contact Information Obtained for          Functional Status     Row Name 12/17/19 1148       Functional Status    Usual Activity Tolerance  moderate    Current Activity Tolerance  good       Functional Status, IADL    Medications  independent    Meal Preparation  independent    Housekeeping  independent    Laundry  independent    Shopping  independent       Mental Status Summary    Recent Changes in Mental Status/Cognitive Functioning  no changes        Psychosocial    No documentation.       Abuse/Neglect    No documentation.       Legal    No documentation.       Substance Abuse    No documentation.       Patient Forms    No documentation.           CECY Bhakta

## 2019-12-17 NOTE — PLAN OF CARE
Patient pain not controlled by PO pain medication states a larger dose would help, MD notified.  Patient up to ambulated with no distress, drinking large amounts of juice with no nausea

## 2019-12-17 NOTE — PROGRESS NOTES
"Bariatric Surgery Progress Note:      Chief Complaint:  POD #1    Subjective     Interval History:  Doing well.  No complaints.  Pain controlled, but per nursing has been asking for IV morphine all day and wants it to be given WITH his oral narcotics.  Denies N/V.  Drank 18 apple juice cups so far today.  No fevers.  Ambulating.  Voiding.  IS 2000.    Objective     Vital Signs  Blood pressure 138/80, pulse 86, temperature 98.5 °F (36.9 °C), temperature source Oral, resp. rate 16, height 190.5 cm (75\"), weight (!) 137 kg (301 lb), SpO2 95 %.      Intake/Output Summary (Last 24 hours) at 12/17/2019 1809  Last data filed at 12/17/2019 1436  Gross per 24 hour   Intake 5996.96 ml   Output --   Net 5996.96 ml       Physical Exam:  General: Alert, NAD, standing up with his IV pole  Lungs: Clear  Heart: RRR  Abdomen: soft, appropriate, incisions okay  Extremities: warm, (+) SCDs       Labs:  Lab Results (last 24 hours)     Procedure Component Value Units Date/Time    Tissue Pathology Exam [[id number]] Collected:  12/16/19 0851    Specimen:  Tissue from Hernia, Sac Updated:  12/17/19 1647     Case Report --     Surgical Pathology Report                         Case: [id number]                                  Authorizing Provider:  Sky Angel MD    Collected:           12/16/2019 08:51 AM          Ordering Location:     [facility]   Received:            12/16/2019 11:27 AM                                 OR                                                                           Pathologist:           Madhu Abrams MD                                                        Specimen:    Hernia, Sac, HIATAL HERNIA SAC                                                              Clinical Information --     The working history is esophageal hernia.       Final Diagnosis --      SPECIMEN SUBMITTED AS \"HIATAL HERNIA SAC:\"  Serosal covered fibroadipose and vascular tissue consistent with hernia " sac.    PCC/dlb       Gross Description --     Received in formalin labeled as hiatal hernia sac consists of a 14.0 x 13.0 x 3.5 cm aggregate of red/pink dusky fibromembranous soft tissue and yellow lobular fat. No areas of necrosis are identified. No definitive lymph nodes are present. Representative sections are submitted in one cassette.  HBM/klb        Microscopic Description --     The slides are reviewed and demonstrate histopathologic features supporting the above rendered diagnosis.          Hemoglobin & Hematocrit, Blood [[id number]]  (Abnormal) Collected:  12/17/19 1422    Specimen:  Blood Updated:  12/17/19 1434     Hemoglobin 9.0 g/dL      Hematocrit 30.9 %     Iron [[id number]]  (Abnormal) Collected:  12/17/19 0509    Specimen:  Blood Updated:  12/17/19 0855     Iron 18 mcg/dL     Comprehensive Metabolic Panel [[id number]]  (Abnormal) Collected:  12/17/19 0509    Specimen:  Blood Updated:  12/17/19 0712     Glucose 111 mg/dL      BUN 11 mg/dL      Creatinine 0.87 mg/dL      Sodium 138 mmol/L      Potassium 4.6 mmol/L      Chloride 101 mmol/L      CO2 26.0 mmol/L      Calcium 9.1 mg/dL      Total Protein 6.5 g/dL      Albumin 3.80 g/dL      ALT (SGPT) 16 U/L      AST (SGOT) 20 U/L      Alkaline Phosphatase 50 U/L      Total Bilirubin 0.3 mg/dL      eGFR Non African Amer 99 mL/min/1.73      Globulin 2.7 gm/dL      A/G Ratio 1.4 g/dL      BUN/Creatinine Ratio 12.6     Anion Gap 11.0 mmol/L     Narrative:       GFR Normal >60  Chronic Kidney Disease <60  Kidney Failure <15      CBC & Differential [[id number]] Collected:  12/17/19 0509    Specimen:  Blood Updated:  12/17/19 0620    Narrative:       The following orders were created for panel order CBC & Differential.  Procedure                               Abnormality         Status                     ---------                               -----------         ------                     CBC Auto Differential[[id number]]        Abnormal            Final result                  Please view results for these tests on the individual orders.    CBC Auto Differential [[id number]]  (Abnormal) Collected:  12/17/19 0509    Specimen:  Blood Updated:  12/17/19 0620     WBC 9.71 10*3/mm3      RBC 3.40 10*6/mm3      Hemoglobin 8.7 g/dL      Hematocrit 28.5 %      MCV 83.8 fL      MCH 25.6 pg      MCHC 30.5 g/dL      RDW 14.5 %      RDW-SD 44.0 fl      MPV 10.6 fL      Platelets 368 10*3/mm3      Neutrophil % 78.3 %      Lymphocyte % 9.5 %      Monocyte % 11.7 %      Eosinophil % 0.0 %      Basophil % 0.1 %      Immature Grans % 0.4 %      Neutrophils, Absolute 7.60 10*3/mm3      Lymphocytes, Absolute 0.92 10*3/mm3      Monocytes, Absolute 1.14 10*3/mm3      Eosinophils, Absolute 0.00 10*3/mm3      Basophils, Absolute 0.01 10*3/mm3      Immature Grans, Absolute 0.04 10*3/mm3      nRBC 0.0 /100 WBC             Assessment/Plan     POD # 1 s/p repair of large type IV paraesophageal hernia with Phasix mesh, Nissen fundoplication.    Anemia noted: recheck Hgb is stable, and he has no tachycardia or hypotension to suggest active bleeding.Patient feels well and has met discharge criteria.  Will discharge home with follow up in 1-2 weeks with PA.  Discussed following post fundoplication diet, and he voiced understanding and said he received the paper from the office.    Rx given for Lortab 10, #20.  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  I advised he taper his narcotics down to his normal dose over the next few days.  He is aware that I will give a one time prescription with #20 Norco 10s, but that refills are not typically given in the office.  If his pain is not controlled after he runs out, he may need additional workup or discussion with pain management.   Discharge instructions reviewed with patient and all questions answered.             12/17/19  6:09 PM  Fern Brody MD

## 2019-12-18 ENCOUNTER — OFFICE VISIT (OUTPATIENT)
Dept: BARIATRICS/WEIGHT MGMT | Facility: CLINIC | Age: 34
End: 2019-12-18

## 2019-12-18 ENCOUNTER — APPOINTMENT (OUTPATIENT)
Dept: CT IMAGING | Facility: HOSPITAL | Age: 34
End: 2019-12-18

## 2019-12-18 ENCOUNTER — APPOINTMENT (OUTPATIENT)
Dept: GENERAL RADIOLOGY | Facility: HOSPITAL | Age: 34
End: 2019-12-18

## 2019-12-18 ENCOUNTER — HOSPITAL ENCOUNTER (OUTPATIENT)
Facility: HOSPITAL | Age: 34
Discharge: HOME | End: 2019-12-21
Attending: EMERGENCY MEDICINE | Admitting: INTERNAL MEDICINE

## 2019-12-18 ENCOUNTER — TELEPHONE (OUTPATIENT)
Dept: BARIATRICS/WEIGHT MGMT | Facility: CLINIC | Age: 34
End: 2019-12-18

## 2019-12-18 VITALS
HEIGHT: 75 IN | DIASTOLIC BLOOD PRESSURE: 72 MMHG | HEART RATE: 106 BPM | TEMPERATURE: 97.2 F | WEIGHT: 300 LBS | RESPIRATION RATE: 18 BRPM | BODY MASS INDEX: 37.3 KG/M2 | SYSTOLIC BLOOD PRESSURE: 122 MMHG | OXYGEN SATURATION: 99 %
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
## 2019-12-18 DIAGNOSIS — J18.9 PNEUMONIA OF LEFT LOWER LOBE DUE TO INFECTIOUS ORGANISM: Primary | ICD-10-CM

## 2019-12-18 DIAGNOSIS — R10.9 POSTOPERATIVE ABDOMINAL PAIN: Primary | ICD-10-CM

## 2019-12-18 DIAGNOSIS — G89.4 CHRONIC PAIN DISORDER: ICD-10-CM

## 2019-12-18 DIAGNOSIS — J18.9 HAP (HOSPITAL-ACQUIRED PNEUMONIA): ICD-10-CM

## 2019-12-18 DIAGNOSIS — G89.18 POSTOPERATIVE ABDOMINAL PAIN: Primary | ICD-10-CM

## 2019-12-18 DIAGNOSIS — G89.18 POST-OPERATIVE PAIN: ICD-10-CM

## 2019-12-18 DIAGNOSIS — K44.9 PARAESOPHAGEAL HIATAL HERNIA: ICD-10-CM

## 2019-12-18 DIAGNOSIS — Z87.19 HISTORY OF HIATAL HERNIA: ICD-10-CM

## 2019-12-18 LAB
ALBUMIN SERPL-MCNC: 3.9 G/DL (ref 3.5–5.2)
ALBUMIN/GLOB SERPL: 1.3 G/DL
ALP SERPL-CCNC: 77 U/L (ref 39–117)
ALT SERPL W P-5'-P-CCNC: 31 U/L (ref 1–41)
ANION GAP SERPL CALCULATED.3IONS-SCNC: 9 MMOL/L (ref 5–15)
AST SERPL-CCNC: 44 U/L (ref 1–40)
BASOPHILS # BLD AUTO: 0.05 10*3/MM3 (ref 0–0.2)
BASOPHILS NFR BLD AUTO: 0.6 % (ref 0–1.5)
BILIRUB SERPL-MCNC: 0.3 MG/DL (ref 0.2–1.2)
BUN BLD-MCNC: 13 MG/DL (ref 6–20)
BUN/CREAT SERPL: 14.9 (ref 7–25)
CALCIUM SPEC-SCNC: 9.1 MG/DL (ref 8.6–10.5)
CHLORIDE SERPL-SCNC: 99 MMOL/L (ref 98–107)
CO2 SERPL-SCNC: 30 MMOL/L (ref 22–29)
CREAT BLD-MCNC: 0.87 MG/DL (ref 0.76–1.27)
D-LACTATE SERPL-SCNC: 1.6 MMOL/L (ref 0.5–2)
DEPRECATED RDW RBC AUTO: 45.2 FL (ref 37–54)
EOSINOPHIL # BLD AUTO: 0.09 10*3/MM3 (ref 0–0.4)
EOSINOPHIL NFR BLD AUTO: 1.1 % (ref 0.3–6.2)
ERYTHROCYTE [DISTWIDTH] IN BLOOD BY AUTOMATED COUNT: 14.7 % (ref 12.3–15.4)
GFR SERPL CREATININE-BSD FRML MDRD: 99 ML/MIN/1.73
GLOBULIN UR ELPH-MCNC: 3 GM/DL
GLUCOSE BLD-MCNC: 103 MG/DL (ref 65–99)
HCT VFR BLD AUTO: 31.1 % (ref 37.5–51)
HGB BLD-MCNC: 8.9 G/DL (ref 13–17.7)
IMM GRANULOCYTES # BLD AUTO: 0.08 10*3/MM3 (ref 0–0.05)
IMM GRANULOCYTES NFR BLD AUTO: 1 % (ref 0–0.5)
LIPASE SERPL-CCNC: 30 U/L (ref 13–60)
LYMPHOCYTES # BLD AUTO: 1.35 10*3/MM3 (ref 0.7–3.1)
LYMPHOCYTES NFR BLD AUTO: 16.7 % (ref 19.6–45.3)
MCH RBC QN AUTO: 24.1 PG (ref 26.6–33)
MCHC RBC AUTO-ENTMCNC: 28.6 G/DL (ref 31.5–35.7)
MCV RBC AUTO: 84.3 FL (ref 79–97)
MONOCYTES # BLD AUTO: 1.25 10*3/MM3 (ref 0.1–0.9)
MONOCYTES NFR BLD AUTO: 15.5 % (ref 5–12)
NEUTROPHILS # BLD AUTO: 5.26 10*3/MM3 (ref 1.7–7)
NEUTROPHILS NFR BLD AUTO: 65.1 % (ref 42.7–76)
NRBC BLD AUTO-RTO: 0 /100 WBC (ref 0–0.2)
PLATELET # BLD AUTO: 358 10*3/MM3 (ref 140–450)
PMV BLD AUTO: 10.6 FL (ref 6–12)
POTASSIUM BLD-SCNC: 4.1 MMOL/L (ref 3.5–5.2)
PROT SERPL-MCNC: 6.9 G/DL (ref 6–8.5)
RBC # BLD AUTO: 3.69 10*6/MM3 (ref 4.14–5.8)
SODIUM BLD-SCNC: 138 MMOL/L (ref 136–145)
WBC NRBC COR # BLD: 8.08 10*3/MM3 (ref 3.4–10.8)

## 2019-12-18 PROCEDURE — G0378 HOSPITAL OBSERVATION PER HR: HCPCS

## 2019-12-18 PROCEDURE — 25010000002 METHYLPREDNISOLONE PER 125 MG: Performed by: NURSE PRACTITIONER

## 2019-12-18 PROCEDURE — 25010000002 VANCOMYCIN 10 G RECONSTITUTED SOLUTION: Performed by: NURSE PRACTITIONER

## 2019-12-18 PROCEDURE — 25010000002 IOPAMIDOL 61 % SOLUTION: Performed by: EMERGENCY MEDICINE

## 2019-12-18 PROCEDURE — 96365 THER/PROPH/DIAG IV INF INIT: CPT

## 2019-12-18 PROCEDURE — 71046 X-RAY EXAM CHEST 2 VIEWS: CPT

## 2019-12-18 PROCEDURE — 85025 COMPLETE CBC W/AUTO DIFF WBC: CPT | Performed by: NURSE PRACTITIONER

## 2019-12-18 PROCEDURE — 25010000002 ONDANSETRON PER 1 MG: Performed by: EMERGENCY MEDICINE

## 2019-12-18 PROCEDURE — 83690 ASSAY OF LIPASE: CPT | Performed by: NURSE PRACTITIONER

## 2019-12-18 PROCEDURE — 96375 TX/PRO/DX INJ NEW DRUG ADDON: CPT

## 2019-12-18 PROCEDURE — 99024 POSTOP FOLLOW-UP VISIT: CPT | Performed by: PHYSICIAN ASSISTANT

## 2019-12-18 PROCEDURE — 99284 EMERGENCY DEPT VISIT MOD MDM: CPT

## 2019-12-18 PROCEDURE — 83605 ASSAY OF LACTIC ACID: CPT | Performed by: NURSE PRACTITIONER

## 2019-12-18 PROCEDURE — 25010000002 DIPHENHYDRAMINE PER 50 MG: Performed by: NURSE PRACTITIONER

## 2019-12-18 PROCEDURE — 74177 CT ABD & PELVIS W/CONTRAST: CPT

## 2019-12-18 PROCEDURE — 99220 PR INITIAL OBSERVATION CARE/DAY 70 MINUTES: CPT | Performed by: INTERNAL MEDICINE

## 2019-12-18 PROCEDURE — 96367 TX/PROPH/DG ADDL SEQ IV INF: CPT

## 2019-12-18 PROCEDURE — 87899 AGENT NOS ASSAY W/OPTIC: CPT | Performed by: NURSE PRACTITIONER

## 2019-12-18 PROCEDURE — 25010000002 MORPHINE PER 10 MG: Performed by: EMERGENCY MEDICINE

## 2019-12-18 PROCEDURE — 96366 THER/PROPH/DIAG IV INF ADDON: CPT

## 2019-12-18 PROCEDURE — 87040 BLOOD CULTURE FOR BACTERIA: CPT | Performed by: NURSE PRACTITIONER

## 2019-12-18 PROCEDURE — 96368 THER/DIAG CONCURRENT INF: CPT

## 2019-12-18 PROCEDURE — 25010000002 PIPERACILLIN SOD-TAZOBACTAM PER 1 G: Performed by: NURSE PRACTITIONER

## 2019-12-18 PROCEDURE — 80053 COMPREHEN METABOLIC PANEL: CPT | Performed by: NURSE PRACTITIONER

## 2019-12-18 PROCEDURE — 96376 TX/PRO/DX INJ SAME DRUG ADON: CPT

## 2019-12-18 RX ORDER — SODIUM CHLORIDE 0.9 % (FLUSH) 0.9 %
10 SYRINGE (ML) INJECTION EVERY 12 HOURS SCHEDULED
Status: DISCONTINUED | OUTPATIENT
Start: 2019-12-18 | End: 2019-12-21 | Stop reason: HOSPADM

## 2019-12-18 RX ORDER — MORPHINE SULFATE 2 MG/ML
1 INJECTION, SOLUTION INTRAMUSCULAR; INTRAVENOUS EVERY 6 HOURS PRN
Status: DISCONTINUED | OUTPATIENT
Start: 2019-12-18 | End: 2019-12-20

## 2019-12-18 RX ORDER — PANTOPRAZOLE SODIUM 40 MG/1
40 TABLET, DELAYED RELEASE ORAL
Status: DISCONTINUED | OUTPATIENT
Start: 2019-12-19 | End: 2019-12-21 | Stop reason: HOSPADM

## 2019-12-18 RX ORDER — DIPHENHYDRAMINE HYDROCHLORIDE 50 MG/ML
50 INJECTION INTRAMUSCULAR; INTRAVENOUS ONCE
Status: COMPLETED | OUTPATIENT
Start: 2019-12-18 | End: 2019-12-18

## 2019-12-18 RX ORDER — ACETAMINOPHEN 650 MG/1
650 SUPPOSITORY RECTAL EVERY 4 HOURS PRN
Status: DISCONTINUED | OUTPATIENT
Start: 2019-12-18 | End: 2019-12-21 | Stop reason: HOSPADM

## 2019-12-18 RX ORDER — SODIUM CHLORIDE 0.9 % (FLUSH) 0.9 %
10 SYRINGE (ML) INJECTION AS NEEDED
Status: DISCONTINUED | OUTPATIENT
Start: 2019-12-18 | End: 2019-12-21 | Stop reason: HOSPADM

## 2019-12-18 RX ORDER — BUPROPION HYDROCHLORIDE 150 MG/1
300 TABLET ORAL EVERY MORNING
Status: DISCONTINUED | OUTPATIENT
Start: 2019-12-19 | End: 2019-12-21 | Stop reason: HOSPADM

## 2019-12-18 RX ORDER — ZOLPIDEM TARTRATE 5 MG/1
10 TABLET ORAL NIGHTLY PRN
Status: DISCONTINUED | OUTPATIENT
Start: 2019-12-18 | End: 2019-12-21 | Stop reason: HOSPADM

## 2019-12-18 RX ORDER — METHYLPREDNISOLONE SODIUM SUCCINATE 125 MG/2ML
80 INJECTION, POWDER, LYOPHILIZED, FOR SOLUTION INTRAMUSCULAR; INTRAVENOUS ONCE
Status: COMPLETED | OUTPATIENT
Start: 2019-12-18 | End: 2019-12-18

## 2019-12-18 RX ORDER — ACETAMINOPHEN 160 MG/5ML
650 SOLUTION ORAL EVERY 4 HOURS PRN
Status: DISCONTINUED | OUTPATIENT
Start: 2019-12-18 | End: 2019-12-21 | Stop reason: HOSPADM

## 2019-12-18 RX ORDER — ACETAMINOPHEN 325 MG/1
650 TABLET ORAL EVERY 4 HOURS PRN
Status: DISCONTINUED | OUTPATIENT
Start: 2019-12-18 | End: 2019-12-21 | Stop reason: HOSPADM

## 2019-12-18 RX ORDER — ONDANSETRON 2 MG/ML
4 INJECTION INTRAMUSCULAR; INTRAVENOUS ONCE
Status: COMPLETED | OUTPATIENT
Start: 2019-12-18 | End: 2019-12-18

## 2019-12-18 RX ORDER — HYDROCODONE BITARTRATE AND ACETAMINOPHEN 10; 325 MG/1; MG/1
2 TABLET ORAL 2 TIMES DAILY PRN
Status: DISCONTINUED | OUTPATIENT
Start: 2019-12-18 | End: 2019-12-21 | Stop reason: HOSPADM

## 2019-12-18 RX ORDER — MORPHINE SULFATE 4 MG/ML
4 INJECTION, SOLUTION INTRAMUSCULAR; INTRAVENOUS ONCE
Status: COMPLETED | OUTPATIENT
Start: 2019-12-18 | End: 2019-12-18

## 2019-12-18 RX ADMIN — TAZOBACTAM SODIUM AND PIPERACILLIN SODIUM 3.38 G: 375; 3 INJECTION, SOLUTION INTRAVENOUS at 17:56

## 2019-12-18 RX ADMIN — MORPHINE SULFATE 4 MG: 4 INJECTION, SOLUTION INTRAMUSCULAR; INTRAVENOUS at 17:23

## 2019-12-18 RX ADMIN — DIPHENHYDRAMINE HYDROCHLORIDE 50 MG: 50 INJECTION INTRAMUSCULAR; INTRAVENOUS at 15:22

## 2019-12-18 RX ADMIN — MORPHINE SULFATE 4 MG: 4 INJECTION, SOLUTION INTRAMUSCULAR; INTRAVENOUS at 14:41

## 2019-12-18 RX ADMIN — SODIUM CHLORIDE, PRESERVATIVE FREE 10 ML: 5 INJECTION INTRAVENOUS at 17:23

## 2019-12-18 RX ADMIN — IOPAMIDOL 90 ML: 612 INJECTION, SOLUTION INTRAVENOUS at 15:43

## 2019-12-18 RX ADMIN — HYDROCODONE BITARTRATE AND ACETAMINOPHEN 2 TABLET: 10; 325 TABLET ORAL at 21:18

## 2019-12-18 RX ADMIN — VANCOMYCIN HYDROCHLORIDE 2750 MG: 10 INJECTION, POWDER, LYOPHILIZED, FOR SOLUTION INTRAVENOUS at 17:57

## 2019-12-18 RX ADMIN — ONDANSETRON 4 MG: 2 INJECTION INTRAMUSCULAR; INTRAVENOUS at 17:21

## 2019-12-18 RX ADMIN — ZOLPIDEM TARTRATE 10 MG: 5 TABLET ORAL at 21:18

## 2019-12-18 RX ADMIN — SODIUM CHLORIDE 500 ML: 9 INJECTION, SOLUTION INTRAVENOUS at 14:38

## 2019-12-18 RX ADMIN — METHYLPREDNISOLONE SODIUM SUCCINATE 80 MG: 125 INJECTION, POWDER, FOR SOLUTION INTRAMUSCULAR; INTRAVENOUS at 14:39

## 2019-12-18 NOTE — ED PROVIDER NOTES
Subjective   Sergio Lucero is a 34 y.o.male who presents to the ED with complaints of a post op problem. The patient had hiatal hernia repair surgery two days ago. He was discharged from the hospital yesterday. Today, the pain from the surgery has worsened, which prompted his visit to the emergency department today. He has tried taking Hydrocodone, but his pain has persisted. He also complains of a fever, chills, and nausea. He denies any shortness of breath or constipation. Additionally, he visited Dr. Angel's office earlier today before coming here for an evaluation. There are no other complaints at this time.       History provided by:  Patient  Illness   Location:  Abdominal  Quality:  Post op problem  Severity:  Moderate  Onset quality:  Sudden  Duration:  3 days  Timing:  Constant  Progression:  Worsening  Chronicity:  New  Context:  Hiatal hernia surgery two days ago. Worse pain today  Relieved by:  Nothing  Worsened by:  Nothing  Ineffective treatments:  Hydrocodone  Associated symptoms: abdominal pain, fever and nausea    Associated symptoms: no shortness of breath        Review of Systems   Constitutional: Positive for chills and fever.   Respiratory: Negative for shortness of breath.    Gastrointestinal: Positive for abdominal pain and nausea. Negative for constipation.   All other systems reviewed and are negative.      Past Medical History:   Diagnosis Date   • Acid reflux    • ADHD (attention deficit hyperactivity disorder)    • Anemia    • Anxiety    • Depression    • Fatty liver    • History of ankle fracture     RIGHT   • History of arm fracture     BOTH at separate times   • Insomnia    • Lumbar disc herniation     L4-L5   • Obstructive sleep apnea     on CPAP   • Wears glasses        Allergies   Allergen Reactions   • Fish-Derived Products Anaphylaxis   • Nuts Anaphylaxis   • Other Anaphylaxis     All Beans (mahajan, lima)   • Shellfish Allergy Anaphylaxis   • Iodine Hives     itching       Past  Surgical History:   Procedure Laterality Date   • ADENOIDECTOMY  2009   • KNEE ARTHROSCOPY Right 2009   • NISSEN FUNDOPLICATION N/A 12/16/2019    Procedure: NISSEN FUNDOPLICATION LAPAROSCOPIC;  Surgeon: Sky Angel MD;  Location:  [facility];  Service: General   • ORIF HUMERUS FRACTURE Right    • PARAESOPHAGEAL HERNIA REPAIR N/A 12/16/2019    Procedure: PARAESOPHAGEAL HERNIA REPAIR LAPAROSCOPIC WITH MESH;  Surgeon: Sky Angel MD;  Location:  [facility];  Service: General   • TONSILLECTOMY  2009   • WISDOM TOOTH EXTRACTION  1998       Family History   Problem Relation Age of Onset   • Heart attack Father 56   • Obesity Father    • Cancer Mother    • Obesity Mother    • No Known Problems Daughter    • No Known Problems Son    • No Known Problems Maternal Grandmother    • No Known Problems Maternal Grandfather    • No Known Problems Paternal Grandmother    • No Known Problems Paternal Grandfather        Social History     Socioeconomic History   • Marital status:      Spouse name: Not on file   • Number of children: 4   • Years of education: Not on file   • Highest education level: Not on file   Tobacco Use   • Smoking status: Never Smoker   • Smokeless tobacco: Never Used   Substance and Sexual Activity   • Alcohol use: Yes     Alcohol/week: 4.0 standard drinks     Types: 4 Cans of beer per week     Frequency: 4 or more times a week   • Drug use: No   • Sexual activity: Yes     Partners: Female         Objective   Physical Exam   Constitutional: He is oriented to person, place, and time. He appears well-developed and well-nourished.   Appears uncomfortable. Good historian.    HENT:   Head: Normocephalic and atraumatic.   Eyes: Conjunctivae are normal. No scleral icterus.   Neck: Normal range of motion. Neck supple. No JVD present.   Cardiovascular: Regular rhythm, normal heart sounds and intact distal pulses. Tachycardia present.   No murmur heard.  Pulmonary/Chest: Effort normal and breath  sounds normal. No respiratory distress.   Abdominal: Soft. Bowel sounds are normal. There is generalized tenderness and tenderness in the left upper quadrant.   Diffuse tenderness, particularly in the left upper quadrant.    Musculoskeletal: Normal range of motion. He exhibits no edema.   Neurological: He is alert and oriented to person, place, and time.   Skin: Skin is warm and dry. There is pallor.   Abdominal incisions are well approximated.    Psychiatric: He has a normal mood and affect. His behavior is normal.   Nursing note and vitals reviewed.      Procedures         ED Course  ED Course as of Dec 18 1717   Wed Dec 18, 2019   [address] complete. [id number]    [TB]   1714 Left lower lobe pneumonia noted as coincidental finding on CT of the abdomen.  Two-view chest now pending.  I discussed this case with Dr. Alamo as well as hospitalist Dr. NULL who concurs with hospitalization.  Patient and family understand and concur with this plan of care.  Hospital-acquired antibiotics have been ordered.  Patient insists on more pain meds    [MS]      ED Course User Index  [MS] Danitza Longoria APRN  [TB] Jam Calloway     Recent Results (from the past 24 hour(s))   Comprehensive Metabolic Panel    Collection Time: 12/18/19  2:38 PM   Result Value Ref Range    Glucose 103 (H) 65 - 99 mg/dL    BUN 13 6 - 20 mg/dL    Creatinine 0.87 0.76 - 1.27 mg/dL    Sodium 138 136 - 145 mmol/L    Potassium 4.1 3.5 - 5.2 mmol/L    Chloride 99 98 - 107 mmol/L    CO2 30.0 (H) 22.0 - 29.0 mmol/L    Calcium 9.1 8.6 - 10.5 mg/dL    Total Protein 6.9 6.0 - 8.5 g/dL    Albumin 3.90 3.50 - 5.20 g/dL    ALT (SGPT) 31 1 - 41 U/L    AST (SGOT) 44 (H) 1 - 40 U/L    Alkaline Phosphatase 77 39 - 117 U/L    Total Bilirubin 0.3 0.2 - 1.2 mg/dL    eGFR Non African Amer 99 >60 mL/min/1.73    Globulin 3.0 gm/dL    A/G Ratio 1.3 g/dL    BUN/Creatinine Ratio 14.9 7.0 - 25.0    Anion Gap 9.0 5.0 - 15.0 mmol/L   Lipase    Collection Time: 12/18/19   2:38 PM   Result Value Ref Range    Lipase 30 13 - 60 U/L   CBC Auto Differential    Collection Time: 12/18/19  2:38 PM   Result Value Ref Range    WBC 8.08 3.40 - 10.80 10*3/mm3    RBC 3.69 (L) 4.14 - 5.80 10*6/mm3    Hemoglobin 8.9 (L) 13.0 - 17.7 g/dL    Hematocrit 31.1 (L) 37.5 - 51.0 %    MCV 84.3 79.0 - 97.0 fL    MCH 24.1 (L) 26.6 - 33.0 pg    MCHC 28.6 (L) 31.5 - 35.7 g/dL    RDW 14.7 12.3 - 15.4 %    RDW-SD 45.2 37.0 - 54.0 fl    MPV 10.6 6.0 - 12.0 fL    Platelets 358 140 - 450 10*3/mm3    Neutrophil % 65.1 42.7 - 76.0 %    Lymphocyte % 16.7 (L) 19.6 - 45.3 %    Monocyte % 15.5 (H) 5.0 - 12.0 %    Eosinophil % 1.1 0.3 - 6.2 %    Basophil % 0.6 0.0 - 1.5 %    Immature Grans % 1.0 (H) 0.0 - 0.5 %    Neutrophils, Absolute 5.26 1.70 - 7.00 10*3/mm3    Lymphocytes, Absolute 1.35 0.70 - 3.10 10*3/mm3    Monocytes, Absolute 1.25 (H) 0.10 - 0.90 10*3/mm3    Eosinophils, Absolute 0.09 0.00 - 0.40 10*3/mm3    Basophils, Absolute 0.05 0.00 - 0.20 10*3/mm3    Immature Grans, Absolute 0.08 (H) 0.00 - 0.05 10*3/mm3    nRBC 0.0 0.0 - 0.2 /100 WBC     Note: In addition to lab results from this visit, the labs listed above may include labs taken at another facility or during a different encounter within the last 24 hours. Please correlate lab times with ED admission and discharge times for further clarification of the services performed during this visit.    CT Abdomen Pelvis With Contrast   Preliminary Result   1. Left lower lobe pneumonia.   2. Postoperative changes consistent with hiatal hernia repair. There is   a small of low amount of low density surrounding the operative site   presumably representing postoperative change.              XR Chest 2 View    (Results Pending)     Vitals:    12/18/19 1515 12/18/19 1530 12/18/19 1654 12/18/19 1655   BP: 125/76 137/76 145/83    BP Location:       Patient Position:       Pulse:       Resp:       Temp:       TempSrc:       SpO2: 96% 100%  98%   Weight:       Height:          Medications   sodium chloride 0.9 % flush 10 mL (has no administration in time range)   vancomycin 2750 mg/500 mL 0.9% NS IVPB ([facility]) (has no administration in time range)   piperacillin-tazobactam (ZOSYN) 3.375 g in iso-osmotic dextrose 50 ml (premix) (has no administration in time range)   Morphine sulfate (PF) injection 4 mg (has no administration in time range)   ondansetron (ZOFRAN) injection 4 mg (has no administration in time range)   sodium chloride 0.9 % bolus 500 mL (0 mL Intravenous Stopped 12/18/19 1508)   methylPREDNISolone sodium succinate (SOLU-Medrol) injection 80 mg (80 mg Intravenous Given 12/18/19 1439)   diphenhydrAMINE (BENADRYL) injection 50 mg (50 mg Intravenous Given 12/18/19 1522)   Morphine sulfate (PF) injection 4 mg (4 mg Intravenous Given 12/18/19 1441)   iopamidol (ISOVUE-300) 61 % injection 100 mL (90 mL Intravenous Given 12/18/19 1543)     ECG/EMG Results (last 24 hours)     ** No results found for the last 24 hours. **        No orders to display                     MDM    Final diagnoses:   Pneumonia of left lower lobe due to infectious organism (CMS/HCC)   HAP (hospital-acquired pneumonia)   Post-operative pain   History of hiatal hernia   Chronic pain disorder       Documentation assistance provided by bert Calloway.  Information recorded by the bert was done at my direction and has been verified and validated by me.     Jam Calloway  12/18/19 1411       Danitza Longoria APRN  12/18/19 1713

## 2019-12-18 NOTE — TELEPHONE ENCOUNTER
Pts wife called stating that pt was discharge lastnight after having a HHR. She states that he is very weak having chills. She states that temp was 99.2. She states that he having some pain but denies nausea and vomiting. Love advise thank you.

## 2019-12-18 NOTE — PROGRESS NOTES
"[facility]  [address]  [address]  [facility] [id number] [phone]        Patient Name: Sergio Lucero.  YOB: 1985      Date of Visit: 12/18/2019      Reason for Visit:  POD#2 - having issues    HPI:  Sergio Lucero is a 34 y.o. male s/p lap Nissen/paraHHR w/ phasix mesh 12/16/19 [facility]    Discharged last PM on POD#1.  Noted DC Hgb 9.0, preop Hgb 10.4.    Wife called this AM reporting patient was c/o weakness and chills w/ Tmax 100.4.  Added on for eval.      Today in the office patient appears very uncomfortable.  Guarding his abdomen w/ movement.  c/o uncontrolled LUQ abdominal pain since hospital discharge.  Says the IV Morphine he was getting yesterday while inpatient was clearly helping him more than he realized.  Was given Norco 10mg on discharge.  Says took 1 dose last pm and another dose this AM, but it \"did nothing\".  Reports pain worsening as time progresses.  Having nausea but tolerating PO w/out issue.  Drank Gatorade and a protein shake this AM.  Voiding w/out issue.  Denies dyspnea, chest pain, syncope, orthostasis.   Afebrile in office, but .      Past Medical History:   Diagnosis Date   • Acid reflux    • ADHD (attention deficit hyperactivity disorder)    • Anemia    • Anxiety    • Depression    • Fatty liver    • History of ankle fracture     RIGHT   • History of arm fracture     BOTH at separate times   • Insomnia    • Lumbar disc herniation     L4-L5   • Obstructive sleep apnea     on CPAP   • Wears glasses      Past Surgical History:   Procedure Laterality Date   • ADENOIDECTOMY  2009   • KNEE ARTHROSCOPY Right 2009   • NISSEN FUNDOPLICATION N/A 12/16/2019    Procedure: NISSEN FUNDOPLICATION LAPAROSCOPIC;  Surgeon: Sky Angel MD;  Location: [facility];  Service: General   • ORIF HUMERUS FRACTURE Right    • PARAESOPHAGEAL HERNIA REPAIR N/A 12/16/2019    Procedure: PARAESOPHAGEAL HERNIA REPAIR LAPAROSCOPIC WITH MESH;  " Surgeon: Sky Angel MD;  Location: [facility];  Service: General   • TONSILLECTOMY  2009   • WISDOM TOOTH EXTRACTION  1998     Outpatient Medications Marked as Taking for the 12/18/19 encounter (Office Visit) with Cris Tom PA   Medication Sig Dispense Refill   • buPROPion XL (WELLBUTRIN XL) 300 MG 24 hr tablet Take 1 tablet by mouth Every Morning. 30 tablet 2   • HYDROcodone-acetaminophen (NORCO)  MG per tablet Take 2 tablets by mouth As Needed.     • pantoprazole (PROTONIX) 40 MG EC tablet Take 1 tablet by mouth 30 minutes before breakfast daily. (Patient taking differently: Take 40 mg by mouth Daily. Take 1 tablet by mouth 30 minutes before breakfast daily.) 90 tablet 3   • zolpidem (AMBIEN) 10 MG tablet Take 1 tablet by mouth every night at bedtime. 30 tablet 0     Allergies   Allergen Reactions   • Fish-Derived Products Anaphylaxis   • Nuts Anaphylaxis   • Other Anaphylaxis     All Beans (mahajan, lima)   • Shellfish Allergy Anaphylaxis   • Iodine Hives     itching       Social History     Socioeconomic History   • Marital status:      Spouse name: Not on file   • Number of children: 4   • Years of education: Not on file   • Highest education level: Not on file   Tobacco Use   • Smoking status: Never Smoker   • Smokeless tobacco: Never Used   Substance and Sexual Activity   • Alcohol use: Yes     Alcohol/week: 4.0 standard drinks     Types: 4 Cans of beer per week     Frequency: 4 or more times a week   • Drug use: No   • Sexual activity: Yes     Partners: Female       Vitals:    12/18/19 1144   BP: 122/72   Pulse: 106   Resp: 18   Temp: 97.2 °F (36.2 °C)   SpO2: 99%     Weight 136 kg (300 lb)  Body mass index is 37.5 kg/m².    Physical Exam   Constitutional: He appears well-developed and well-nourished. He appears distressed.   Cardiovascular: Regular rhythm.   tachy 106   Pulmonary/Chest: Effort normal and breath sounds normal. No respiratory distress.   Abdominal: Soft. Bowel  sounds are normal. He exhibits no mass. No hernia.   lap incisions look good, but w/ exaggerated tenderness to light touch diffusely, (L) >(R)   Musculoskeletal: Normal range of motion.   Neurological: He is alert.   Skin: Skin is warm and dry.   Psychiatric: He has a normal mood and affect.         Assessment:  POD #2 s/p lap Nissen/paraHHR w/ phasix mesh 12/16/19 [facility]    ICD-10-CM ICD-9-CM   1. Postoperative abdominal pain R10.9 789.00    G89.18 338.18       Plan:  Discussed w/ Dr. Angel - pain uncontrolled w/ PO medication.  Will send to ER for expedited eval and attempted pain control.  Recommend labs and fluids.  Hopefully can DC home w/ adjusted PO pain med regimen.

## 2019-12-18 NOTE — TELEPHONE ENCOUNTER
Pts wife just called and was notified to come into the office at noon. She states they will be here. She states she was calling back because his fever has gone up to 100.4.

## 2019-12-19 ENCOUNTER — ANESTHESIA EVENT (OUTPATIENT)
Dept: GASTROENTEROLOGY | Facility: HOSPITAL | Age: 34
End: 2019-12-19

## 2019-12-19 ENCOUNTER — ANESTHESIA (OUTPATIENT)
Dept: GASTROENTEROLOGY | Facility: HOSPITAL | Age: 34
End: 2019-12-19

## 2019-12-19 LAB
ANION GAP SERPL CALCULATED.3IONS-SCNC: 10 MMOL/L (ref 5–15)
BASOPHILS # BLD AUTO: 0.01 10*3/MM3 (ref 0–0.2)
BASOPHILS NFR BLD AUTO: 0.1 % (ref 0–1.5)
BUN BLD-MCNC: 16 MG/DL (ref 6–20)
BUN/CREAT SERPL: 18.2 (ref 7–25)
CALCIUM SPEC-SCNC: 8.9 MG/DL (ref 8.6–10.5)
CHLORIDE SERPL-SCNC: 98 MMOL/L (ref 98–107)
CO2 SERPL-SCNC: 28 MMOL/L (ref 22–29)
CREAT BLD-MCNC: 0.88 MG/DL (ref 0.76–1.27)
DEPRECATED RDW RBC AUTO: 44.6 FL (ref 37–54)
EOSINOPHIL # BLD AUTO: 0 10*3/MM3 (ref 0–0.4)
EOSINOPHIL NFR BLD AUTO: 0 % (ref 0.3–6.2)
ERYTHROCYTE [DISTWIDTH] IN BLOOD BY AUTOMATED COUNT: 14.6 % (ref 12.3–15.4)
FERRITIN SERPL-MCNC: 104.2 NG/ML (ref 30–400)
FOLATE SERPL-MCNC: 8.12 NG/ML (ref 4.78–24.2)
GFR SERPL CREATININE-BSD FRML MDRD: 98 ML/MIN/1.73
GLUCOSE BLD-MCNC: 119 MG/DL (ref 65–99)
HCT VFR BLD AUTO: 28.8 % (ref 37.5–51)
HGB BLD-MCNC: 8.6 G/DL (ref 13–17.7)
IMM GRANULOCYTES # BLD AUTO: 0.06 10*3/MM3 (ref 0–0.05)
IMM GRANULOCYTES NFR BLD AUTO: 0.6 % (ref 0–0.5)
IRON 24H UR-MRATE: 20 MCG/DL (ref 59–158)
IRON SATN MFR SERPL: 5 % (ref 20–50)
L PNEUMO1 AG UR QL IA: NEGATIVE
LYMPHOCYTES # BLD AUTO: 0.63 10*3/MM3 (ref 0.7–3.1)
LYMPHOCYTES NFR BLD AUTO: 6.6 % (ref 19.6–45.3)
MCH RBC QN AUTO: 24.9 PG (ref 26.6–33)
MCHC RBC AUTO-ENTMCNC: 29.9 G/DL (ref 31.5–35.7)
MCV RBC AUTO: 83.5 FL (ref 79–97)
MONOCYTES # BLD AUTO: 0.99 10*3/MM3 (ref 0.1–0.9)
MONOCYTES NFR BLD AUTO: 10.3 % (ref 5–12)
NEUTROPHILS # BLD AUTO: 7.88 10*3/MM3 (ref 1.7–7)
NEUTROPHILS NFR BLD AUTO: 82.4 % (ref 42.7–76)
NRBC BLD AUTO-RTO: 0 /100 WBC (ref 0–0.2)
PLATELET # BLD AUTO: 385 10*3/MM3 (ref 140–450)
PMV BLD AUTO: 10.7 FL (ref 6–12)
POTASSIUM BLD-SCNC: 4.5 MMOL/L (ref 3.5–5.2)
PROCALCITONIN SERPL-MCNC: 0.07 NG/ML (ref 0.1–0.25)
RBC # BLD AUTO: 3.45 10*6/MM3 (ref 4.14–5.8)
S PNEUM AG SPEC QL LA: NEGATIVE
SODIUM BLD-SCNC: 136 MMOL/L (ref 136–145)
TIBC SERPL-MCNC: 404 MCG/DL (ref 298–536)
TRANSFERRIN SERPL-MCNC: 271 MG/DL (ref 200–360)
VIT B12 BLD-MCNC: 846 PG/ML (ref 211–946)
WBC NRBC COR # BLD: 9.57 10*3/MM3 (ref 3.4–10.8)

## 2019-12-19 PROCEDURE — 82747 ASSAY OF FOLIC ACID RBC: CPT | Performed by: NURSE PRACTITIONER

## 2019-12-19 PROCEDURE — 85025 COMPLETE CBC W/AUTO DIFF WBC: CPT | Performed by: NURSE PRACTITIONER

## 2019-12-19 PROCEDURE — 25010000002 PROPOFOL 10 MG/ML EMULSION: Performed by: ANESTHESIOLOGY

## 2019-12-19 PROCEDURE — 82728 ASSAY OF FERRITIN: CPT | Performed by: NURSE PRACTITIONER

## 2019-12-19 PROCEDURE — G0378 HOSPITAL OBSERVATION PER HR: HCPCS

## 2019-12-19 PROCEDURE — 25010000002 PIPERACILLIN SOD-TAZOBACTAM PER 1 G: Performed by: NURSE PRACTITIONER

## 2019-12-19 PROCEDURE — 96376 TX/PRO/DX INJ SAME DRUG ADON: CPT

## 2019-12-19 PROCEDURE — A9270 NON-COVERED ITEM OR SERVICE: HCPCS | Performed by: PHYSICIAN ASSISTANT

## 2019-12-19 PROCEDURE — 25010000002 VANCOMYCIN 10 G RECONSTITUTED SOLUTION: Performed by: INTERNAL MEDICINE

## 2019-12-19 PROCEDURE — 25010000002 MIDAZOLAM PER 1 MG: Performed by: ANESTHESIOLOGY

## 2019-12-19 PROCEDURE — 85014 HEMATOCRIT: CPT | Performed by: NURSE PRACTITIONER

## 2019-12-19 PROCEDURE — 84145 PROCALCITONIN (PCT): CPT

## 2019-12-19 PROCEDURE — 43249 ESOPH EGD DILATION <30 MM: CPT | Performed by: SURGERY

## 2019-12-19 PROCEDURE — 25010000002 SUCCINYLCHOLINE PER 20 MG: Performed by: ANESTHESIOLOGY

## 2019-12-19 PROCEDURE — 96366 THER/PROPH/DIAG IV INF ADDON: CPT

## 2019-12-19 PROCEDURE — 84466 ASSAY OF TRANSFERRIN: CPT | Performed by: NURSE PRACTITIONER

## 2019-12-19 PROCEDURE — 63710000001 OXYCODONE-ACETAMINOPHEN 10-325 MG TABLET: Performed by: PHYSICIAN ASSISTANT

## 2019-12-19 PROCEDURE — 99226 PR SBSQ OBSERVATION CARE/DAY 35 MINUTES: CPT | Performed by: INTERNAL MEDICINE

## 2019-12-19 PROCEDURE — 82746 ASSAY OF FOLIC ACID SERUM: CPT | Performed by: NURSE PRACTITIONER

## 2019-12-19 PROCEDURE — 63710000001 LORAZEPAM 1 MG TABLET: Performed by: PHYSICIAN ASSISTANT

## 2019-12-19 PROCEDURE — 82607 VITAMIN B-12: CPT | Performed by: NURSE PRACTITIONER

## 2019-12-19 PROCEDURE — 25010000002 DEXAMETHASONE PER 1 MG: Performed by: ANESTHESIOLOGY

## 2019-12-19 PROCEDURE — 83540 ASSAY OF IRON: CPT | Performed by: NURSE PRACTITIONER

## 2019-12-19 PROCEDURE — 63710000001 ZOLPIDEM 5 MG TABLET: Performed by: PHYSICIAN ASSISTANT

## 2019-12-19 PROCEDURE — 25010000002 MORPHINE PER 10 MG: Performed by: NURSE PRACTITIONER

## 2019-12-19 PROCEDURE — 25010000002 ONDANSETRON PER 1 MG: Performed by: ANESTHESIOLOGY

## 2019-12-19 PROCEDURE — 82608 B-12 BINDING CAPACITY: CPT | Performed by: NURSE PRACTITIONER

## 2019-12-19 PROCEDURE — 25010000002 MORPHINE PER 10 MG: Performed by: PHYSICIAN ASSISTANT

## 2019-12-19 PROCEDURE — C1726 CATH, BAL DIL, NON-VASCULAR: HCPCS | Performed by: SURGERY

## 2019-12-19 PROCEDURE — 80048 BASIC METABOLIC PNL TOTAL CA: CPT | Performed by: NURSE PRACTITIONER

## 2019-12-19 RX ORDER — FENTANYL CITRATE 50 UG/ML
50 INJECTION, SOLUTION INTRAMUSCULAR; INTRAVENOUS
Status: DISCONTINUED | OUTPATIENT
Start: 2019-12-19 | End: 2019-12-19 | Stop reason: HOSPADM

## 2019-12-19 RX ORDER — DEXAMETHASONE SODIUM PHOSPHATE 4 MG/ML
INJECTION, SOLUTION INTRA-ARTICULAR; INTRALESIONAL; INTRAMUSCULAR; INTRAVENOUS; SOFT TISSUE AS NEEDED
Status: DISCONTINUED | OUTPATIENT
Start: 2019-12-19 | End: 2019-12-19 | Stop reason: SURG

## 2019-12-19 RX ORDER — ESMOLOL HYDROCHLORIDE 10 MG/ML
INJECTION INTRAVENOUS AS NEEDED
Status: DISCONTINUED | OUTPATIENT
Start: 2019-12-19 | End: 2019-12-19 | Stop reason: SURG

## 2019-12-19 RX ORDER — SUCCINYLCHOLINE CHLORIDE 20 MG/ML
INJECTION INTRAMUSCULAR; INTRAVENOUS AS NEEDED
Status: DISCONTINUED | OUTPATIENT
Start: 2019-12-19 | End: 2019-12-19 | Stop reason: SURG

## 2019-12-19 RX ORDER — PROMETHAZINE HYDROCHLORIDE 25 MG/1
25 SUPPOSITORY RECTAL ONCE AS NEEDED
Status: DISCONTINUED | OUTPATIENT
Start: 2019-12-19 | End: 2019-12-19 | Stop reason: HOSPADM

## 2019-12-19 RX ORDER — OXYCODONE AND ACETAMINOPHEN 10; 325 MG/1; MG/1
1 TABLET ORAL EVERY 4 HOURS PRN
Status: DISCONTINUED | OUTPATIENT
Start: 2019-12-19 | End: 2019-12-21 | Stop reason: HOSPADM

## 2019-12-19 RX ORDER — PROMETHAZINE HYDROCHLORIDE 25 MG/1
25 TABLET ORAL ONCE AS NEEDED
Status: DISCONTINUED | OUTPATIENT
Start: 2019-12-19 | End: 2019-12-19 | Stop reason: HOSPADM

## 2019-12-19 RX ORDER — PROMETHAZINE HYDROCHLORIDE 25 MG/ML
6.25 INJECTION, SOLUTION INTRAMUSCULAR; INTRAVENOUS ONCE AS NEEDED
Status: DISCONTINUED | OUTPATIENT
Start: 2019-12-19 | End: 2019-12-19 | Stop reason: HOSPADM

## 2019-12-19 RX ORDER — PROPOFOL 10 MG/ML
VIAL (ML) INTRAVENOUS AS NEEDED
Status: DISCONTINUED | OUTPATIENT
Start: 2019-12-19 | End: 2019-12-19 | Stop reason: SURG

## 2019-12-19 RX ORDER — LORAZEPAM 1 MG/1
1 TABLET ORAL NIGHTLY
Status: DISCONTINUED | OUTPATIENT
Start: 2019-12-19 | End: 2019-12-21 | Stop reason: HOSPADM

## 2019-12-19 RX ORDER — SODIUM CHLORIDE, SODIUM LACTATE, POTASSIUM CHLORIDE, CALCIUM CHLORIDE 600; 310; 30; 20 MG/100ML; MG/100ML; MG/100ML; MG/100ML
INJECTION, SOLUTION INTRAVENOUS CONTINUOUS PRN
Status: DISCONTINUED | OUTPATIENT
Start: 2019-12-19 | End: 2019-12-19 | Stop reason: SURG

## 2019-12-19 RX ORDER — ONDANSETRON 2 MG/ML
INJECTION INTRAMUSCULAR; INTRAVENOUS AS NEEDED
Status: DISCONTINUED | OUTPATIENT
Start: 2019-12-19 | End: 2019-12-19 | Stop reason: SURG

## 2019-12-19 RX ORDER — MIDAZOLAM HYDROCHLORIDE 1 MG/ML
2 INJECTION INTRAMUSCULAR; INTRAVENOUS ONCE
Status: COMPLETED | OUTPATIENT
Start: 2019-12-19 | End: 2019-12-19

## 2019-12-19 RX ADMIN — ESMOLOL HYDROCHLORIDE 30 MG: 10 INJECTION, SOLUTION INTRAVENOUS at 18:03

## 2019-12-19 RX ADMIN — MIDAZOLAM 2 MG: 1 INJECTION INTRAMUSCULAR; INTRAVENOUS at 16:55

## 2019-12-19 RX ADMIN — DEXAMETHASONE SODIUM PHOSPHATE 4 MG: 4 INJECTION, SOLUTION INTRAMUSCULAR; INTRAVENOUS at 18:03

## 2019-12-19 RX ADMIN — TAZOBACTAM SODIUM AND PIPERACILLIN SODIUM 3.38 G: 375; 3 INJECTION, SOLUTION INTRAVENOUS at 09:27

## 2019-12-19 RX ADMIN — PANTOPRAZOLE SODIUM 40 MG: 40 TABLET, DELAYED RELEASE ORAL at 08:44

## 2019-12-19 RX ADMIN — MORPHINE SULFATE 1 MG: 2 INJECTION, SOLUTION INTRAMUSCULAR; INTRAVENOUS at 01:24

## 2019-12-19 RX ADMIN — ZOLPIDEM TARTRATE 10 MG: 5 TABLET ORAL at 20:47

## 2019-12-19 RX ADMIN — OXYCODONE HYDROCHLORIDE AND ACETAMINOPHEN 1 TABLET: 10; 325 TABLET ORAL at 09:27

## 2019-12-19 RX ADMIN — BUPROPION HYDROCHLORIDE 300 MG: 150 TABLET, FILM COATED, EXTENDED RELEASE ORAL at 08:44

## 2019-12-19 RX ADMIN — OXYCODONE HYDROCHLORIDE AND ACETAMINOPHEN 1 TABLET: 10; 325 TABLET ORAL at 20:47

## 2019-12-19 RX ADMIN — SUCCINYLCHOLINE CHLORIDE 120 MG: 20 INJECTION, SOLUTION INTRAMUSCULAR; INTRAVENOUS at 18:03

## 2019-12-19 RX ADMIN — TAZOBACTAM SODIUM AND PIPERACILLIN SODIUM 3.38 G: 375; 3 INJECTION, SOLUTION INTRAVENOUS at 01:24

## 2019-12-19 RX ADMIN — VANCOMYCIN HYDROCHLORIDE 1250 MG: 10 INJECTION, POWDER, LYOPHILIZED, FOR SOLUTION INTRAVENOUS at 01:24

## 2019-12-19 RX ADMIN — MORPHINE SULFATE 1 MG: 2 INJECTION, SOLUTION INTRAMUSCULAR; INTRAVENOUS at 19:16

## 2019-12-19 RX ADMIN — PROPOFOL 200 MG: 10 INJECTION, EMULSION INTRAVENOUS at 18:03

## 2019-12-19 RX ADMIN — MORPHINE SULFATE 1 MG: 2 INJECTION, SOLUTION INTRAMUSCULAR; INTRAVENOUS at 07:18

## 2019-12-19 RX ADMIN — OXYCODONE HYDROCHLORIDE AND ACETAMINOPHEN 1 TABLET: 10; 325 TABLET ORAL at 13:20

## 2019-12-19 RX ADMIN — LORAZEPAM 1 MG: 1 TABLET ORAL at 21:12

## 2019-12-19 RX ADMIN — SODIUM CHLORIDE, POTASSIUM CHLORIDE, SODIUM LACTATE AND CALCIUM CHLORIDE: 600; 310; 30; 20 INJECTION, SOLUTION INTRAVENOUS at 17:59

## 2019-12-19 RX ADMIN — ONDANSETRON 4 MG: 2 INJECTION INTRAMUSCULAR; INTRAVENOUS at 18:03

## 2019-12-19 NOTE — PROGRESS NOTES
[facility] Medicine Services  PROGRESS NOTE    Patient Name: Sergio Lucero  : 1985  MRN: [id number]    Date of Admission: 2019  Primary Care Physician: Noelle Roberts MD    Subjective   Subjective     CC: f/u abd pain    HPI: Up in bed resting comfortably. Pain is much better. Still denies respiratory symptoms.    Review of Systems  Gen- No fevers, chills  CV- No chest pain, palpitations  Resp- No cough, dyspnea  GI- No N/V/D, abd pain    Objective   Objective     Vital Signs:   Temp:  [97.2 °F (36.2 °C)-98.7 °F (37.1 °C)] 98.1 °F (36.7 °C)  Heart Rate:  [] 80  Resp:  [16-20] 16  BP: (122-145)/(64-83) 126/68        Physical Exam:  Constitutional: No acute distress, awake, alert, comfortable appearing  HENT: NCAT, mucous membranes moist  Respiratory: Clear to auscultation bilaterally, respiratory effort normal   Cardiovascular: RRR, no murmurs, rubs, or gallops, palpable pedal pulses bilaterally  Gastrointestinal: Positive bowel sounds, soft, minimal TTP, nondistended  Musculoskeletal: No bilateral ankle edema  Psychiatric: Appropriate affect, cooperative  Neurologic: Oriented x 3, strength symmetric in all extremities, Cranial Nerves grossly intact to confrontation, speech clear  Skin: No rashes    Results Reviewed:    Results from last 7 days   Lab Units 19  0456 19  1438 19  1422 19  0509   WBC 10*3/mm3 9.57 8.08  --  9.71   HEMOGLOBIN g/dL 8.6* 8.9* 9.0* 8.7*   HEMATOCRIT % 28.8* 31.1* 30.9* 28.5*   PLATELETS 10*3/mm3 385 358  --  368   PROCALCITONIN ng/mL 0.07*  --   --   --      Results from last 7 days   Lab Units 19  0456 19  1438 19  0509   SODIUM mmol/L 136 138 138   POTASSIUM mmol/L 4.5 4.1 4.6   CHLORIDE mmol/L 98 99 101   CO2 mmol/L 28.0 30.0* 26.0   BUN mg/dL 16 13 11   CREATININE mg/dL 0.88 0.87 0.87   GLUCOSE mg/dL 119* 103* 111*   CALCIUM mg/dL 8.9 9.1 9.1   ALT (SGPT) U/L  --  31 16   AST (SGOT)  U/L  --  44* 20     Estimated Creatinine Clearance: 172.3 mL/min (by C-G formula based on SCr of 0.88 mg/dL).    Microbiology Results Abnormal     Procedure Component Value - Date/Time    S. Pneumo Ag Urine or CSF - Urine, Urine, Clean Catch [[id number]]  (Normal) Collected:  12/18/19 2050    Lab Status:  Final result Specimen:  Urine, Clean Catch Updated:  12/19/19 0709     Strep Pneumo Ag Negative    Legionella Antigen, Urine - Urine, Urine, Clean Catch [[id number]]  (Normal) Collected:  12/18/19 2050    Lab Status:  Final result Specimen:  Urine, Clean Catch Updated:  12/19/19 0709     LEGIONELLA ANTIGEN, URINE Negative        CT A/P personally reviewed showing left lung airspace dz c/w pneumonia. Agree with interpretation.  Imaging Results (Last 24 Hours)     Procedure Component Value Units Date/Time    CT Abdomen Pelvis With Contrast [[id number]] Collected:  12/18/19 1556     Updated:  12/19/19 0835    Narrative:       EXAMINATION: CT ABDOMEN/PELVIS WITH CONTRAST - 12/18/2019     INDICATION: Post-op fever and nausea.     TECHNIQUE: CT abdomen and pelvis performed following intravenous  contrast.     The radiation dose reduction device was turned on for each scan per the  ALARA (As Low as Reasonably Achievable) protocol.     COMPARISON: None     FINDINGS: The most superior images demonstrate left lower lobe airspace  disease with air bronchograms. The liver and spleen are normal. There is  no adrenal or pancreatic mass. There is no renal mass, stone or  obstruction and  there is no ascites or retroperitoneal lymphadenopathy.  There is no pelvic mass. There is a small amount of pelvic fluid. There  is no inguinal lymphadenopathy. The postoperative changes consistent  with a hiatal hernia repair. There is low-density somewhat surrounding  the operative site presumably representing postoperative change.       Impression:       1. Left lower lobe pneumonia.  2. Postoperative changes consistent with hiatal hernia  repair. There is  a small amount of low density surrounding the operative site presumably  representing postoperative change.     DICTATED:   12/18/2019  EDITED/ls :   12/18/2019         This report was finalized on 12/19/2019 8:32 AM by Dr. Tree Asif MD.       XR Chest 2 View [[id number]] Collected:  12/18/19 1736     Updated:  12/18/19 2029    Narrative:          EXAMINATION: XR CHEST 2 VW - 12/18/2019     INDICATION:  J18.1-Lobar pneumonia, unspecified organism;  J18.9-Pneumonia, unspecified organism; D89-Prislmifmy condition;  G89.18-Other acute postprocedural pain; Z87.19-Personal history of other  diseases of the digestive system; G89.4-Chronic pain syndrome      COMPARISON: None     FINDINGS: Cardiac size borderline enlarged with opacifications of the  left lung base blunting the left lateral costophrenic sulcus and left  hemidiaphragm margins of atelectasis versus airspace disease without  large effusion or overt edema. No pneumothorax.           Impression:       Left lower lobe opacifications consistent with airspace  disease seen on recent CT however no large effusion.     DICTATED:   12/18/2019  EDITED/ls :   12/18/2019                      I have reviewed the medications:  Scheduled Meds:  buPROPion  mg Oral QAM   pantoprazole 40 mg Oral QAM AC   piperacillin-tazobactam 3.375 g Intravenous Q8H   sodium chloride 10 mL Intravenous Q12H     Continuous Infusions:   PRN Meds:.•  acetaminophen **OR** acetaminophen **OR** acetaminophen  •  HYDROcodone-acetaminophen  •  Morphine  •  oxyCODONE-acetaminophen  •  [COMPLETED] Insert peripheral IV **AND** sodium chloride  •  sodium chloride  •  zolpidem      Assessment/Plan   Assessment / Plan     Active Hospital Problems    Diagnosis  POA   • **HCAP (healthcare-associated pneumonia) [J18.9]  Yes   • Post-op pain [G89.18]  Yes   • Pneumonia of left lower lobe due to infectious organism (CMS/HCC) [J18.1]  Yes   • Chronic pain disorder [G89.4]  Yes   •  Attention deficit disorder (ADD) without hyperactivity [F98.8]  Yes   • Anxiety with depression [F41.8]  Yes   • Obstructive sleep apnea [G47.33]  Yes      Resolved Hospital Problems   No resolved problems to display.        Brief Hospital Course to date:  34 year old s/p hiatal hernia repair 12- presents to the ED with worsening pain at his surgical site. Incidentally LLL pneumonia was identified on CT of abdomen and pelvis. This is my first day assessing patient's active medical issues.    A/P:     Early LLL bacterial pneumonia.  -Blood cultures NGTD.  -Stop IV vancomycin and continue IV zosyn for now. Anticipate transition to PO quinolones at d/c to complete therapy.  -Encouraged ICS, pulmonary toilet.     Post Op Pain s/p hiatal hernia repair with mesh by Dr. Angel  -Dr. Angel has seen. For EGD today given gastric distension w/ further plan to follow.  -Stop his home norco and switch to PO percocet for attempt at better pain control.     ADD  Anxiety/depression  -on adderall  -continue wellbutrin     Normocytic anemia  -Studies mixed but most c/w anemia of chronic disease. Continue to monitor, transfusing as needed.    HERNAN  -on CPAP at HS     Dispo: Anticipate home later today or early tomorrow pending results of EGD and pain control on oral meds.     CODE STATUS:   Code Status and Medical Interventions:   Ordered at: 12/18/19 1941     Level Of Support Discussed With:    Patient     Code Status:    CPR     Medical Interventions (Level of Support Prior to Arrest):    Full         Electronically signed by Sujey Lr II, DO, 12/19/19, 9:32 AM.

## 2019-12-19 NOTE — OP NOTE
Preoperative Diagnosis: Gastric distention postoperative day #3 repair large paraesophageal hiatal hernia with intrathoracic stomach and Nissen fundoplication    Postoperative Diagnosis: Same with retained food and pyloric spasm    Procedure:   EGD with balloon dilatation of the pylorus to 18 mm    Surgeon:  Stanley    Anesth:  LOVE    Specimens: None    Complics:  None    Findings:  retained food in stomach, pyloric spasm, opened nicely, no balloon trauma (18 mm - largest available)    Indications:   This is a 35 yo morbidly obese white male known to me status post laparoscopic reduction of intrathoracic stomach, type IV paraesophageal hiatal hernia repair with mesh and Nissen fundoplication.  He was discharged home postop day #1 and return postoperative day #2 with intractable pain CT scan showed left lower lobe pneumonia and on my review a dilated stomach.  He had received breakfast which included eggs.  He does have some mild nausea.  I saw him earlier today and risks, benefits and alternative therapies were discussed and the patient wishes to proceed with diagnostic possible therapeutic upper endoscopy.  Because he had eaten the anesthesiology staff felt we should wait until after 4:30 PM to perform the procedure and do it under rapid sequence general endotracheal anesthesia.    Operative Technique:  The patient was brought to the endoscopy suite and placed supine upon the stretcher.  He underwent uneventful general endotracheal anesthesia per the anesthesiology staff bite-block was placed and initially   the standard flexible endoscope was advanced under direct visualization into the posterior pharynx, and the esophagus intubated.  The endoscope advanced easily through the esophagus, no dilatation or spasms or atony.  No retained food.  On reaching the distal esophagus no visible hiatal hernia, Foy's esophagus or gross esophagitis.  The gastric cavity was entered and there was a large amount of retained  liquid food with yellow particulate matter which was suctioned.  Occasionally this would plug the endoscope and it had to be withdrawn and cleaned off.  I switched to a therapeutic endoscope.  This helped somewhat but a lot of the particles were too large to be suction.  There was one group of retained secretions and food covering cardia and fundus and another in the prepyloric region.  I went ahead and advanced the endoscope and located the pylorus it appeared to be in spasm.  The therapeutic endoscope advanced with minimal resistance through the area into the first, second and third portions of the duodenum which were unremarkable.  The largest available balloon which was a 15 to 18 mm balloon was then placed into the duodenum and withdrawn such that the center of the balloon was over the pylorus.  Interestingly prior to dilatation the pylorus appeared to be fairly widely patent.  The pylorus was sequentially dilated to 18 mm, no significant resistance encountered.  It was left for a full minute and 18 mm and then desufflated and removed.  No balloon trauma noted to the pylorus.  I tried to suction out as much of the proximal secretions as possible to visualize the wrap which appeared intact.  Several photos obtained throughout.  The endoscope was withdrawn.      The patient tolerated the procedure well without complication and was taken to the recovery room in stable condition.  Clear liquid diet ordered an upper GI ordered for the morning.

## 2019-12-19 NOTE — BRIEF OP NOTE
ESOPHAGOGASTRODUODENOSCOPY WITH PYLORIC DILATATION  Progress Note    Sergio Lucero  12/19/2019    Pre-op Diagnosis:   gastric distention       Post-Op Diagnosis Codes:     * Gastric distention [K31.89]    Procedure/CPT® Codes:  WA EDG BALLOON DILATION ESOPHAGUS <30 MM DIAM [43430]    Procedure(s):  ESOPHAGOGASTRODUODENOSCOPY WITH PYLORIC DILATATION    Surgeon(s):  Sky Angel MD    Anesthesia: * No anesthesia type entered *    Staff:   Circulator: Madyson Mares RN  Endo Nurse: Luz Elena Mulligan RN    Estimated Blood Loss: none    Urine Voided: * No values recorded between 12/19/2019  6:00 PM and 12/19/2019  6:28 PM *    Specimens:                None          Drains:   [REMOVED] Closed/Suction Drain 1 RUQ (Removed)       Findings: retained food in stomach, pyloric spasm, opened nicely, no balloon trauma (18 mm - largest available)    Complications: none      Sky Angel MD     Date: 12/19/2019  Time: 6:36 PM

## 2019-12-19 NOTE — H&P
"[facility] Medicine Services  HISTORY AND PHYSICAL    Patient Name: Sergio Lucero  : 1985  MRN: [id number]  Primary Care Physician: Noelle Roberts MD    Subjective   Subjective     Chief Complaint:  Pain at surgical site    HPI:  Sergio Lucero is a 34 y.o. male with a past medical history significant for ADHD, generalized anxiety, depression and HERNAN presents to the ED with complaints of \"pain at his surgical site.\"  Patient was recently admitted to [facility] from 2019 to 2019 and underwent a paraesophageal hernia repair laparoscopic with mesh by Dr. Angel.  Patient reports that since his discharge he has had worsening pain at his surgical site therefore prompting his arrival to the ED.      Attending HPI  Pt states he has had nausea, fever 100.4 but no vomiting or diarrhea.  Has had increased abd pain.  Thinks pain meds not as affective.  No cp or shortness of breath. No leg swelling.  No cough.  Goes to pain mgmt and is on norco for 2 slipped disks and fracture vertebrae per pt.   Poor po intake today.    Review of Systems     Otherwise 10-system ROS reviewed and is negative except as mentioned in the HPI.    Personal History     Past Medical History:   Diagnosis Date   • Acid reflux    • ADHD (attention deficit hyperactivity disorder)    • Anemia    • Anxiety    • Depression    • Fatty liver    • History of ankle fracture     RIGHT   • History of arm fracture     BOTH at separate times   • Insomnia    • Lumbar disc herniation     L4-L5   • Obstructive sleep apnea     on CPAP   • Wears glasses        Past Surgical History:   Procedure Laterality Date   • ADENOIDECTOMY     • KNEE ARTHROSCOPY Right    • NISSEN FUNDOPLICATION N/A 2019    Procedure: NISSEN FUNDOPLICATION LAPAROSCOPIC;  Surgeon: Sky Angel MD;  [facility];  Service: General   • ORIF HUMERUS FRACTURE Right    • PARAESOPHAGEAL HERNIA REPAIR N/A 2019    Procedure: " PARAESOPHAGEAL HERNIA REPAIR LAPAROSCOPIC WITH MESH;  Surgeon: Sky Angel MD;  Location: [facility];  Service: General   • TONSILLECTOMY  2009   • WISDOM TOOTH EXTRACTION  1998       Family History: family history includes Cancer in his mother; Heart attack (age of onset: 56) in his father; No Known Problems in his daughter, maternal grandfather, maternal grandmother, paternal grandfather, paternal grandmother, and son; Obesity in his father and mother.     Social History:  reports that he has never smoked. He has never used smokeless tobacco. He reports that he drinks about 4.0 standard drinks of alcohol per week. He reports that he does not use drugs.    Medications:    (Not in a hospital admission)    Allergies   Allergen Reactions   • Fish-Derived Products Anaphylaxis   • Nuts Anaphylaxis   • Other Anaphylaxis     All Beans (mahajan, lima)   • Shellfish Allergy Anaphylaxis   • Iodine Hives     itching       Objective   Objective     Vital Signs:   Temp:  [97.2 °F (36.2 °C)-98.6 °F (37 °C)] 98.6 °F (37 °C)  Heart Rate:  [100-106] 100  Resp:  [18-20] 20  BP: (122-145)/(72-83) 145/83        Physical Exam   Gen; alert, oriented, nad  Heent; perrla, eomi, mmm  Cv; rr w/ tachycardia, no mrg  L; ctab no wheeze/crackles, dec bs's bll  Abd; soft, +bs, ttp at incision sites but cdi  Ext; no cce, 2+ pulses, pale  Skin; above  Neuro; grossly intact  Psych; mood and affect appropriate    Results Reviewed:  I have personally reviewed current lab, radiology, and data and agree.    Results from last 7 days   Lab Units 12/18/19  1438   WBC 10*3/mm3 8.08   HEMOGLOBIN g/dL 8.9*   HEMATOCRIT % 31.1*   PLATELETS 10*3/mm3 358     Results from last 7 days   Lab Units 12/18/19  1438   SODIUM mmol/L 138   POTASSIUM mmol/L 4.1   CHLORIDE mmol/L 99   CO2 mmol/L 30.0*   BUN mg/dL 13   CREATININE mg/dL 0.87   GLUCOSE mg/dL 103*   CALCIUM mg/dL 9.1   ALT (SGPT) U/L 31   AST (SGOT) U/L 44*     No results found for: BNP  No results  found for: PHART, PCO2  Imaging Results (Last 24 Hours)     Procedure Component Value Units Date/Time    XR Chest 2 View [[id number]] Collected:  12/18/19 1736     Updated:  12/18/19 1737    Narrative:          EXAMINATION: XR CHEST 2 VW-      INDICATION: LLL pneumonia seen on CT abdomen today; 2 days post op;  J18.1-Lobar pneumonia, unspecified organism; J18.9-Pneumonia,  unspecified organism; J28-Wvuqyrnrjj condition; G89.18-Other acute  postprocedural pain; Z87.19-Personal history of other diseases of the  digestive system; G89.4-Chronic pain syndrome      COMPARISON: NONE     FINDINGS: Cardiac size borderline enlarged with opacifications of the  left lung base blunting the left lateral costophrenic sulcus and left  hemidiaphragm margins of atelectasis versus airspace disease without  large effusion or overt edema. No pneumothorax.           Impression:       Left lower lobe opacifications consistent with airspace  disease seen on recent CT however no large effusion          CT Abdomen Pelvis With Contrast [[id number]] Collected:  12/18/19 1556     Updated:  12/18/19 1724    Narrative:       EXAMINATION: CT ABDOMEN/PELVIS WITH CONTRAST - 12/18/2019     INDICATION: Post-op fever and nausea.     TECHNIQUE: CT abdomen and pelvis performed following intravenous  contrast.     The radiation dose reduction device was turned on for each scan per the  ALARA (As Low as Reasonably Achievable) protocol.     COMPARISON: None     FINDINGS: The most superior images demonstrate left lower lobe airspace  disease with air bronchograms. The liver and spleen are normal. There is  no adrenal or pancreatic mass. There is no renal mass, stone or  obstruction and  there is no ascites or retroperitoneal lymphadenopathy.  There is no pelvic mass. There is a small amount of pelvic fluid. There  is no inguinal lymphadenopathy. The postoperative changes consistent  with a hiatal hernia repair. There is low-density somewhat surrounding  the  operative site presumably representing postoperative change.       Impression:       1. Left lower lobe pneumonia.  2. Postoperative changes consistent with hiatal hernia repair. There is  a small amount of low density surrounding the operative site presumably  representing postoperative change.     DICTATED:   12/18/2019  EDITED/ls :   12/18/2019                    Assessment/Plan   Assessment / Plan     Active Hospital Problems    Diagnosis   • **HCAP (healthcare-associated pneumonia)   • Post-op pain   • Chronic pain disorder   • Attention deficit disorder (ADD) without hyperactivity   • Anxiety with depression   • Obstructive sleep apnea         Assessment & Plan:  34 year old s/p hiatal hernia repair 12- presents to the ED with worsening pain at his surgical site.  Incidentally LLL pneumonia was identified on CT of abdomen and pelvis.     1) HCAP incidentally found on CT but currently afebrile and w/o leukocytosis or O2 requirement; abx as outlined below and encourage IS  -blood cultures pending  -CT of abdomen and pelvis as mentioned above  -continue vancomycin and zosyn  -sputum culture  -check urine antigens  -continuous pulse ox  -scheduled duo-nebs  -received solumedrol in the ED  -IVF    2) Post Op Pain; pt very concerned about pain control.  Have d/w pt that this will be dictated by Dr. Angel as this is post-operative pain; pt does see pain mgmt for lumbar pain.  Will treat w/ low dose morphine in addition to home norco.  -s/p hiatal hernia repair with mesh by Dr. Angel  -repeat CT of abdomen and pelvis shows postoperative changes consistent with hiatal hernia replair.  There is a small amount of low density surrounding the operative site presumable representing postoperative change.   -will consult Dr. Angel in am   -for now will continue home norco, he did receive 8 mg of morphine in the ED    3) ADD  -on adderall    4) Anxiety/depression  -continue wellbutrin    5) Normocytic anemia  -appears  at baseline  -will check anemia studies    6)HERNAN  -on CPAP at HS     DVT prophylaxis:scds    CODE STATUS:  Full code   There are no questions and answers to display.       Admission Status:  I believe this patient meets OBSERVATION status, however if further evaluation or treatment plans warrant, status may change.  Based upon current information, I predict patient's care encounter to be less than or equal to 2 midnights.     DARVIN Thompson   12/18/19   7:27 PM

## 2019-12-19 NOTE — ANESTHESIA POSTPROCEDURE EVALUATION
Patient: Sergio Lucero    Procedure Summary     Date:  12/19/19 Room / Location:   [facility]    Anesthesia Start:  1759 Anesthesia Stop:  1837    Procedure:  ESOPHAGOGASTRODUODENOSCOPY WITH PYLORIC DILATATION (N/A ) Diagnosis:       Gastric distention      (gastric distention)    Surgeon:  Sky Angel MD Provider:  Darshana Montano MD    Anesthesia Type:  general ASA Status:  3          Anesthesia Type: general    Vitals  Vitals Value Taken Time   /87 12/19/2019  6:35 PM   Temp 98 °F (36.7 °C) 12/19/2019  6:35 PM   Pulse 92 12/19/2019  6:37 PM   Resp 16 12/19/2019  6:35 PM   SpO2 98 % 12/19/2019  6:37 PM   Vitals shown include unvalidated device data.        Post Anesthesia Care and Evaluation    Patient location during evaluation: PACU  Patient participation: complete - patient participated  Level of consciousness: awake  Pain score: 0  Pain management: adequate  Airway patency: patent  Anesthetic complications: No anesthetic complications  PONV Status: none  Cardiovascular status: acceptable and hemodynamically stable  Respiratory status: acceptable, nasal cannula, nonlabored ventilation and spontaneous ventilation  Hydration status: acceptable    Comments: No apparent anesthesia complications noted at this time

## 2019-12-19 NOTE — CONSULTS
Cc:  Abdominal pain    HPI: 34-year-old morbidly obese white male known to me status POD#3 uneventful laparoscopic reduction of his intrathoracic stomach with type IV paraesophageal hiatal hernia, repair of his hernia reinforced with Phasix mesh, and Nissen fundoplication.  He did have an anesthesia tap block at the time.  He takes Lortab 10 daily at home.  On postoperative day #1 he was having quite a bit of pain requiring a lot of IV narcotics but after evaluation he felt he would be fine at home and was discharged with additional Lortab.  He took one that evening and another in the morning and called the office complaining of severe unrelenting pain and went to the emergency room where he was admitted to the hospitalist service after CT scan showed left lower lobe pneumonia.  He denies any shortness of breath or inspiratory chest pain.  He says he was running a low-grade fever.  He says his abdominal pain is in the left subcostal region radiating to the mid axillary line and he feels it is just underneath the skin.  He says it does not feel like a deep pain.  He says he had some mild nausea but no emesis or retching episodes.  He does feel very full.  He says he has been passing gas and having bowel movements.  He says the pain is not continuous.  He is ambulating and voiding well and has no other complaints.    Past Medical History:   Diagnosis Date   • Acid reflux     • ADHD (attention deficit hyperactivity disorder)     • Anemia     • Anxiety     • Depression     • Fatty liver     • History of ankle fracture       RIGHT   • History of arm fracture       BOTH at separate times   • Insomnia     • Lumbar disc herniation       L4-L5   • Obstructive sleep apnea       on CPAP         Surgical History         Past Surgical History:   Procedure Laterality Date   • ADENOIDECTOMY   2009   • KNEE ARTHROSCOPY Right 2009   • TONSILLECTOMY   2009   • WISDOM TOOTH EXTRACTION   1998       Lap reduction/repair Type IV  paraesophageal hernia with mesh, Nissen fundoplication 12/16/19          Allergies   Allergen Reactions   • Fish-Derived Products Anaphylaxis   • Nuts Anaphylaxis   • Shellfish Allergy Anaphylaxis         Current Outpatient Medications:   •  amphetamine-dextroamphetamine (ADDERALL) 20 MG tablet, Take 1 tablet by mouth Daily., Disp: 30 tablet, Rfl: 0  •  amphetamine-dextroamphetamine XR (ADDERALL XR) 30 MG 24 hr capsule, Take 1 capsule by mouth Every Morning, Disp: 30 capsule, Rfl: 0  •  buPROPion XL (WELLBUTRIN XL) 300 MG 24 hr tablet, Take 1 tablet by mouth Every Morning., Disp: 30 tablet, Rfl: 2  •  HYDROcodone-acetaminophen (NORCO)  MG per tablet, Take 2 tablets by mouth As Needed., Disp: , Rfl:   •  pantoprazole (PROTONIX) 40 MG EC tablet, Take 1 tablet by mouth 30 minutes before breakfast daily., Disp: 90 tablet, Rfl: 3  •  zolpidem (AMBIEN) 10 MG tablet, Take 1 tablet by mouth every night at bedtime., Disp: 30 tablet, Rfl: 0     Social History   Social History            Socioeconomic History   • Marital status:        Spouse name: Not on file   • Number of children: 4   • Years of education: Not on file   • Highest education level: Not on file   Tobacco Use   • Smoking status: Never Smoker   • Smokeless tobacco: Never Used   Substance and Sexual Activity   • Alcohol use: Yes       Alcohol/week: 2.4 oz       Types: 4 Cans of beer per week       Frequency: 4 or more times a week   • Drug use: No   • Sexual activity: Yes       Partners: Female      He lives in [place], has 4 kids is  and is in law school.  No tobacco use, moderate alcohol use usually 3-4 beers a week.        Family History   Problem Relation Age of Onset   • Heart attack Father 56   • Obesity Father     • Cancer Mother     • Obesity Mother     • No Known Problems Daughter     • No Known Problems Son     • No Known Problems Maternal Grandmother     • No Known Problems Maternal Grandfather     • No Known Problems Paternal  Grandmother     • No Known Problems Paternal Grandfather      ROS o/w neg    On exam he is in no apparent distress, quickly got out of bed to shake my hand.  Temperature 98.1 pulse 80 respirations 16 blood pressure 126/68 no scleral icterus.  Abdomen is soft, minimal if any tenderness even to deep palpation.  Nondistended.  Bowel sounds active.  Wounds healing nicely.  No ecchymosis.    White blood count normal at 9.6 with 82 segs 7 lymphs 10 monocytes no bands.  H&H 8.6 and 28.8.  BMP normal except for glucose of 119 pro calcitonin is decreased at 0.07.  Lactate is 1.6.  Admission CMP normal except for glucose 103 CO2 30 AST 44.  Lipase was 30 admission white count 8 with 65 segs 17 lymphs 16 monocytes H&H 8.9 and 31.1 postoperative day #1 H&H 8.7 and 28.5 later that afternoon 9 and 30.9.    CT scan reviewed.  There appeared to be some postoperative changes in the mediastinum as expected.  Left lower lobe pneumonia.  Not mentioned in the report but there appears to be significant gastric distention.    Impression: Left abdominal pain and left lower lobe pneumonia 3 days status post laparoscopic reduction of intrathoracic stomach with paraesophageal hiatal hernia repair with mesh and Nissen fundoplication.  Pain is not continuous and there is minimal tenderness on exam.  He feels much better since admission.  Not mentioned on CAT scan but of concern is gastric distention which seems symptomatic as he has nausea and fullness.  He did have some of his liquid breakfast tray but says he felt full.  No signs of sepsis clinically.  Normal white count.  Normal lactate and procalcitonin.  Pneumonia seems asymptomatic.    Plan: Appreciate internal medicine assistance with his pneumonia and other medical issues.  Risks, benefits, and alternative therapies were discussed and he wishes to proceed with diagnostic possible therapeutic upper endoscopy later today.  Thank you for the opportunity evaluate Mr. Lucero

## 2019-12-19 NOTE — PROGRESS NOTES
Discharge Planning Assessment  [facility]     Patient Name: Sergio Lucero  MRN: [id number]  Today's Date: 12/18/2019    Admit Date: 12/18/2019    Discharge Needs Assessment     Row Name 12/18/19 1917       Living Environment    Lives With  spouse pt resides in [facility]    Name(s) of Who Lives With Patient  Naeem    Current Living Arrangements  home/apartment/condo    Primary Care Provided by  self    Provides Primary Care For  no one    Family Caregiver if Needed  spouse    Family Caregiver Names  Layne    Quality of Family Relationships  helpful;involved;supportive    Able to Return to Prior Arrangements  yes       Resource/Environmental Concerns    Resource/Environmental Concerns  none       Transition Planning    Patient/Family Anticipates Transition to  home with family    Patient/Family Anticipated Services at Transition  none    Transportation Anticipated  family or friend will provide       Discharge Needs Assessment    Readmission Within the Last 30 Days  no previous admission in last 30 days    Concerns to be Addressed  denies needs/concerns at this time    Equipment Currently Used at Home  bipap/cpap Pt has CPAP machine    Anticipated Changes Related to Illness  none    Equipment Needed After Discharge  none        Discharge Plan     Row Name 12/18/19 1926       Plan    Plan  home    Provided post acute provider list?  -- na    Patient/Family in Agreement with Plan  yes    Plan Comments  CM spoke with pt at bedside. Pt resides in [facility] with his wife. Pt is independent of adls and has a CPAP machine. Pt is not current with home health or outpt services. Pt plans to return home and denies needs at this time. CM will continue to follow.     Final Discharge Disposition Code  01 - home or self-care        Destination      Coordination has not been started for this encounter.      Durable Medical Equipment      Coordination has not been started for this encounter.      Dialysis/Infusion       Coordination has not been started for this encounter.      Home Medical Care      Coordination has not been started for this encounter.      Therapy      Coordination has not been started for this encounter.      Community Resources      Coordination has not been started for this encounter.          Demographic Summary     Row Name 12/18/19 1906       General Information    Referral Source  admission list;emergency department    Reason for Consult  discharge planning    Preferred Language  English    General Information Comments  PCP- Noelle Roberts       Contact Information    Permission Granted to Share Info With          Functional Status     Row Name 12/18/19 1907       Functional Status    Usual Activity Tolerance  good    Current Activity Tolerance  moderate       Functional Status, IADL    Medications  independent    Meal Preparation  independent    Housekeeping  independent    Laundry  independent    Shopping  independent       Employment/    Employment/ Comments  Pt confirms he has Passport insurance and denies concerns or disruption in coverage. P thas prescription drug coverage and denies issues obtaining or affording current medications        Psychosocial    No documentation.       Abuse/Neglect    No documentation.       Legal    No documentation.       Substance Abuse    No documentation.       Patient Forms    No documentation.           Jessenia Morgan RN

## 2019-12-19 NOTE — ANESTHESIA PROCEDURE NOTES
Airway  Date/Time: 12/19/2019 6:05 PM  Airway not difficult    General Information and Staff    Patient location during procedure: OR  Anesthesiologist: Darshana Montano MD    Indications and Patient Condition  Indications for airway management: airway protection    Preoxygenated: yes  Mask difficulty assessment: 0 - not attempted    Final Airway Details  Final airway type: endotracheal airway      Successful airway: ETT  Cuffed: yes   Successful intubation technique: direct laryngoscopy  Facilitating devices/methods: intubating stylet and cricoid pressure  Endotracheal tube insertion site: oral  Blade: Mirtha  Blade size: 3  ETT size (mm): 7.5  Cormack-Lehane Classification: grade I - full view of glottis  Placement verified by: chest auscultation and capnometry   Cuff volume (mL): 8  Measured from: teeth  ETT/EBT  to teeth (cm): 22  Number of attempts at approach: 1  Assessment: lips, teeth, and gum same as pre-op and atraumatic intubation

## 2019-12-19 NOTE — PLAN OF CARE
Problem: Patient Care Overview  Goal: Plan of Care Review  Outcome: Ongoing (interventions implemented as appropriate)  Flowsheets (Taken 12/18/2019 2005)  Progress: no change  Plan of Care Reviewed With: patient  Outcome Summary: Pt admitted to unit.     Problem: Pain, Chronic (Adult)  Goal: Acceptable Pain/Comfort Level and Functional Ability  Outcome: Ongoing (interventions implemented as appropriate)  Flowsheets (Taken 12/18/2019 2005)  Acceptable Pain/Comfort Level and Functional Ability: making progress toward outcome     Problem: Pneumonia (Adult)  Goal: Signs and Symptoms of Listed Potential Problems Will be Absent, Minimized or Managed (Pneumonia)  Outcome: Ongoing (interventions implemented as appropriate)  Flowsheets (Taken 12/18/2019 2005)  Problems Assessed (Pneumonia): all  Problems Present (Pneumonia): infection progression

## 2019-12-19 NOTE — PROGRESS NOTES
Continued Stay Note  [facility]     Patient Name: Sergio Lucero  MRN: [id number]  Today's Date: 12/19/2019    Admit Date: 12/18/2019    Discharge Plan     Row Name 12/19/19 1540       Plan    Plan  Plan is home    Patient/Family in Agreement with Plan  yes    Plan Comments  Met with patient in the room.  States feels ok.  Independent.  Plan is home.  Following      Final Discharge Disposition Code  01 - home or self-care        Discharge Codes    No documentation.             Judy Cox RN

## 2019-12-19 NOTE — ANESTHESIA PREPROCEDURE EVALUATION
Anesthesia Evaluation     Patient summary reviewed and Nursing notes reviewed   NPO Solid Status: > 8 hours  NPO Liquid Status: > 8 hours           Airway   Mallampati: II  TM distance: >3 FB  Neck ROM: limited  No difficulty expected  Dental      Pulmonary    (+) pneumonia (LLL pneumonia ?recent CXR) stable , sleep apnea,   (-) not a smoker  Cardiovascular         Neuro/Psych  (+) psychiatric history (adderall buprion ),       ROS Comment: LBP wo sciatica   GI/Hepatic/Renal/Endo    (+) morbid obesity, hiatal hernia, GERD,  liver disease,     Musculoskeletal     Abdominal    Substance History      OB/GYN          Other   arthritis,          Phys Exam Other: Mac 3 grade I view 12/19 Waits               Anesthesia Plan    ASA 3     general   Rapid sequence  intravenous induction     Anesthetic plan, all risks, benefits, and alternatives have been provided, discussed and informed consent has been obtained with: patient.    Plan discussed with CRNA.

## 2019-12-19 NOTE — PLAN OF CARE
Problem: Patient Care Overview  Goal: Plan of Care Review  Outcome: Ongoing (interventions implemented as appropriate)  Flowsheets (Taken 12/19/2019 0859)  Progress: no change  Plan of Care Reviewed With: patient  Outcome Summary: going for scope today per Dr Angel     Problem: Patient Care Overview  Goal: Individualization and Mutuality  Outcome: Ongoing (interventions implemented as appropriate)  Flowsheets (Taken 12/19/2019 0859)  Patient Specific Goals (Include Timeframe): monitor pain q2h and prn     Problem: Pain, Chronic (Adult)  Goal: Acceptable Pain/Comfort Level and Functional Ability  Outcome: Ongoing (interventions implemented as appropriate)  Flowsheets (Taken 12/19/2019 0859)  Acceptable Pain/Comfort Level and Functional Ability: making progress toward outcome

## 2019-12-20 ENCOUNTER — APPOINTMENT (OUTPATIENT)
Dept: GENERAL RADIOLOGY | Facility: HOSPITAL | Age: 34
End: 2019-12-20

## 2019-12-20 LAB
FOLATE BLD-MCNC: 475 NG/ML
FOLATE RBC-MCNC: 1721 NG/ML
HCT VFR BLD AUTO: 27.6 % (ref 37.5–51)

## 2019-12-20 PROCEDURE — 63710000001 PANTOPRAZOLE 40 MG TABLET DELAYED-RELEASE: Performed by: PHYSICIAN ASSISTANT

## 2019-12-20 PROCEDURE — 99024 POSTOP FOLLOW-UP VISIT: CPT | Performed by: SURGERY

## 2019-12-20 PROCEDURE — 63710000001 LORAZEPAM 1 MG TABLET: Performed by: PHYSICIAN ASSISTANT

## 2019-12-20 PROCEDURE — A9270 NON-COVERED ITEM OR SERVICE: HCPCS | Performed by: PHYSICIAN ASSISTANT

## 2019-12-20 PROCEDURE — A9270 NON-COVERED ITEM OR SERVICE: HCPCS | Performed by: INTERNAL MEDICINE

## 2019-12-20 PROCEDURE — 63710000001 BARIUM SULFATE 96 % RECONSTITUTED SUSPENSION: Performed by: PHYSICIAN ASSISTANT

## 2019-12-20 PROCEDURE — 63710000001 LEVOFLOXACIN 500 MG TABLET: Performed by: INTERNAL MEDICINE

## 2019-12-20 PROCEDURE — 63710000001 BUPROPION XL 150 MG TABLET SUSTAINED-RELEASE 24 HOUR: Performed by: PHYSICIAN ASSISTANT

## 2019-12-20 PROCEDURE — 25010000002 PIPERACILLIN SOD-TAZOBACTAM PER 1 G: Performed by: PHYSICIAN ASSISTANT

## 2019-12-20 PROCEDURE — A9270 NON-COVERED ITEM OR SERVICE: HCPCS | Performed by: SURGERY

## 2019-12-20 PROCEDURE — 63710000001 ZOLPIDEM 5 MG TABLET: Performed by: PHYSICIAN ASSISTANT

## 2019-12-20 PROCEDURE — 74241: CPT

## 2019-12-20 PROCEDURE — 63710000001 OXYCODONE-ACETAMINOPHEN 10-325 MG TABLET: Performed by: PHYSICIAN ASSISTANT

## 2019-12-20 PROCEDURE — 25010000002 MORPHINE PER 10 MG: Performed by: PHYSICIAN ASSISTANT

## 2019-12-20 PROCEDURE — 63710000001 SIMETHICONE 80 MG CHEWABLE TABLET: Performed by: SURGERY

## 2019-12-20 PROCEDURE — 63710000001 SIMETHICONE 80 MG CHEWABLE TABLET: Performed by: INTERNAL MEDICINE

## 2019-12-20 PROCEDURE — G0378 HOSPITAL OBSERVATION PER HR: HCPCS

## 2019-12-20 PROCEDURE — 99217 PR OBSERVATION CARE DISCHARGE MANAGEMENT: CPT | Performed by: INTERNAL MEDICINE

## 2019-12-20 PROCEDURE — 25010000002 METOCLOPRAMIDE PER 10 MG: Performed by: INTERNAL MEDICINE

## 2019-12-20 RX ORDER — SIMETHICONE 80 MG
120 TABLET,CHEWABLE ORAL
Status: DISCONTINUED | OUTPATIENT
Start: 2019-12-20 | End: 2019-12-21 | Stop reason: HOSPADM

## 2019-12-20 RX ORDER — SIMETHICONE 80 MG
80 TABLET,CHEWABLE ORAL EVERY 6 HOURS PRN
Qty: 120 TABLET | Refills: 0 | Status: SHIPPED | OUTPATIENT
Start: 2019-12-20 | End: 2020-04-24

## 2019-12-20 RX ORDER — SIMETHICONE 80 MG
80 TABLET,CHEWABLE ORAL 4 TIMES DAILY PRN
Status: DISCONTINUED | OUTPATIENT
Start: 2019-12-20 | End: 2019-12-21 | Stop reason: HOSPADM

## 2019-12-20 RX ORDER — LEVOFLOXACIN 500 MG/1
500 TABLET, FILM COATED ORAL DAILY
Status: DISCONTINUED | OUTPATIENT
Start: 2019-12-20 | End: 2019-12-21 | Stop reason: HOSPADM

## 2019-12-20 RX ORDER — LEVOFLOXACIN 500 MG/1
500 TABLET, FILM COATED ORAL DAILY
Qty: 3 TABLET | Refills: 0 | Status: SHIPPED | OUTPATIENT
Start: 2019-12-21 | End: 2019-12-24

## 2019-12-20 RX ORDER — METOCLOPRAMIDE HYDROCHLORIDE 5 MG/ML
10 INJECTION INTRAMUSCULAR; INTRAVENOUS ONCE
Status: DISCONTINUED | OUTPATIENT
Start: 2019-12-20 | End: 2019-12-20

## 2019-12-20 RX ORDER — METOCLOPRAMIDE 10 MG/1
10 TABLET ORAL
Qty: 120 TABLET | Refills: 0 | Status: SHIPPED | OUTPATIENT
Start: 2019-12-20 | End: 2019-12-21 | Stop reason: SDUPTHER

## 2019-12-20 RX ORDER — MORPHINE SULFATE 2 MG/ML
2 INJECTION, SOLUTION INTRAMUSCULAR; INTRAVENOUS EVERY 4 HOURS PRN
Status: DISCONTINUED | OUTPATIENT
Start: 2019-12-20 | End: 2019-12-21 | Stop reason: HOSPADM

## 2019-12-20 RX ORDER — SIMETHICONE 80 MG
80 TABLET,CHEWABLE ORAL ONCE
Status: DISCONTINUED | OUTPATIENT
Start: 2019-12-20 | End: 2019-12-20

## 2019-12-20 RX ORDER — METOCLOPRAMIDE HYDROCHLORIDE 5 MG/ML
10 INJECTION INTRAMUSCULAR; INTRAVENOUS
Status: DISCONTINUED | OUTPATIENT
Start: 2019-12-20 | End: 2019-12-21 | Stop reason: HOSPADM

## 2019-12-20 RX ORDER — BUPIVACAINE HYDROCHLORIDE AND EPINEPHRINE 5; 5 MG/ML; UG/ML
30 INJECTION, SOLUTION PERINEURAL ONCE
Status: COMPLETED | OUTPATIENT
Start: 2019-12-20 | End: 2019-12-20

## 2019-12-20 RX ORDER — OXYCODONE AND ACETAMINOPHEN 10; 325 MG/1; MG/1
1 TABLET ORAL EVERY 4 HOURS PRN
Qty: 18 TABLET | Refills: 0 | Status: SHIPPED | OUTPATIENT
Start: 2019-12-20 | End: 2019-12-28

## 2019-12-20 RX ADMIN — OXYCODONE HYDROCHLORIDE AND ACETAMINOPHEN 1 TABLET: 10; 325 TABLET ORAL at 00:54

## 2019-12-20 RX ADMIN — LEVOFLOXACIN 500 MG: 500 TABLET, FILM COATED ORAL at 11:18

## 2019-12-20 RX ADMIN — METOCLOPRAMIDE 10 MG: 5 INJECTION, SOLUTION INTRAMUSCULAR; INTRAVENOUS at 15:09

## 2019-12-20 RX ADMIN — OXYCODONE HYDROCHLORIDE AND ACETAMINOPHEN 1 TABLET: 10; 325 TABLET ORAL at 12:56

## 2019-12-20 RX ADMIN — BUPROPION HYDROCHLORIDE 300 MG: 150 TABLET, FILM COATED, EXTENDED RELEASE ORAL at 08:19

## 2019-12-20 RX ADMIN — SIMETHICONE CHEW TAB 80 MG 120 MG: 80 TABLET ORAL at 21:16

## 2019-12-20 RX ADMIN — BARIUM SULFATE 183 ML: 960 POWDER, FOR SUSPENSION ORAL at 10:50

## 2019-12-20 RX ADMIN — LORAZEPAM 1 MG: 1 TABLET ORAL at 21:16

## 2019-12-20 RX ADMIN — MORPHINE SULFATE 1 MG: 2 INJECTION, SOLUTION INTRAMUSCULAR; INTRAVENOUS at 04:25

## 2019-12-20 RX ADMIN — ZOLPIDEM TARTRATE 10 MG: 5 TABLET ORAL at 21:34

## 2019-12-20 RX ADMIN — OXYCODONE HYDROCHLORIDE AND ACETAMINOPHEN 1 TABLET: 10; 325 TABLET ORAL at 09:04

## 2019-12-20 RX ADMIN — OXYCODONE HYDROCHLORIDE AND ACETAMINOPHEN 1 TABLET: 10; 325 TABLET ORAL at 05:04

## 2019-12-20 RX ADMIN — SODIUM CHLORIDE, PRESERVATIVE FREE 10 ML: 5 INJECTION INTRAVENOUS at 21:16

## 2019-12-20 RX ADMIN — METOCLOPRAMIDE 10 MG: 5 INJECTION, SOLUTION INTRAMUSCULAR; INTRAVENOUS at 21:16

## 2019-12-20 RX ADMIN — PANTOPRAZOLE SODIUM 40 MG: 40 TABLET, DELAYED RELEASE ORAL at 08:19

## 2019-12-20 RX ADMIN — TAZOBACTAM SODIUM AND PIPERACILLIN SODIUM 3.38 G: 375; 3 INJECTION, SOLUTION INTRAVENOUS at 03:26

## 2019-12-20 RX ADMIN — SIMETHICONE CHEW TAB 80 MG 80 MG: 80 TABLET ORAL at 15:09

## 2019-12-20 RX ADMIN — BUPIVACAINE HYDROCHLORIDE AND EPINEPHRINE BITARTRATE 30 ML: 5; .0091 INJECTION, SOLUTION PERINEURAL at 15:27

## 2019-12-20 NOTE — PROGRESS NOTES
Cc: HD#2 Abd pain, want to go home    POD#5 Lap paraesophageal hiatal hernia repair with mesh and Nissen fundoplication    He is alone in the room and dressed and very anxious to go home.  Dr. Lr has been texting me trying to accommodate the patient's wishes.  The patient complains of incisional pain 1/2 inch below the skin in his left midclavicular line 11 mm trocar site incision.  No deep abdominal pain but he says he feels he needs to hiccup.  I did a trigger point of the incision with 20 cc of half percent Marcaine with epinephrine with complete resolution of the incisional pain.  No hunger.  He says he ate 3 bites of a cheeseburger because he was told he had to eat before he can go home.  I had a long discussion after endoscopy with his wife last night about the plan.  He says he talk to his wife at all she said was that I was going to repeat the endoscopy today which is not the case, I did order an upper GI like I discussed with her.  He admits that I told him to stay only on liquids but he says he double checked with everyone and it was okay to eat regular food.  The nurses say he is setting an alarm clock to get his narcotic every 4 hours.  I asked him if he is getting any IV narcotic and he said no.  The nurse said he got IV morphine at 430 less than 12 hours ago.  I confronted him with this and he says he will be fine at home without IV narcotic.  He said similar on postoperative day #1 and was back in the hospital less than 24 hours later.  He and I agreed that he needs to be off IV narcotic at least 24 hours before we could even consider contemplating discharge.  He also voiced understanding that his diet is liquid with protein shakes.  He says he is passing flatus, no bowel movement today but he has had bowel movement since surgery.  He is ambulating and voiding well.  According to the nursing notes noncompliant with recommended bed height.  Afebrile stable vital signs pulse 67 respirations 18  blood pressure 142/86 his abdomen is soft and benign.  Nontender to deep palpation.  Bowel sounds active.  Wounds healing nicely.  I reviewed his upper GI, the report is pending.  His stomach does appear dilated but the duodenal c-loop is well seen.      Impression: Incisional pain left upper quadrant resolved with trigger point injection as above.  He denies deep abdominal pain but is setting his alarm clock every 4 hours for oral narcotics as above and received IV narcotics less than 12 hours ago.  He seems very manipulative when it comes to trying to get discharged and somewhat evasive especially when it comes to narcotic issues.  He does appear to have a dilated stomach but no gastric outlet obstruction.    Plan: Full liquid diet with protein.  IV Reglan and oral simethicone.  I discussed the case verbally with Dr. Lr.  Once again as above consider discharge only after the patient demonstrates he can be off IV narcotics otherwise he will likely bounce right back again.  In addition, he should not require oral narcotics every 4 hours for standard laparoscopic postoperative pain 5 days after surgery.  CT scan was unremarkable and upper GI shows no obstruction.  Gastric distention may cause fullness but should not require this level of narcotic usage.  There was a lot of retained food on endoscopy but otherwise no abnormalities appreciated that would explain his degree of pain.  He seems to be in no apparent distress ambulating and conversant.

## 2019-12-20 NOTE — PROGRESS NOTES
Case Management Discharge Note      Final Note: Home    Provided post acute provider list?: (na)    Destination      No service has been selected for the patient.      Durable Medical Equipment      No service has been selected for the patient.      Dialysis/Infusion      No service has been selected for the patient.      Home Medical Care      No service has been selected for the patient.      Therapy      No service has been selected for the patient.      Community Resources      No service has been selected for the patient.             Final Discharge Disposition Code: 01 - home or self-care

## 2019-12-20 NOTE — DISCHARGE SUMMARY
[facility] Medicine Services  DISCHARGE SUMMARY    Patient Name: Sergio Lucero  : 1985  MRN: [id number]    Date of Admission: 2019  2:02 PM  Date of Discharge:  2019  Primary Care Physician: Noelle Roberts MD    Consults     Date and Time Order Name Status Description    2019 0030 Inpatient Bariatric Surgery Consult Completed           Hospital Course     Presenting Problem:   Pneumonia of left lower lobe due to infectious organism (CMS/HCC) [J18.1]    Active Hospital Problems    Diagnosis  POA   • **HCAP (healthcare-associated pneumonia) [J18.9]  Yes   • Post-op pain [G89.18]  Yes   • Pneumonia of left lower lobe due to infectious organism (CMS/HCC) [J18.1]  Yes   • Chronic pain disorder [G89.4]  Yes   • Attention deficit disorder (ADD) without hyperactivity [F98.8]  Yes   • Anxiety with depression [F41.8]  Yes   • Obstructive sleep apnea [G47.33]  Yes      Resolved Hospital Problems   No resolved problems to display.          Hospital Course:  Sergio Lucero is a 34 year old s/p hiatal hernia repair 2019 presents to the ED with worsening pain at his surgical site. Incidentally LLL pneumonia was identified on CT of abdomen and pelvis. This is my first day assessing patient's active medical issues.     A/P:      Early LLL bacterial pneumonia.  -Blood cultures NGTD. Initially placed on IV zosyn to which he responded well. Transition to PO levaquin for 4 more days at d/c.     Post Op Pain s/p hiatal hernia repair with mesh by Dr. Agnel  Gastric distension  -Dr. Angel has seen. Underwent EDG with balloon dilation of pylorus which he tolerated well. Underwent subsequent barium swallow showing persistent distension. I spoke with Dr. Angel he recommended d/c on CLD w/ zofran and simethicone w/ close follow up.    Discharge Follow Up Recommendations for labs/diagnostics:   Dr. Angel in 1-2 weeks.    Day of Discharge     HPI: Up in bed. Pain  controlled. Looking forward to going home.    Review of Systems  Gen- No fevers, chills  CV- No chest pain, palpitations  Resp- No cough, dyspnea  GI- No N/V/D, abd pain    Otherwise ROS is negative except as mentioned in the HPI.    Vital Signs:   Temp:  [97.8 °F (36.6 °C)-98.4 °F (36.9 °C)] 98.4 °F (36.9 °C)  Heart Rate:  [67-88] 67  Resp:  [11-18] 18  BP: (130-142)/(73-89) 142/86     Physical Exam:  Constitutional: No acute distress, awake, alert  HENT: NCAT, mucous membranes moist  Respiratory: Clear to auscultation bilaterally, respiratory effort normal   Cardiovascular: RRR, no murmurs, rubs, or gallops, palpable pedal pulses bilaterally  Gastrointestinal: Positive bowel sounds, soft, nontender, nondistended  Musculoskeletal: No bilateral ankle edema  Psychiatric: Appropriate affect, cooperative  Neurologic: Oriented x 3, strength symmetric in all extremities, Cranial Nerves grossly intact to confrontation, speech clear  Skin: No rashes    Pertinent  and/or Most Recent Results     Results from last 7 days   Lab Units 12/19/19  0456 12/18/19  1438 12/17/19  1422 12/17/19  0509 12/15/19  1641   WBC 10*3/mm3 9.57 8.08  --  9.71 7.06   HEMOGLOBIN g/dL 8.6* 8.9* 9.0* 8.7* 10.4*   HEMATOCRIT % 28.8* 31.1* 30.9* 28.5* 35.4*   PLATELETS 10*3/mm3 385 358  --  368 392   SODIUM mmol/L 136 138  --  138  --    POTASSIUM mmol/L 4.5 4.1  --  4.6  --    CHLORIDE mmol/L 98 99  --  101  --    CO2 mmol/L 28.0 30.0*  --  26.0  --    BUN mg/dL 16 13  --  11  --    CREATININE mg/dL 0.88 0.87  --  0.87  --    GLUCOSE mg/dL 119* 103*  --  111*  --    CALCIUM mg/dL 8.9 9.1  --  9.1  --      Results from last 7 days   Lab Units 12/18/19  1438 12/17/19  0509   BILIRUBIN mg/dL 0.3 0.3   ALK PHOS U/L 77 50   ALT (SGPT) U/L 31 16   AST (SGOT) U/L 44* 20           Invalid input(s): TG, LDLCALC, LDLREALC  Results from last 7 days   Lab Units 12/19/19  0456 12/18/19  1739 12/15/19  1643   HEMOGLOBIN A1C %  --   --  5.50   PROCALCITONIN ng/mL  0.07*  --   --    LACTATE mmol/L  --  1.6  --        Brief Urine Lab Results     None          Microbiology Results Abnormal     Procedure Component Value - Date/Time    Blood Culture - Blood, Arm, Left [[id number]] Collected:  12/18/19 1730    Lab Status:  Preliminary result Specimen:  Blood from Arm, Left Updated:  12/19/19 1815     Blood Culture No growth at 24 hours    Blood Culture - Blood, Arm, Right [[id number]] Collected:  12/18/19 1740    Lab Status:  Preliminary result Specimen:  Blood from Arm, Right Updated:  12/19/19 1815     Blood Culture No growth at 24 hours    S. Pneumo Ag Urine or CSF - Urine, Urine, Clean Catch [[id number]]  (Normal) Collected:  12/18/19 2050    Lab Status:  Final result Specimen:  Urine, Clean Catch Updated:  12/19/19 0709     Strep Pneumo Ag Negative    Legionella Antigen, Urine - Urine, Urine, Clean Catch [[id number]]  (Normal) Collected:  12/18/19 2050    Lab Status:  Final result Specimen:  Urine, Clean Catch Updated:  12/19/19 0709     LEGIONELLA ANTIGEN, URINE Negative          Imaging Results (All)     Procedure Component Value Units Date/Time    FL Upper GI Single Contrast With KUB [[id number]] Resulted:  12/20/19 1023     Updated:  12/20/19 1050    XR Chest 2 View [[id number]] Collected:  12/18/19 1736     Updated:  12/19/19 1227    Narrative:          EXAMINATION: XR CHEST 2 VW - 12/18/2019     INDICATION:  J18.1-Lobar pneumonia, unspecified organism;  J18.9-Pneumonia, unspecified organism; L55-Pyjrshghal condition;  G89.18-Other acute postprocedural pain; Z87.19-Personal history of other  diseases of the digestive system; G89.4-Chronic pain syndrome      COMPARISON: None     FINDINGS: Cardiac size borderline enlarged with opacifications of the  left lung base blunting the left lateral costophrenic sulcus and left  hemidiaphragm margins of atelectasis versus airspace disease without  large effusion or overt edema. No pneumothorax.           Impression:       Left lower lobe  opacifications consistent with airspace  disease seen on recent CT however no large effusion.     DICTATED:   12/18/2019  EDITED/ls :   12/18/2019      This report was finalized on 12/19/2019 12:24 PM by Dr. Pierce Dowling.       CT Abdomen Pelvis With Contrast [[id number]] Collected:  12/18/19 1556     Updated:  12/19/19 0835    Narrative:       EXAMINATION: CT ABDOMEN/PELVIS WITH CONTRAST - 12/18/2019     INDICATION: Post-op fever and nausea.     TECHNIQUE: CT abdomen and pelvis performed following intravenous  contrast.     The radiation dose reduction device was turned on for each scan per the  ALARA (As Low as Reasonably Achievable) protocol.     COMPARISON: None     FINDINGS: The most superior images demonstrate left lower lobe airspace  disease with air bronchograms. The liver and spleen are normal. There is  no adrenal or pancreatic mass. There is no renal mass, stone or  obstruction and  there is no ascites or retroperitoneal lymphadenopathy.  There is no pelvic mass. There is a small amount of pelvic fluid. There  is no inguinal lymphadenopathy. The postoperative changes consistent  with a hiatal hernia repair. There is low-density somewhat surrounding  the operative site presumably representing postoperative change.       Impression:       1. Left lower lobe pneumonia.  2. Postoperative changes consistent with hiatal hernia repair. There is  a small amount of low density surrounding the operative site presumably  representing postoperative change.     DICTATED:   12/18/2019  EDITED/ls :   12/18/2019         This report was finalized on 12/19/2019 8:32 AM by Dr. Tree Asif MD.                             Order Current Status    B-12 Binding Capacity In process    Folate RBC In process    Blood Culture - Blood, Arm, Left Preliminary result    Blood Culture - Blood, Arm, Right Preliminary result        Discharge Details        Discharge Medications      New Medications      Instructions Start Date    levoFLOXacin 500 MG tablet  Commonly known as:  LEVAQUIN   500 mg, Oral, Daily   Start Date:  December 21, 2019     metoclopramide 10 MG tablet  Commonly known as:  REGLAN   10 mg, Oral, 4 Times Daily Before Meals & Nightly      oxyCODONE-acetaminophen  MG per tablet  Commonly known as:  PERCOCET   1 tablet, Oral, Every 4 Hours PRN      simethicone 80 MG chewable tablet  Commonly known as:  GAS-X   80 mg, Oral, Every 6 Hours PRN         Changes to Medications      Instructions Start Date   pantoprazole 40 MG EC tablet  Commonly known as:  PROTONIX  What changed:    · how much to take  · how to take this  · when to take this   Take 1 tablet by mouth 30 minutes before breakfast daily.         Continue These Medications      Instructions Start Date   amphetamine-dextroamphetamine 20 MG tablet  Commonly known as:  ADDERALL   20 mg, Oral, Daily      amphetamine-dextroamphetamine XR 30 MG 24 hr capsule  Commonly known as:  ADDERALL XR   30 mg, Oral, Every Morning      buPROPion  MG 24 hr tablet  Commonly known as:  WELLBUTRIN XL   300 mg, Oral, Every Morning      zolpidem 10 MG tablet  Commonly known as:  AMBIEN   10 mg, Oral, Every Night at Bedtime         Stop These Medications    HYDROcodone-acetaminophen  MG per tablet  Commonly known as:  NORCO            Allergies   Allergen Reactions   • Fish-Derived Products Anaphylaxis   • Nuts Anaphylaxis   • Other Anaphylaxis     All Beans (mahajan, lima)   • Shellfish Allergy Anaphylaxis   • Iodine Hives     itching         Discharge Disposition:  Home or Self Care    Diet:  Hospital:  Diet Order   Procedures   • Diet Clear Liquid       Activity: As tolerated      Restrictions or Other Recommendations: Continue clear liquid diet until directed otherwise by Dr. Angel.       CODE STATUS:    Code Status and Medical Interventions:   Ordered at: 12/18/19 1941     Level Of Support Discussed With:    Patient     Code Status:    CPR     Medical Interventions (Level  of Support Prior to Arrest):    Full       Future Appointments   Date Time Provider Department Center   12/30/2019 12:00 PM Cris Tom PA [facility] None       Additional Instructions for the Follow-ups that You Need to Schedule     Discharge Follow-up with Specialty: Dr. Angel; 1 Week   As directed      Specialty:  Dr. Angel    Follow Up:  1 Week               Time Spent on Discharge: 45 minutes    Electronically signed by Sujey Lr II, DO, 12/20/19, 1:23 PM.

## 2019-12-20 NOTE — PLAN OF CARE
Problem: Patient Care Overview  Goal: Plan of Care Review  Outcome: Ongoing (interventions implemented as appropriate)  Flowsheets (Taken 12/20/2019 1005)  Progress: improving  Plan of Care Reviewed With: patient  Outcome Summary: Going for UGI at this time, if ok with Dr Angel, Dr Lr will discharge today     Problem: Patient Care Overview  Goal: Individualization and Mutuality  Outcome: Ongoing (interventions implemented as appropriate)  Flowsheets (Taken 12/19/2019 3816)  Patient Specific Goals (Include Timeframe): monitor pain q2h and prn     Problem: Pain, Chronic (Adult)  Goal: Acceptable Pain/Comfort Level and Functional Ability  Outcome: Ongoing (interventions implemented as appropriate)  Flowsheets (Taken 12/20/2019 0531 by Radha Ricci, RN)  Acceptable Pain/Comfort Level and Functional Ability: making progress toward outcome     Problem: Pneumonia (Adult)  Goal: Signs and Symptoms of Listed Potential Problems Will be Absent, Minimized or Managed (Pneumonia)  Outcome: Ongoing (interventions implemented as appropriate)  Flowsheets (Taken 12/20/2019 1005)  Problems Assessed (Pneumonia): all  Problems Present (Pneumonia): none

## 2019-12-20 NOTE — SIGNIFICANT NOTE
Pt's wife upset about pt being a privacy pt.  Wife wants all information and access to pt.  Charge RN explained even if pt not privacy we will not tell information without pt consent.  Néstor Rn explained that if anyone including wife calls to ask for pt as he is a privacy pt we will not be able to confirm he is here.  Pt and wife wanted to stay privacy but give access to certain people.  Néstor RN explained that is not an option as we can't pick and chose with privacy pt.  Pt decided to stay a privacy pt at this time.

## 2019-12-20 NOTE — NURSING NOTE
Nurse spoke to patient multiple times about changing the height of his bed. Educated about the safety issues of his bed not being low to the ground. Nurse still finds bed elevation changed.

## 2019-12-20 NOTE — ANESTHESIA POSTPROCEDURE EVALUATION
Patient: Sergio Lucero    Procedure Summary     Date:  12/16/19 [facility] / [facility]:   [facility]    Anesthesia Start:  0734 Anesthesia Stop:  1047    Procedures:       PARAESOPHAGEAL HERNIA REPAIR LAPAROSCOPIC WITH MESH (N/A Abdomen)      NISSEN FUNDOPLICATION LAPAROSCOPIC (N/A Abdomen) Diagnosis:       Paraesophageal hiatal hernia      (Paraesophageal hiatal hernia [K44.9])    Surgeon:  Sky Angel MD Provider:  Byron Palomares MD    Anesthesia Type:  general ASA Status:  3          Anesthesia Type: general    Vitals  Vitals Value Taken Time   /84 12/16/2019 11:45 AM   Temp 98.3 °F (36.8 °C) 12/16/2019 11:45 AM   Pulse 77 12/16/2019 11:45 AM   Resp 16 12/16/2019 11:45 AM   SpO2 94 % 12/16/2019 11:45 AM           Post Anesthesia Care and Evaluation    Patient location during evaluation: PACU  Patient participation: complete - patient participated  Level of consciousness: awake and alert  Pain score: 0  Pain management: adequate  Airway patency: patent  Anesthetic complications: No anesthetic complications  PONV Status: none  Cardiovascular status: hemodynamically stable and acceptable  Respiratory status: nonlabored ventilation, acceptable and nasal cannula  Hydration status: acceptable

## 2019-12-20 NOTE — PLAN OF CARE
Problem: Patient Care Overview  Goal: Plan of Care Review  Outcome: Ongoing (interventions implemented as appropriate)  Flowsheets  Taken 12/20/2019 0531 by Radha Ricci RN  Progress: no change  Outcome Summary: A/Ox4 and VSS. Pt continues to have c/o post-op pain and requests prn pain medication frequently. Pt states no BM since 11/16/19, and has been NPO since 12/20/19@0000 for upper GI today. Will continue to monitor.  Taken 12/19/2019 1602 by Chris Finch RN  Plan of Care Reviewed With: patient     Problem: Pain, Chronic (Adult)  Goal: Acceptable Pain/Comfort Level and Functional Ability  Outcome: Ongoing (interventions implemented as appropriate)  Flowsheets (Taken 12/20/2019 0531)  Acceptable Pain/Comfort Level and Functional Ability: making progress toward outcome     Problem: Pneumonia (Adult)  Goal: Signs and Symptoms of Listed Potential Problems Will be Absent, Minimized or Managed (Pneumonia)  Outcome: Ongoing (interventions implemented as appropriate)  Flowsheets (Taken 12/19/2019 0859 by Payal Lechuga RN)  Problems Assessed (Pneumonia): all  Problems Present (Pneumonia): none

## 2019-12-21 ENCOUNTER — APPOINTMENT (OUTPATIENT)
Dept: GENERAL RADIOLOGY | Facility: HOSPITAL | Age: 34
End: 2019-12-21

## 2019-12-21 VITALS
RESPIRATION RATE: 16 BRPM | HEART RATE: 94 BPM | DIASTOLIC BLOOD PRESSURE: 82 MMHG | OXYGEN SATURATION: 96 % | BODY MASS INDEX: 37.05 KG/M2 | WEIGHT: 298 LBS | TEMPERATURE: 98.2 F | SYSTOLIC BLOOD PRESSURE: 132 MMHG | HEIGHT: 75 IN

## 2019-12-21 LAB
ALBUMIN SERPL-MCNC: 3.9 G/DL (ref 3.5–5.2)
ALBUMIN/GLOB SERPL: 1.2 G/DL
ALP SERPL-CCNC: 70 U/L (ref 39–117)
ALT SERPL W P-5'-P-CCNC: 32 U/L (ref 1–41)
ANION GAP SERPL CALCULATED.3IONS-SCNC: 8 MMOL/L (ref 5–15)
AST SERPL-CCNC: 44 U/L (ref 1–40)
BASOPHILS # BLD AUTO: 0.04 10*3/MM3 (ref 0–0.2)
BASOPHILS NFR BLD AUTO: 0.5 % (ref 0–1.5)
BILIRUB SERPL-MCNC: 0.3 MG/DL (ref 0.2–1.2)
BUN BLD-MCNC: 9 MG/DL (ref 6–20)
BUN/CREAT SERPL: 10.1 (ref 7–25)
CALCIUM SPEC-SCNC: 9.6 MG/DL (ref 8.6–10.5)
CHLORIDE SERPL-SCNC: 99 MMOL/L (ref 98–107)
CO2 SERPL-SCNC: 31 MMOL/L (ref 22–29)
CREAT BLD-MCNC: 0.89 MG/DL (ref 0.76–1.27)
DEPRECATED RDW RBC AUTO: 45.6 FL (ref 37–54)
EOSINOPHIL # BLD AUTO: 0.1 10*3/MM3 (ref 0–0.4)
EOSINOPHIL NFR BLD AUTO: 1.4 % (ref 0.3–6.2)
ERYTHROCYTE [DISTWIDTH] IN BLOOD BY AUTOMATED COUNT: 15.2 % (ref 12.3–15.4)
GFR SERPL CREATININE-BSD FRML MDRD: 97 ML/MIN/1.73
GLOBULIN UR ELPH-MCNC: 3.2 GM/DL
GLUCOSE BLD-MCNC: 111 MG/DL (ref 65–99)
HCT VFR BLD AUTO: 32.7 % (ref 37.5–51)
HGB BLD-MCNC: 9.6 G/DL (ref 13–17.7)
IMM GRANULOCYTES # BLD AUTO: 0.02 10*3/MM3 (ref 0–0.05)
IMM GRANULOCYTES NFR BLD AUTO: 0.3 % (ref 0–0.5)
LYMPHOCYTES # BLD AUTO: 1.64 10*3/MM3 (ref 0.7–3.1)
LYMPHOCYTES NFR BLD AUTO: 22.3 % (ref 19.6–45.3)
MCH RBC QN AUTO: 24.7 PG (ref 26.6–33)
MCHC RBC AUTO-ENTMCNC: 29.4 G/DL (ref 31.5–35.7)
MCV RBC AUTO: 84.1 FL (ref 79–97)
MONOCYTES # BLD AUTO: 0.64 10*3/MM3 (ref 0.1–0.9)
MONOCYTES NFR BLD AUTO: 8.7 % (ref 5–12)
NEUTROPHILS # BLD AUTO: 4.92 10*3/MM3 (ref 1.7–7)
NEUTROPHILS NFR BLD AUTO: 66.8 % (ref 42.7–76)
NRBC BLD AUTO-RTO: 0 /100 WBC (ref 0–0.2)
PLATELET # BLD AUTO: 425 10*3/MM3 (ref 140–450)
PMV BLD AUTO: 10.1 FL (ref 6–12)
POTASSIUM BLD-SCNC: 4.1 MMOL/L (ref 3.5–5.2)
PROT SERPL-MCNC: 7.1 G/DL (ref 6–8.5)
RBC # BLD AUTO: 3.89 10*6/MM3 (ref 4.14–5.8)
SODIUM BLD-SCNC: 138 MMOL/L (ref 136–145)
WBC NRBC COR # BLD: 7.36 10*3/MM3 (ref 3.4–10.8)

## 2019-12-21 PROCEDURE — 63710000001 HYDROCODONE-ACETAMINOPHEN 10-325 MG TABLET: Performed by: PHYSICIAN ASSISTANT

## 2019-12-21 PROCEDURE — 99024 POSTOP FOLLOW-UP VISIT: CPT | Performed by: SURGERY

## 2019-12-21 PROCEDURE — A9270 NON-COVERED ITEM OR SERVICE: HCPCS | Performed by: SURGERY

## 2019-12-21 PROCEDURE — A9270 NON-COVERED ITEM OR SERVICE: HCPCS | Performed by: PHYSICIAN ASSISTANT

## 2019-12-21 PROCEDURE — 63710000001 LEVOFLOXACIN 500 MG TABLET: Performed by: INTERNAL MEDICINE

## 2019-12-21 PROCEDURE — 80053 COMPREHEN METABOLIC PANEL: CPT | Performed by: SURGERY

## 2019-12-21 PROCEDURE — 74019 RADEX ABDOMEN 2 VIEWS: CPT

## 2019-12-21 PROCEDURE — 85025 COMPLETE CBC W/AUTO DIFF WBC: CPT | Performed by: SURGERY

## 2019-12-21 PROCEDURE — 25010000002 METOCLOPRAMIDE PER 10 MG: Performed by: INTERNAL MEDICINE

## 2019-12-21 PROCEDURE — 63710000001 PANTOPRAZOLE 40 MG TABLET DELAYED-RELEASE: Performed by: PHYSICIAN ASSISTANT

## 2019-12-21 PROCEDURE — 63710000001 BUPROPION XL 150 MG TABLET SUSTAINED-RELEASE 24 HOUR: Performed by: PHYSICIAN ASSISTANT

## 2019-12-21 PROCEDURE — G0378 HOSPITAL OBSERVATION PER HR: HCPCS

## 2019-12-21 PROCEDURE — 99225 PR SBSQ OBSERVATION CARE/DAY 25 MINUTES: CPT | Performed by: INTERNAL MEDICINE

## 2019-12-21 PROCEDURE — 63710000001 SIMETHICONE 80 MG CHEWABLE TABLET: Performed by: SURGERY

## 2019-12-21 PROCEDURE — A9270 NON-COVERED ITEM OR SERVICE: HCPCS | Performed by: INTERNAL MEDICINE

## 2019-12-21 PROCEDURE — 63710000001 ACETAMINOPHEN 325 MG TABLET: Performed by: PHYSICIAN ASSISTANT

## 2019-12-21 RX ORDER — METOCLOPRAMIDE 10 MG/1
10 TABLET ORAL
Qty: 120 TABLET | Refills: 0 | Status: SHIPPED | OUTPATIENT
Start: 2019-12-21 | End: 2020-04-24

## 2019-12-21 RX ADMIN — METOCLOPRAMIDE 10 MG: 5 INJECTION, SOLUTION INTRAMUSCULAR; INTRAVENOUS at 12:08

## 2019-12-21 RX ADMIN — BUPROPION HYDROCHLORIDE 300 MG: 150 TABLET, FILM COATED, EXTENDED RELEASE ORAL at 06:29

## 2019-12-21 RX ADMIN — PANTOPRAZOLE SODIUM 40 MG: 40 TABLET, DELAYED RELEASE ORAL at 06:29

## 2019-12-21 RX ADMIN — HYDROCODONE BITARTRATE AND ACETAMINOPHEN 2 TABLET: 10; 325 TABLET ORAL at 06:35

## 2019-12-21 RX ADMIN — LEVOFLOXACIN 500 MG: 500 TABLET, FILM COATED ORAL at 09:02

## 2019-12-21 RX ADMIN — ACETAMINOPHEN 650 MG: 325 TABLET ORAL at 12:05

## 2019-12-21 RX ADMIN — SIMETHICONE CHEW TAB 80 MG 120 MG: 80 TABLET ORAL at 12:06

## 2019-12-21 RX ADMIN — METOCLOPRAMIDE 10 MG: 5 INJECTION, SOLUTION INTRAMUSCULAR; INTRAVENOUS at 06:29

## 2019-12-21 RX ADMIN — SIMETHICONE CHEW TAB 80 MG 120 MG: 80 TABLET ORAL at 06:29

## 2019-12-21 NOTE — PLAN OF CARE
Problem: Patient Care Overview  Goal: Plan of Care Review  Outcome: Ongoing (interventions implemented as appropriate)  Flowsheets  Taken 12/21/2019 0448 by Lian Quintero RN  Progress: improving  Taken 12/21/2019 0800 by Catherine Hernández RN  Plan of Care Reviewed With: patient  Taken 12/21/2019 1310 by Catherine Hernández RN  Outcome Summary: ready for D/C  Goal: Individualization and Mutuality  Outcome: Ongoing (interventions implemented as appropriate)  Goal: Discharge Needs Assessment  Outcome: Ongoing (interventions implemented as appropriate)  Goal: Interprofessional Rounds/Family Conf  Outcome: Ongoing (interventions implemented as appropriate)

## 2019-12-21 NOTE — PLAN OF CARE
Problem: Patient Care Overview  Goal: Plan of Care Review  Flowsheets  Taken 12/21/2019 0448  Progress: improving  Outcome Summary: Pt ambulating, room air, VSS. Pt states he has no pain. Found that pt threw his IS away. Pt makes continued remarks on wanting to go home. Will continue to monitor.  Taken 12/20/2019 2000  Plan of Care Reviewed With: patient

## 2019-12-21 NOTE — PROGRESS NOTES
[facility] Medicine Services  PROGRESS NOTE    Patient Name: Sergio Lucero  : 1985  MRN: [id number]    Date of Admission: 2019  Primary Care Physician: Noelle Roberts MD    Subjective   Subjective     CC: f/u abd pain    HPI: D/C cancelled yesterday due to uncontrolled pain. Up walking around room. Reports adequate pain control. Required no IV narcotics overnight. Again asking to go home today.    Review of Systems  Gen- No fevers, chills  CV- No chest pain, palpitations  Resp- No cough, dyspnea  GI- No N/V/D, abd pain    Objective   Objective     Vital Signs:   Temp:  [98.2 °F (36.8 °C)-98.7 °F (37.1 °C)] 98.2 °F (36.8 °C)  Heart Rate:  [70-94] 94  Resp:  [16-18] 16  BP: (128-158)/(82-93) 132/82        Physical Exam:  Constitutional: No acute distress, awake, alert  HENT: NCAT, mucous membranes moist  Respiratory: Clear to auscultation bilaterally, respiratory effort normal   Cardiovascular: RRR, no murmurs, rubs, or gallops, palpable pedal pulses bilaterally  Gastrointestinal: Positive bowel sounds, soft, nontender, nondistended  Musculoskeletal: No bilateral ankle edema  Psychiatric: Appropriate affect, cooperative  Neurologic: Oriented x 3, strength symmetric in all extremities, Cranial Nerves grossly intact to confrontation, speech clear  Skin: No rashes    Results Reviewed:    Results from last 7 days   Lab Units 19  0729 19  0456 19  1438   WBC 10*3/mm3 7.36 9.57 8.08   HEMOGLOBIN g/dL 9.6* 8.6* 8.9*   HEMATOCRIT % 32.7* 28.8*  27.6* 31.1*   PLATELETS 10*3/mm3 425 385 358   PROCALCITONIN ng/mL  --  0.07*  --      Results from last 7 days   Lab Units 19  0729 19  0456 19  1438 19  0509   SODIUM mmol/L 138 136 138 138   POTASSIUM mmol/L 4.1 4.5 4.1 4.6   CHLORIDE mmol/L 99 98 99 101   CO2 mmol/L 31.0* 28.0 30.0* 26.0   BUN mg/dL 9 16 13 11   CREATININE mg/dL 0.89 0.88 0.87 0.87   GLUCOSE mg/dL 111* 119* 103* 111*    CALCIUM mg/dL 9.6 8.9 9.1 9.1   ALT (SGPT) U/L 32  --  31 16   AST (SGOT) U/L 44*  --  44* 20     Estimated Creatinine Clearance: 170.4 mL/min (by C-G formula based on SCr of 0.89 mg/dL).    Microbiology Results Abnormal     Procedure Component Value - Date/Time    Blood Culture - Blood, Arm, Left [[id number]] Collected:  12/18/19 1730    Lab Status:  Preliminary result Specimen:  Blood from Arm, Left Updated:  12/20/19 1815     Blood Culture No growth at 2 days    Blood Culture - Blood, Arm, Right [[id number]] Collected:  12/18/19 1740    Lab Status:  Preliminary result Specimen:  Blood from Arm, Right Updated:  12/20/19 1815     Blood Culture No growth at 2 days    S. Pneumo Ag Urine or CSF - Urine, Urine, Clean Catch [[id number]]  (Normal) Collected:  12/18/19 2050    Lab Status:  Final result Specimen:  Urine, Clean Catch Updated:  12/19/19 0709     Strep Pneumo Ag Negative    Legionella Antigen, Urine - Urine, Urine, Clean Catch [[id number]]  (Normal) Collected:  12/18/19 2050    Lab Status:  Final result Specimen:  Urine, Clean Catch Updated:  12/19/19 0709     LEGIONELLA ANTIGEN, URINE Negative          Imaging Results (Last 24 Hours)     Procedure Component Value Units Date/Time    XR Abdomen Flat & Upright [[id number]] Resulted:  12/21/19 0841     Updated:  12/21/19 0842    FL Upper GI Single Contrast With KUB [[id number]] Collected:  12/20/19 1533     Updated:  12/20/19 1826    Narrative:       EXAMINATION: FL UPPER GI SINGLE CONTRAST W KUB-     INDICATION: s/p Nissen fundoplication; J18.1-Lobar pneumonia,  unspecified organism; J18.9-Pneumonia, unspecified organism;  A02-Gjqeklfgpx condition; G89.18-Other acute postprocedural pain;  Z87.19-Personal history of other diseases of the digestive system;  G89.4-Chronic pain syndrome     TECHNIQUE: 32 seconds of fluoroscopic time was used for this exam. 9  associated images were saved.  imaging reveals mild gaseous  distention of the stomach.     COMPARISON:  NONE     FINDINGS: Under fluoroscopic observation, the patient ingested thin  barium. The oral phase of deglutition appeared normal. The esophageal  mucosa appeared grossly normal. There was no evidence of a focal  esophageal stricture. Examination of the stomach demonstrated  postoperative changes that are consistent with a Nissen fundoplication.  The gastroesophageal junction appeared narrow, and patent. No  extravasation of contrast was seen. There is mild gaseous distention of  the stomach. There was no evidence of a gastric or duodenal ulcer. There  was no delay in gastric emptying. The duodenal bulb and duodenal C-loop  appeared grossly normal.          Impression:       1. Status post Nissen fundoplication x5 days. The gastroesophageal  junction appeared narrow, and patent. There was no evidence of  extraluminal contrast. There was no delay in gastric emptying.  2. Mild gaseous distention of the stomach         This report was finalized on 12/20/2019 6:22 PM by Dr. Tree Asif MD.                  I have reviewed the medications:  Scheduled Meds:  barium sulfate 183 mL Oral Once in imaging   buPROPion  mg Oral QAM   levoFLOXacin 500 mg Oral Daily   LORazepam 1 mg Oral Nightly   metoclopramide 10 mg Intravenous 4x Daily AC & at Bedtime   pantoprazole 40 mg Oral QAM AC   simethicone 120 mg Oral 4x Daily AC & at Bedtime   sodium chloride 10 mL Intravenous Q12H     Continuous Infusions:   PRN Meds:.•  acetaminophen **OR** acetaminophen **OR** acetaminophen  •  HYDROcodone-acetaminophen  •  Morphine  •  oxyCODONE-acetaminophen  •  simethicone  •  [COMPLETED] Insert peripheral IV **AND** sodium chloride  •  sodium chloride  •  zolpidem      Assessment/Plan   Assessment / Plan     Active Hospital Problems    Diagnosis  POA   • **HCAP (healthcare-associated pneumonia) [J18.9]  Yes   • Post-op pain [G89.18]  Yes   • Pneumonia of left lower lobe due to infectious organism (CMS/HCC) [J18.1]  Yes   • Chronic pain  disorder [G89.4]  Yes   • Attention deficit disorder (ADD) without hyperactivity [F98.8]  Yes   • Anxiety with depression [F41.8]  Yes   • Obstructive sleep apnea [G47.33]  Yes      Resolved Hospital Problems   No resolved problems to display.        Brief Hospital Course to date:  Sergio Lucero is a 34 year old s/p hiatal hernia repair 12- presents to the ED with worsening pain at his surgical site. Incidentally LLL pneumonia was identified on CT of abdomen and pelvis.      A/P:      Early LLL bacterial pneumonia.  -Blood cultures now growth. Initially placed on IV zosyn to which he responded well. Continue PO Levaquin at d/c. D/W patient.     Post Op Pain s/p hiatal hernia repair with mesh by Dr. Angel  Gastric distension  -Dr. Angel has seen. I have d/w him. Patient well known to him and well known to be manipulative. The patient underwent EDG with balloon dilation of pylorus which he tolerated well. Underwent subsequent barium swallow showing persistent distension but with transit of contrast throughout. Patient ate cheeseburger despite knowing he is supposed to be on CLD. I spoke with Dr. Angel he recommended d/c on CLD w/ reglan and simethicone once patient's pain controlled.  -Awaiting Dr. Brody's input and review of KUB but if she agrees will plan to d/c today.    DVT Prophylaxis: Mechanical    Disposition: I expect the patient to be discharged today.    CODE STATUS:   Code Status and Medical Interventions:   Ordered at: 12/18/19 1941     Level Of Support Discussed With:    Patient     Code Status:    CPR     Medical Interventions (Level of Support Prior to Arrest):    Full         Electronically signed by Sujey Lr II, DO, 12/21/19, 12:06 PM.

## 2019-12-21 NOTE — PROGRESS NOTES
"Bariatric Surgery Progress Note:      Chief Complaint:  POD #1    Subjective     Interval History:  Doing well.  No complaints.  Pain controlled--no IV narcotics x 24 hours, and he reports only needing Tylenol for pain.  Anxious to be discharged so he can be home with his 4 children.  Denies N/V.  No fevers.  Ambulating.  Voiding.    Objective     Vital Signs  Blood pressure 132/82, pulse 94, temperature 98.2 °F (36.8 °C), temperature source Oral, resp. rate 16, height 190.5 cm (75\"), weight 135 kg (298 lb), SpO2 96 %.      Intake/Output Summary (Last 24 hours) at 12/21/2019 1304  Last data filed at 12/20/2019 1800  Gross per 24 hour   Intake 480 ml   Output --   Net 480 ml       Physical Exam:  General: Alert, NAD.  Dressed.  Lungs: Clear  Heart: RRR  Abdomen: soft, appropriate, incisions okay  Extremities: warm, (+) SCDs       Labs:  Lab Results (last 24 hours)     Procedure Component Value Units Date/Time    Comprehensive Metabolic Panel [[id number]]  (Abnormal) Collected:  12/21/19 0729    Specimen:  Blood Updated:  12/21/19 0807     Glucose 111 mg/dL      BUN 9 mg/dL      Creatinine 0.89 mg/dL      Sodium 138 mmol/L      Potassium 4.1 mmol/L      Chloride 99 mmol/L      CO2 31.0 mmol/L      Calcium 9.6 mg/dL      Total Protein 7.1 g/dL      Albumin 3.90 g/dL      ALT (SGPT) 32 U/L      AST (SGOT) 44 U/L      Alkaline Phosphatase 70 U/L      Total Bilirubin 0.3 mg/dL      eGFR Non African Amer 97 mL/min/1.73      Globulin 3.2 gm/dL      A/G Ratio 1.2 g/dL      BUN/Creatinine Ratio 10.1     Anion Gap 8.0 mmol/L     Narrative:       GFR Normal >60  Chronic Kidney Disease <60  Kidney Failure <15      CBC & Differential [[id number]] Collected:  12/21/19 0729    Specimen:  Blood Updated:  12/21/19 0745    Narrative:       The following orders were created for panel order CBC & Differential.  Procedure                               Abnormality         Status                     ---------                               " -----------         ------                     CBC Auto Differential[[id number]]        Abnormal            Final result                 Please view results for these tests on the individual orders.    CBC Auto Differential [[id number]]  (Abnormal) Collected:  12/21/19 0729    Specimen:  Blood Updated:  12/21/19 0745     WBC 7.36 10*3/mm3      RBC 3.89 10*6/mm3      Hemoglobin 9.6 g/dL      Hematocrit 32.7 %      MCV 84.1 fL      MCH 24.7 pg      MCHC 29.4 g/dL      RDW 15.2 %      RDW-SD 45.6 fl      MPV 10.1 fL      Platelets 425 10*3/mm3      Neutrophil % 66.8 %      Lymphocyte % 22.3 %      Monocyte % 8.7 %      Eosinophil % 1.4 %      Basophil % 0.5 %      Immature Grans % 0.3 %      Neutrophils, Absolute 4.92 10*3/mm3      Lymphocytes, Absolute 1.64 10*3/mm3      Monocytes, Absolute 0.64 10*3/mm3      Eosinophils, Absolute 0.10 10*3/mm3      Basophils, Absolute 0.04 10*3/mm3      Immature Grans, Absolute 0.02 10*3/mm3      nRBC 0.0 /100 WBC     Blood Culture - Blood, Arm, Left [[id number]] Collected:  12/18/19 1730    Specimen:  Blood from Arm, Left Updated:  12/20/19 1815     Blood Culture No growth at 2 days    Blood Culture - Blood, Arm, Right [[id number]] Collected:  12/18/19 1740    Specimen:  Blood from Arm, Right Updated:  12/20/19 1815     Blood Culture No growth at 2 days    Folate RBC [[id number]]  (Abnormal) Collected:  12/19/19 0456    Specimen:  Blood Updated:  12/20/19 1508     Folate, Hemolysate 475.0 ng/mL      Hematocrit 27.6 %      RBC Folate 1721 ng/mL     Narrative:       Performed at:  [address] [place] [address]  [id number]  : Ismael Coombs PhD, Phone:  [phone]            Assessment/Plan     POD # 2 s/p EGD with pyloric dilation, POD#5 s/p laparoscpic reduction/repair of large type IV paraesophageal hernia with mesh, and Nissen    Doing well.  KUB reviewed: less gaseous distension of stomach as compared to UGI  film.  Contrast lightly opacifies  colon.   Read pending.  Reports pain is mild, requiring only Tylenol.  Ok for d/c.  Follow up in 1-2 weeks in bariatric surgery office.  Rx for Reglan given.  Pt educated on risks of tardive dyskinesia/extrapyramidal side effects of this drug.  Advised to adhere to post fundoplication diet.    12/21/19  1:04 PM  Fern Brody MD

## 2019-12-23 LAB
BACTERIA SPEC AEROBE CULT: NORMAL
BACTERIA SPEC AEROBE CULT: NORMAL
VIT B12 USB SERPL-MCNC: 1553 PG/ML (ref 725–2045)

## 2019-12-24 ENCOUNTER — TELEPHONE (OUTPATIENT)
Dept: INTERNAL MEDICINE | Facility: CLINIC | Age: 34
End: 2019-12-24

## 2019-12-24 NOTE — TELEPHONE ENCOUNTER
Called pt for hospital f/u call and to confirm PCP appt Dr. Roberts 1/7/2020. LVM and requested return call. TCM N/A due to non-participating payor.

## 2019-12-27 NOTE — TELEPHONE ENCOUNTER
Second attempt to contact pt for hospital f/u call.  Left message to return call for any needs or questions and to set up hospital f/u appt with PCP office.  TCM not applicable due to nonparticipating insurance.

## 2019-12-30 ENCOUNTER — OFFICE VISIT (OUTPATIENT)
Dept: BARIATRICS/WEIGHT MGMT | Facility: CLINIC | Age: 34
End: 2019-12-30

## 2019-12-30 VITALS
BODY MASS INDEX: 36.06 KG/M2 | DIASTOLIC BLOOD PRESSURE: 78 MMHG | TEMPERATURE: 98.2 F | SYSTOLIC BLOOD PRESSURE: 142 MMHG | WEIGHT: 290 LBS | OXYGEN SATURATION: 99 % | RESPIRATION RATE: 18 BRPM | HEART RATE: 104 BPM | HEIGHT: 75 IN

## 2019-12-30 DIAGNOSIS — Z48.89 POSTOPERATIVE VISIT: Primary | ICD-10-CM

## 2019-12-30 PROCEDURE — 99024 POSTOP FOLLOW-UP VISIT: CPT | Performed by: PHYSICIAN ASSISTANT

## 2019-12-30 NOTE — PROGRESS NOTES
[facility] Bariatric Surgery  [address]  [address]  [facility] [address]  [phone]        Patient Name: Sergio Lucero.  YOB: 1985      Date of Visit: 12/30/2019      Reason for Visit:  2 wks postop    HPI:  Sergio Lucero is a 34 y.o. male s/p lap Nissen/paraHHR w/ phasix mesh 12/16/19 GDW    Was readmitted on POD#2 d/t uncontrolled pain, but is feeling much much better now.  No issues/concerns.  Tolerating gradual diet progression, drinking lots of protein shakes, had mashed potatoes last PM for dinner.  Denies dysphagia/reflux/N/V/AP.  Continues on Protonix daily.  Did not need Reglan RX.  Bowels moving.  Continues on GasX prn.  Denies fevers.  No other issues/concerns.        Past Medical History:   Diagnosis Date   • Acid reflux    • ADHD (attention deficit hyperactivity disorder)    • Anemia    • Anxiety    • Depression    • Fatty liver    • History of ankle fracture     RIGHT   • History of arm fracture     BOTH at separate times   • Insomnia    • Lumbar disc herniation     L4-L5   • Obstructive sleep apnea     on CPAP   • Wears glasses      Past Surgical History:   Procedure Laterality Date   • ADENOIDECTOMY  2009   • ENDOSCOPY N/A 12/19/2019    Procedure: ESOPHAGOGASTRODUODENOSCOPY WITH PYLORIC DILATATION;  Surgeon: Sky Angel MD;  Location: [facility] ENDOSCOPY;  Service: General   • KNEE ARTHROSCOPY Right 2009   • NISSEN FUNDOPLICATION N/A 12/16/2019    Procedure: NISSEN FUNDOPLICATION LAPAROSCOPIC;  Surgeon: Sky Angel MD;  Location:  [facility] OR;  Service: General   • ORIF HUMERUS FRACTURE Right    • PARAESOPHAGEAL HERNIA REPAIR N/A 12/16/2019    Procedure: PARAESOPHAGEAL HERNIA REPAIR LAPAROSCOPIC WITH MESH;  Surgeon: Sky Angel MD;  Location:  [facility] OR;  Service: General   • TONSILLECTOMY  2009   • WISDOM TOOTH EXTRACTION  1998     Outpatient Medications Marked as Taking for the 12/30/19 encounter (Office Visit) with Cris Tom  DELPHINE [place]   Medication Sig Dispense Refill   • amphetamine-dextroamphetamine (ADDERALL) 20 MG tablet Take 1 tablet by mouth Daily. 30 tablet 0   • amphetamine-dextroamphetamine XR (ADDERALL XR) 30 MG 24 hr capsule Take 1 capsule by mouth Every Morning 30 capsule 0   • buPROPion XL (WELLBUTRIN XL) 300 MG 24 hr tablet Take 1 tablet by mouth Every Morning. 30 tablet 2   • HYDROcodone-acetaminophen (NORCO)  MG per tablet Take 2 tablets by mouth 2 (Two) Times a Day As Needed. 1 - 2 tablets bid as directed, filled 30 day supply on 12-10-19     • pantoprazole (PROTONIX) 40 MG EC tablet Take 1 tablet by mouth 30 minutes before breakfast daily. (Patient taking differently: Take 40 mg by mouth Daily. Take 1 tablet by mouth 30 minutes before breakfast daily.) 90 tablet 3   • simethicone (GAS-X) 80 MG chewable tablet Chew 1 tablet Every 6 (Six) Hours As Needed for Flatulence. 120 tablet 0   • zolpidem (AMBIEN) 10 MG tablet Take 1 tablet by mouth every night at bedtime. 30 tablet 0     Allergies   Allergen Reactions   • Fish-Derived Products Anaphylaxis   • Nuts Anaphylaxis   • Other Anaphylaxis     All Beans (mahajan, lima)   • Shellfish Allergy Anaphylaxis   • Iodine Hives     itching       Social History     Socioeconomic History   • Marital status:      Spouse name: Not on file   • Number of children: 4   • Years of education: Not on file   • Highest education level: Not on file   Tobacco Use   • Smoking status: Never Smoker   • Smokeless tobacco: Never Used   Substance and Sexual Activity   • Alcohol use: Yes     Alcohol/week: 4.0 standard drinks     Types: 4 Cans of beer per week     Frequency: 4 or more times a week   • Drug use: No   • Sexual activity: Yes     Partners: Female       Vitals:    12/30/19 1148   BP: 142/78   Pulse: 104   Resp: 18   Temp: 98.2 °F (36.8 °C)   SpO2: 99%     Weight 132 kg (290 lb)  Body mass index is 36.25 kg/m².    Physical Exam   Constitutional: He appears well-developed and  well-nourished.   Cardiovascular: Normal rate and regular rhythm.   Pulmonary/Chest: Effort normal.   Abdominal: Soft. There is no tenderness. No hernia.   lap incisions healing well   Musculoskeletal: Normal range of motion.   Neurological: He is alert.   Skin: Skin is warm and dry.   Psychiatric: He has a normal mood and affect.       Assessment:  POD #14 s/p lap Nissen/paraHHR w/ phasix mesh 12/16/19 GDW      Plan:  Doing well.  No issues/concerns.  Continue gradual diet progression.  Continue w/ lifting restrictions, nothing >10 lbs x 6 weeks.  Call w/ issues/concerns.  Follow up prn.

## 2019-12-31 DIAGNOSIS — G47.00 INSOMNIA, UNSPECIFIED TYPE: ICD-10-CM

## 2019-12-31 DIAGNOSIS — F98.8 ATTENTION DEFICIT DISORDER (ADD) WITHOUT HYPERACTIVITY: ICD-10-CM

## 2019-12-31 RX ORDER — DEXTROAMPHETAMINE SACCHARATE, AMPHETAMINE ASPARTATE, DEXTROAMPHETAMINE SULFATE AND AMPHETAMINE SULFATE 5; 5; 5; 5 MG/1; MG/1; MG/1; MG/1
1 TABLET ORAL DAILY
Qty: 30 TABLET | Refills: 0 | Status: CANCELLED | OUTPATIENT
Start: 2019-12-31

## 2019-12-31 RX ORDER — ZOLPIDEM TARTRATE 10 MG/1
10 TABLET ORAL
Qty: 30 TABLET | Refills: 0 | Status: CANCELLED | OUTPATIENT
Start: 2019-12-31

## 2020-01-02 DIAGNOSIS — F98.8 ATTENTION DEFICIT DISORDER (ADD) WITHOUT HYPERACTIVITY: ICD-10-CM

## 2020-01-02 DIAGNOSIS — G47.00 INSOMNIA, UNSPECIFIED TYPE: ICD-10-CM

## 2020-01-02 RX ORDER — ZOLPIDEM TARTRATE 10 MG/1
10 TABLET ORAL
Qty: 30 TABLET | Refills: 0 | Status: SHIPPED | OUTPATIENT
Start: 2020-01-02 | End: 2020-02-01 | Stop reason: SDUPTHER

## 2020-01-02 RX ORDER — DEXTROAMPHETAMINE SACCHARATE, AMPHETAMINE ASPARTATE MONOHYDRATE, DEXTROAMPHETAMINE SULFATE AND AMPHETAMINE SULFATE 7.5; 7.5; 7.5; 7.5 MG/1; MG/1; MG/1; MG/1
30 CAPSULE, EXTENDED RELEASE ORAL EVERY MORNING
Qty: 30 CAPSULE | Refills: 0 | Status: SHIPPED | OUTPATIENT
Start: 2020-01-02 | End: 2020-02-01 | Stop reason: SDUPTHER

## 2020-01-02 RX ORDER — DEXTROAMPHETAMINE SACCHARATE, AMPHETAMINE ASPARTATE, DEXTROAMPHETAMINE SULFATE AND AMPHETAMINE SULFATE 5; 5; 5; 5 MG/1; MG/1; MG/1; MG/1
1 TABLET ORAL DAILY
Qty: 30 TABLET | Refills: 0 | Status: SHIPPED | OUTPATIENT
Start: 2020-01-02 | End: 2020-02-01 | Stop reason: SDUPTHER

## 2020-01-23 ENCOUNTER — OFFICE VISIT (OUTPATIENT)
Dept: GASTROENTEROLOGY | Facility: CLINIC | Age: 35
End: 2020-01-23

## 2020-01-23 VITALS
HEART RATE: 103 BPM | SYSTOLIC BLOOD PRESSURE: 160 MMHG | TEMPERATURE: 98.2 F | DIASTOLIC BLOOD PRESSURE: 88 MMHG | BODY MASS INDEX: 37.25 KG/M2 | OXYGEN SATURATION: 98 % | WEIGHT: 298 LBS

## 2020-01-23 DIAGNOSIS — R19.7 ACUTE DIARRHEA: Primary | ICD-10-CM

## 2020-01-23 PROCEDURE — 99214 OFFICE O/P EST MOD 30 MIN: CPT | Performed by: INTERNAL MEDICINE

## 2020-01-23 NOTE — PROGRESS NOTES
PCP: Noelle Roberts MD    Chief Complaint   Patient presents with   • change in bowel habits       History of Present Illness:   Sergio Lucero is a 34 y.o. male who presents to GI clinic as a follow up for a new symptom. He underwent a nisson fundoplication in late December 2019. This was complicated by acute discomfort and possible pneumonia. He was discharged with no new pathologic upper gi finding. He was treated for pneumonia. Unfortunately, starting January 1/2020, he began having diarrhea. Typically 5 x a day with urgency. No hematochezia. Occasional nocturnal symptoms.  No acute wt loss. No abdominal pain.  Last hg 9.6.  Of note, he was diagnosed with influenza a around 7 days ago. No one in the house with diarrhea, his children also with flu. Travel to [place] for job interview.    Past Medical History:   Diagnosis Date   • Acid reflux    • ADHD (attention deficit hyperactivity disorder)    • Anemia    • Anxiety    • Depression    • Fatty liver    • History of ankle fracture     RIGHT   • History of arm fracture     BOTH at separate times   • Insomnia    • Lumbar disc herniation     L4-L5   • Obstructive sleep apnea     on CPAP   • Wears glasses        Past Surgical History:   Procedure Laterality Date   • ADENOIDECTOMY  2009   • ENDOSCOPY N/A 12/19/2019    Procedure: ESOPHAGOGASTRODUODENOSCOPY WITH PYLORIC DILATATION;  Surgeon: Sky Angel MD;  Location: [facility] ENDOSCOPY;  Service: General   • KNEE ARTHROSCOPY Right 2009   • NISSEN FUNDOPLICATION N/A 12/16/2019    Procedure: NISSEN FUNDOPLICATION LAPAROSCOPIC;  Surgeon: Sky Angel MD;  Location:  [facility];  Service: General   • ORIF HUMERUS FRACTURE Right    • PARAESOPHAGEAL HERNIA REPAIR N/A 12/16/2019    Procedure: PARAESOPHAGEAL HERNIA REPAIR LAPAROSCOPIC WITH MESH;  Surgeon: Sky Angel MD;  Location:  [facility];  Service: General   • TONSILLECTOMY  2009   • WISDOM TOOTH EXTRACTION  1998         Current  Outpatient Medications:   •  amphetamine-dextroamphetamine (ADDERALL) 20 MG tablet, Take 1 tablet by mouth Daily., Disp: 30 tablet, Rfl: 0  •  amphetamine-dextroamphetamine XR (ADDERALL XR) 30 MG 24 hr capsule, Take 1 capsule by mouth Every Morning, Disp: 30 capsule, Rfl: 0  •  buPROPion XL (WELLBUTRIN XL) 300 MG 24 hr tablet, Take 1 tablet by mouth Every Morning., Disp: 30 tablet, Rfl: 2  •  HYDROcodone-acetaminophen (NORCO)  MG per tablet, Take 2 tablets by mouth 2 (Two) Times a Day As Needed. 1 - 2 tablets bid as directed, filled 30 day supply on 12-10-19, Disp: , Rfl:   •  metoclopramide (REGLAN) 10 MG tablet, Take 1 tablet by mouth 4 (Four) Times a Day Before Meals & at Bedtime., Disp: 120 tablet, Rfl: 0  •  oseltamivir (TAMIFLU) 75 MG capsule, Take 1 capsule by mouth 2 (Two) Times a Day., Disp: 10 capsule, Rfl: 0  •  pantoprazole (PROTONIX) 40 MG EC tablet, Take 1 tablet by mouth 30 minutes before breakfast daily. (Patient taking differently: Take 40 mg by mouth Daily. Take 1 tablet by mouth 30 minutes before breakfast daily.), Disp: 90 tablet, Rfl: 3  •  simethicone (GAS-X) 80 MG chewable tablet, Chew 1 tablet Every 6 (Six) Hours As Needed for Flatulence., Disp: 120 tablet, Rfl: 0  •  zolpidem (AMBIEN) 10 MG tablet, Take 1 tablet by mouth every night at bedtime., Disp: 30 tablet, Rfl: 0    Allergies   Allergen Reactions   • Fish-Derived Products Anaphylaxis   • Nuts Anaphylaxis   • Other Anaphylaxis     All Beans (mahajan, lima)   • Shellfish Allergy Anaphylaxis   • Iodine Hives     itching       Family History   Problem Relation Age of Onset   • Heart attack Father 56   • Obesity Father    • Cancer Mother    • Obesity Mother    • No Known Problems Daughter    • No Known Problems Son    • No Known Problems Maternal Grandmother    • No Known Problems Maternal Grandfather    • No Known Problems Paternal Grandmother    • No Known Problems Paternal Grandfather        Social History     Socioeconomic History    • Marital status:      Spouse name: Not on file   • Number of children: 4   • Years of education: Not on file   • Highest education level: Not on file   Tobacco Use   • Smoking status: Never Smoker   • Smokeless tobacco: Never Used   Substance and Sexual Activity   • Alcohol use: Yes     Alcohol/week: 4.0 standard drinks     Types: 4 Cans of beer per week     Frequency: 4 or more times a week   • Drug use: No   • Sexual activity: Yes     Partners: Female       Review of Systems   Constitutional: Negative.    HENT: Negative.    Eyes: Negative.    Respiratory: Negative.    Cardiovascular: Negative.    Gastrointestinal: Positive for diarrhea and nausea.   Endocrine: Negative.    Genitourinary: Negative.    Musculoskeletal: Negative.    Skin: Negative.    Allergic/Immunologic: Negative.    Neurological: Negative.    Hematological: Negative.    Psychiatric/Behavioral: Negative.          There were no vitals filed for this visit.    Physical Exam  General Appearance:  Vitals as above. no acute distress  Head/face:  Normocephalic, atraumatic  Eyes:   EOMI, no conjunctivitis or icterus   Nose/Sinuses:  Nares patent bilaterally without discharge or lesions  Mouth/Throat:  Posterior pharynx is pink without drainage or exudates;  dentition is in good condition and repair  Neck:  trachea is midline, no thyromegaly  Neurologic:  Alert; no focal deficits; age appropriate behavior and speech  Psychiatric: mood and affect are congruent  Skin: no rash or cyanosis.  Abdomen: obese and soft/nt      Assessment/Plan  1.) Acute persistent diarrhea  Onset: 3 weeks ago. Risk factors for c.dif: hospitalization, antibiotics.  Therefore will assess for c.dif and gi panel.  This preceded his diagnosis of the flu.   If symptoms persist through weekend and stool negative for infection, schedule colonoscopy next week.    2.) Anemia  Recommend repeat hemogram in 4-8 weeks by pcp.    3.) Obesity  4.) Abnormal liver enzymes  Consistent with  fatty liver.  He knows that this will improve when he loses wt    5.) s/p large hiatal hernia with recent fundoplication by dr angel  6.) History of gastric erosions related to herniation  Management per dr. Angel    Future decision: colonoscopy vs antibiotics.      Aries Sandhu MD  1/23/2020

## 2020-01-24 ENCOUNTER — LAB (OUTPATIENT)
Dept: LAB | Facility: HOSPITAL | Age: 35
End: 2020-01-24

## 2020-01-24 DIAGNOSIS — R19.7 ACUTE DIARRHEA: ICD-10-CM

## 2020-01-24 LAB
ADV 40+41 DNA STL QL NAA+NON-PROBE: NOT DETECTED
ASTRO TYP 1-8 RNA STL QL NAA+NON-PROBE: NOT DETECTED
C CAYETANENSIS DNA STL QL NAA+NON-PROBE: NOT DETECTED
C DIFF TOX GENS STL QL NAA+PROBE: NOT DETECTED
CAMPY SP DNA.DIARRHEA STL QL NAA+PROBE: NOT DETECTED
CRYPTOSP STL CULT: NOT DETECTED
E COLI DNA SPEC QL NAA+PROBE: NOT DETECTED
E HISTOLYT AG STL-ACNC: NOT DETECTED
EAEC PAA PLAS AGGR+AATA ST NAA+NON-PRB: NOT DETECTED
EC STX1 + STX2 GENES STL NAA+PROBE: NOT DETECTED
EPEC EAE GENE STL QL NAA+NON-PROBE: NOT DETECTED
ETEC LTA+ST1A+ST1B TOX ST NAA+NON-PROBE: NOT DETECTED
G LAMBLIA DNA SPEC QL NAA+PROBE: NOT DETECTED
NOROVIRUS GI+II RNA STL QL NAA+NON-PROBE: NOT DETECTED
P SHIGELLOIDES DNA STL QL NAA+PROBE: NOT DETECTED
RV RNA STL NAA+PROBE: NOT DETECTED
SALMONELLA DNA SPEC QL NAA+PROBE: NOT DETECTED
SAPO I+II+IV+V RNA STL QL NAA+NON-PROBE: NOT DETECTED
SHIGELLA SP+EIEC IPAH STL QL NAA+PROBE: NOT DETECTED
V CHOLERAE DNA SPEC QL NAA+PROBE: NOT DETECTED
VIBRIO DNA SPEC NAA+PROBE: NOT DETECTED
YERSINIA STL CULT: NOT DETECTED

## 2020-01-24 PROCEDURE — 87045 FECES CULTURE AEROBIC BACT: CPT

## 2020-01-24 PROCEDURE — 87046 STOOL CULTR AEROBIC BACT EA: CPT

## 2020-01-24 PROCEDURE — 0097U HC BIOFIRE FILMARRAY GI PANEL: CPT

## 2020-01-24 PROCEDURE — 87493 C DIFF AMPLIFIED PROBE: CPT

## 2020-01-24 PROCEDURE — 87427 SHIGA-LIKE TOXIN AG IA: CPT

## 2020-01-25 RX ORDER — OCTISALATE, AVOBENZONE, HOMOSALATE, AND OCTOCRYLENE 29.4; 29.4; 49; 25.48 MG/ML; MG/ML; MG/ML; MG/ML
4 LOTION TOPICAL DAILY
Qty: 30 CAPSULE | Refills: 1 | Status: SHIPPED | OUTPATIENT
Start: 2020-01-25 | End: 2020-04-24

## 2020-01-25 RX ORDER — LOPERAMIDE HYDROCHLORIDE 2 MG/1
2 CAPSULE ORAL 4 TIMES DAILY PRN
Qty: 120 CAPSULE | Refills: 3 | Status: SHIPPED | OUTPATIENT
Start: 2020-01-25 | End: 2020-05-20

## 2020-01-29 LAB
CAMPYLOBACTER STL CULT: NORMAL
E COLI SXT STL QL IA: NEGATIVE
Lab: NORMAL
Lab: NORMAL
SALM + SHIG STL CULT: NORMAL
YERSINIA SPEC CULT: NORMAL
YERSINIA SPEC CULT: NORMAL

## 2020-01-30 DIAGNOSIS — Z12.11 SCREENING FOR COLON CANCER: Primary | ICD-10-CM

## 2020-02-01 DIAGNOSIS — F98.8 ATTENTION DEFICIT DISORDER (ADD) WITHOUT HYPERACTIVITY: ICD-10-CM

## 2020-02-01 DIAGNOSIS — G47.00 INSOMNIA, UNSPECIFIED TYPE: ICD-10-CM

## 2020-02-03 ENCOUNTER — TELEPHONE (OUTPATIENT)
Dept: GASTROENTEROLOGY | Facility: CLINIC | Age: 35
End: 2020-02-03

## 2020-02-03 RX ORDER — DEXTROAMPHETAMINE SACCHARATE, AMPHETAMINE ASPARTATE MONOHYDRATE, DEXTROAMPHETAMINE SULFATE AND AMPHETAMINE SULFATE 7.5; 7.5; 7.5; 7.5 MG/1; MG/1; MG/1; MG/1
30 CAPSULE, EXTENDED RELEASE ORAL EVERY MORNING
Qty: 30 CAPSULE | Refills: 0 | Status: SHIPPED | OUTPATIENT
Start: 2020-02-03 | End: 2020-02-28 | Stop reason: SDUPTHER

## 2020-02-03 RX ORDER — ZOLPIDEM TARTRATE 10 MG/1
10 TABLET ORAL
Qty: 30 TABLET | Refills: 0 | Status: SHIPPED | OUTPATIENT
Start: 2020-02-03 | End: 2020-02-28 | Stop reason: SDUPTHER

## 2020-02-03 RX ORDER — DEXTROAMPHETAMINE SACCHARATE, AMPHETAMINE ASPARTATE, DEXTROAMPHETAMINE SULFATE AND AMPHETAMINE SULFATE 5; 5; 5; 5 MG/1; MG/1; MG/1; MG/1
1 TABLET ORAL DAILY
Qty: 30 TABLET | Refills: 0 | Status: SHIPPED | OUTPATIENT
Start: 2020-02-03 | End: 2020-02-28 | Stop reason: SDUPTHER

## 2020-02-03 NOTE — TELEPHONE ENCOUNTER
I called pharmacist back. Clenpiq need a PA. I advised pharmacist to switched Clenpiq to Suprep or Gavilyte.

## 2020-02-06 ENCOUNTER — LAB REQUISITION (OUTPATIENT)
Dept: LAB | Facility: HOSPITAL | Age: 35
End: 2020-02-06

## 2020-02-06 ENCOUNTER — OUTSIDE FACILITY SERVICE (OUTPATIENT)
Dept: GASTROENTEROLOGY | Facility: CLINIC | Age: 35
End: 2020-02-06

## 2020-02-06 DIAGNOSIS — R10.84 GENERALIZED ABDOMINAL PAIN: ICD-10-CM

## 2020-02-06 DIAGNOSIS — R19.7 DIARRHEA, UNSPECIFIED: ICD-10-CM

## 2020-02-06 PROCEDURE — 88305 TISSUE EXAM BY PATHOLOGIST: CPT | Performed by: INTERNAL MEDICINE

## 2020-02-06 PROCEDURE — 45380 COLONOSCOPY AND BIOPSY: CPT | Performed by: INTERNAL MEDICINE

## 2020-02-06 PROCEDURE — 43239 EGD BIOPSY SINGLE/MULTIPLE: CPT | Performed by: INTERNAL MEDICINE

## 2020-02-28 DIAGNOSIS — F41.9 ANXIETY: ICD-10-CM

## 2020-02-28 DIAGNOSIS — F98.8 ATTENTION DEFICIT DISORDER (ADD) WITHOUT HYPERACTIVITY: ICD-10-CM

## 2020-02-28 DIAGNOSIS — F33.1 MODERATE EPISODE OF RECURRENT MAJOR DEPRESSIVE DISORDER (HCC): ICD-10-CM

## 2020-02-28 DIAGNOSIS — G47.00 INSOMNIA, UNSPECIFIED TYPE: ICD-10-CM

## 2020-02-28 RX ORDER — ZOLPIDEM TARTRATE 10 MG/1
10 TABLET ORAL
Qty: 30 TABLET | Refills: 0 | Status: SHIPPED | OUTPATIENT
Start: 2020-02-28 | End: 2020-03-30

## 2020-02-28 RX ORDER — BUPROPION HYDROCHLORIDE 300 MG/1
300 TABLET ORAL EVERY MORNING
Qty: 30 TABLET | Refills: 2 | Status: SHIPPED | OUTPATIENT
Start: 2020-02-28 | End: 2020-09-08

## 2020-02-28 RX ORDER — DEXTROAMPHETAMINE SACCHARATE, AMPHETAMINE ASPARTATE MONOHYDRATE, DEXTROAMPHETAMINE SULFATE AND AMPHETAMINE SULFATE 7.5; 7.5; 7.5; 7.5 MG/1; MG/1; MG/1; MG/1
30 CAPSULE, EXTENDED RELEASE ORAL EVERY MORNING
Qty: 30 CAPSULE | Refills: 0 | Status: SHIPPED | OUTPATIENT
Start: 2020-02-28 | End: 2020-03-30 | Stop reason: SDUPTHER

## 2020-02-28 RX ORDER — DEXTROAMPHETAMINE SACCHARATE, AMPHETAMINE ASPARTATE, DEXTROAMPHETAMINE SULFATE AND AMPHETAMINE SULFATE 5; 5; 5; 5 MG/1; MG/1; MG/1; MG/1
1 TABLET ORAL DAILY
Qty: 30 TABLET | Refills: 0 | Status: SHIPPED | OUTPATIENT
Start: 2020-02-28 | End: 2020-03-30 | Stop reason: SDUPTHER

## 2020-03-16 RX ORDER — MONTELUKAST SODIUM 4 MG/1
TABLET, CHEWABLE ORAL
Qty: 60 TABLET | Refills: 1 | Status: SHIPPED | OUTPATIENT
Start: 2020-03-16 | End: 2020-04-15

## 2020-03-29 DIAGNOSIS — G47.00 INSOMNIA, UNSPECIFIED TYPE: ICD-10-CM

## 2020-03-30 DIAGNOSIS — F98.8 ATTENTION DEFICIT DISORDER (ADD) WITHOUT HYPERACTIVITY: ICD-10-CM

## 2020-03-30 RX ORDER — ZOLPIDEM TARTRATE 10 MG/1
TABLET ORAL
Qty: 30 TABLET | Refills: 5 | Status: SHIPPED | OUTPATIENT
Start: 2020-03-30 | End: 2020-04-28 | Stop reason: SDUPTHER

## 2020-03-31 RX ORDER — DEXTROAMPHETAMINE SACCHARATE, AMPHETAMINE ASPARTATE, DEXTROAMPHETAMINE SULFATE AND AMPHETAMINE SULFATE 5; 5; 5; 5 MG/1; MG/1; MG/1; MG/1
1 TABLET ORAL DAILY
Qty: 30 TABLET | Refills: 0 | Status: SHIPPED | OUTPATIENT
Start: 2020-03-31 | End: 2020-04-28 | Stop reason: SDUPTHER

## 2020-03-31 RX ORDER — DEXTROAMPHETAMINE SACCHARATE, AMPHETAMINE ASPARTATE MONOHYDRATE, DEXTROAMPHETAMINE SULFATE AND AMPHETAMINE SULFATE 7.5; 7.5; 7.5; 7.5 MG/1; MG/1; MG/1; MG/1
30 CAPSULE, EXTENDED RELEASE ORAL EVERY MORNING
Qty: 30 CAPSULE | Refills: 0 | Status: SHIPPED | OUTPATIENT
Start: 2020-03-31 | End: 2020-04-28 | Stop reason: SDUPTHER

## 2020-04-15 RX ORDER — MONTELUKAST SODIUM 4 MG/1
TABLET, CHEWABLE ORAL
Qty: 60 TABLET | Refills: 1 | Status: SHIPPED | OUTPATIENT
Start: 2020-04-15 | End: 2020-05-11

## 2020-04-24 ENCOUNTER — OFFICE VISIT (OUTPATIENT)
Dept: INTERNAL MEDICINE | Facility: CLINIC | Age: 35
End: 2020-04-24

## 2020-04-24 DIAGNOSIS — F41.8 SITUATIONAL ANXIETY: Primary | ICD-10-CM

## 2020-04-24 PROBLEM — G89.18 POST-OP PAIN: Status: RESOLVED | Noted: 2019-12-18 | Resolved: 2020-04-24

## 2020-04-24 PROBLEM — J18.9 PNEUMONIA OF LEFT LOWER LOBE DUE TO INFECTIOUS ORGANISM: Status: RESOLVED | Noted: 2019-12-18 | Resolved: 2020-04-24

## 2020-04-24 PROBLEM — J18.9 HCAP (HEALTHCARE-ASSOCIATED PNEUMONIA): Status: RESOLVED | Noted: 2019-12-18 | Resolved: 2020-04-24

## 2020-04-24 PROCEDURE — 99441 PR PHYS/QHP TELEPHONE EVALUATION 5-10 MIN: CPT | Performed by: INTERNAL MEDICINE

## 2020-04-24 RX ORDER — ALPRAZOLAM 1 MG/1
1 TABLET ORAL 2 TIMES DAILY PRN
Qty: 30 TABLET | Refills: 2 | Status: SHIPPED | OUTPATIENT
Start: 2020-04-24 | End: 2020-07-16 | Stop reason: SDUPTHER

## 2020-04-24 NOTE — PROGRESS NOTES
Subjective   Sergio Lucero is a 34 y.o. male seen via telephone visit for acute anxiety.  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  He was seeing a psychiatrist at the time and was on Xanax as needed.  He would only take it during very high anxiety days or when he was having a panic attack.  He said it worked well then and he had no negative side effects.  He is also on Wellbutrin but does not really feel like it is doing anything for his anxiety right now.  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  He may potentially even be moving as he is registered to take the bar not only in [place] but also [place] in [place].  It is a period of transition for him only complicated by the pandemic and everything that that entails.  He would like to get another prescription for Xanax to use as needed.  He is currently having panic attacks about twice per week.  They last about 30 minutes and he feels like he is going to die.  It is very scary for him.    I have reviewed the following portions of the patient's history and confirmed they are accurate: current medications, past medical history, past social history and problem list     I have personally completed the patient's review of systems.    Review of Systems:  General: negative  CV: negative  Neuro: negative  Psych: Anxiety    Objective   There were no vitals taken for this visit.    Physical Exam    Assessment/Plan   Sergio was seen today for anxiety.    Diagnoses and all orders for this visit:    Situational anxiety  -     ALPRAZolam (Xanax) 1 MG tablet; Take 1 tablet by mouth 2 (Two) Times a Day As Needed for Anxiety.  -New problem with new medication.  Patient has successfully taken this medication PRN in the past.  Anticipate him taking about 2-3 times per week.  Discussed with patient if he finds it his anxiety is more every day and that he is having to take it more frequently than would probably  add on an additional medication to his Wellbutrin such as an SSRI        You have chosen to receive care through a telephone visit. Do you consent to use a telephone visit for your medical care today? Yes  This visit has been rescheduled as a phone visit to comply with patient safety concerns in accordance with CDC recommendations. Total time of discussion was 7 minutes.           Tea Chiu MA    Please note that portions of this note were completed with a voice recognition program. Efforts were made to edit the dictations, but occasionally words are mistranscribed.

## 2020-04-28 DIAGNOSIS — G47.00 INSOMNIA, UNSPECIFIED TYPE: ICD-10-CM

## 2020-04-28 DIAGNOSIS — F98.8 ATTENTION DEFICIT DISORDER (ADD) WITHOUT HYPERACTIVITY: ICD-10-CM

## 2020-04-28 RX ORDER — ZOLPIDEM TARTRATE 10 MG/1
10 TABLET ORAL
Qty: 30 TABLET | Refills: 5 | Status: SHIPPED | OUTPATIENT
Start: 2020-04-28 | End: 2020-11-02 | Stop reason: SDUPTHER

## 2020-04-28 RX ORDER — DEXTROAMPHETAMINE SACCHARATE, AMPHETAMINE ASPARTATE, DEXTROAMPHETAMINE SULFATE AND AMPHETAMINE SULFATE 5; 5; 5; 5 MG/1; MG/1; MG/1; MG/1
1 TABLET ORAL DAILY
Qty: 30 TABLET | Refills: 0 | Status: SHIPPED | OUTPATIENT
Start: 2020-04-28 | End: 2020-05-20 | Stop reason: SDUPTHER

## 2020-04-28 RX ORDER — DEXTROAMPHETAMINE SACCHARATE, AMPHETAMINE ASPARTATE MONOHYDRATE, DEXTROAMPHETAMINE SULFATE AND AMPHETAMINE SULFATE 7.5; 7.5; 7.5; 7.5 MG/1; MG/1; MG/1; MG/1
30 CAPSULE, EXTENDED RELEASE ORAL EVERY MORNING
Qty: 30 CAPSULE | Refills: 0 | Status: SHIPPED | OUTPATIENT
Start: 2020-04-28 | End: 2020-05-20 | Stop reason: SDUPTHER

## 2020-05-11 RX ORDER — MONTELUKAST SODIUM 4 MG/1
TABLET, CHEWABLE ORAL
Qty: 60 TABLET | Refills: 1 | Status: SHIPPED | OUTPATIENT
Start: 2020-05-11 | End: 2021-01-21 | Stop reason: SDUPTHER

## 2020-05-12 ENCOUNTER — TELEMEDICINE (OUTPATIENT)
Dept: GASTROENTEROLOGY | Facility: CLINIC | Age: 35
End: 2020-05-12

## 2020-05-12 DIAGNOSIS — K52.9 CHRONIC DIARRHEA: Primary | ICD-10-CM

## 2020-05-12 PROCEDURE — 99214 OFFICE O/P EST MOD 30 MIN: CPT | Performed by: INTERNAL MEDICINE

## 2020-05-12 RX ORDER — MONTELUKAST SODIUM 4 MG/1
4 TABLET, CHEWABLE ORAL 2 TIMES DAILY
Qty: 180 TABLET | Refills: 3 | Status: SHIPPED | OUTPATIENT
Start: 2020-05-12 | End: 2020-08-11

## 2020-05-12 NOTE — PROGRESS NOTES
PCP: Noelle Roberts MD    No chief complaint on file.  cc: diarrhea  You have chosen to receive care through a telephone visit. Do you consent to use a telephone visit for your medical care today? Yes      History of Present Illness:   Sergio Lucero is a 34 y.o. male who presents to GI clinic as a follow up for chronic diarrhea. Denies abdominal pain. Denies increase in abdominal girth. Previously was taking 4 g of colestipol and 8 mg of immodium.  Bowel habits were formed. Now having liquid stool around 3 am. No pus or hematochezia.     Past Medical History:   Diagnosis Date   • Acid reflux    • ADHD (attention deficit hyperactivity disorder)    • Anemia    • Anxiety    • Depression    • Fatty liver    • History of ankle fracture     RIGHT   • History of arm fracture     BOTH at separate times   • Insomnia    • Lumbar disc herniation     L4-L5   • Obstructive sleep apnea     on CPAP   • Wears glasses        Past Surgical History:   Procedure Laterality Date   • ADENOIDECTOMY  2009   • ENDOSCOPY N/A 12/19/2019    Procedure: ESOPHAGOGASTRODUODENOSCOPY WITH PYLORIC DILATATION;  Surgeon: Sky Angel MD;  Location:  [facility];  Service: General   • KNEE ARTHROSCOPY Right 2009   • NISSEN FUNDOPLICATION N/A 12/16/2019    Procedure: NISSEN FUNDOPLICATION LAPAROSCOPIC;  Surgeon: Sky Angel MD;  Location:  [facility];  Service: General   • ORIF HUMERUS FRACTURE Right    • PARAESOPHAGEAL HERNIA REPAIR N/A 12/16/2019    Procedure: PARAESOPHAGEAL HERNIA REPAIR LAPAROSCOPIC WITH MESH;  Surgeon: Sky Angel MD;  Location:  [facility];  Service: General   • TONSILLECTOMY  2009   • WISDOM TOOTH EXTRACTION  1998         Current Outpatient Medications:   •  ALPRAZolam (Xanax) 1 MG tablet, Take 1 tablet by mouth 2 (Two) Times a Day As Needed for Anxiety., Disp: 30 tablet, Rfl: 2  •  amphetamine-dextroamphetamine (ADDERALL) 20 MG tablet, Take 1 tablet by mouth Daily., Disp: 30 tablet, Rfl:  0  •  amphetamine-dextroamphetamine XR (Adderall XR) 30 MG 24 hr capsule, Take 1 capsule by mouth Every Morning, Disp: 30 capsule, Rfl: 0  •  buPROPion XL (WELLBUTRIN XL) 300 MG 24 hr tablet, Take 1 tablet by mouth Every Morning., Disp: 30 tablet, Rfl: 2  •  colestipol (COLESTID) 1 g tablet, TAKE 2 TABLETS BY MOUTH TWICE A DAY, Disp: 60 tablet, Rfl: 1  •  HYDROcodone-acetaminophen (NORCO)  MG per tablet, Take 2 tablets by mouth 2 (Two) Times a Day As Needed. 1 - 2 tablets bid as directed, filled 30 day supply on 12-10-19, Disp: , Rfl:   •  loperamide (IMODIUM) 2 MG capsule, Take 1 capsule by mouth 4 (Four) Times a Day As Needed for Diarrhea. May take up to 16 mg in a day, Disp: 120 capsule, Rfl: 3  •  zolpidem (AMBIEN) 10 MG tablet, Take 1 tablet by mouth every night at bedtime., Disp: 30 tablet, Rfl: 5    Allergies   Allergen Reactions   • Fish-Derived Products Anaphylaxis   • Nuts Anaphylaxis   • Other Anaphylaxis     All Beans (mahajan, lima)   • Shellfish Allergy Anaphylaxis   • Iodine Hives     itching       Family History   Problem Relation Age of Onset   • Heart attack Father 56   • Obesity Father    • Cancer Mother    • Obesity Mother    • No Known Problems Daughter    • No Known Problems Son    • No Known Problems Maternal Grandmother    • No Known Problems Maternal Grandfather    • No Known Problems Paternal Grandmother    • No Known Problems Paternal Grandfather        Social History     Socioeconomic History   • Marital status:      Spouse name: Not on file   • Number of children: 4   • Years of education: Not on file   • Highest education level: Not on file   Tobacco Use   • Smoking status: Never Smoker   • Smokeless tobacco: Never Used   Substance and Sexual Activity   • Alcohol use: Yes     Alcohol/week: 4.0 standard drinks     Types: 4 Cans of beer per week     Frequency: 4 or more times a week   • Drug use: No   • Sexual activity: Yes     Partners: Female       Review of Systems  A  complete 12 point ros was asked and is negative except for that mentioned above.  In particular:  No fever  No rash  No increased arthralgias  No worsening edema  No cough  No dyspnea  No chest pain      There were no vitals filed for this visit.    Physical Exam  Unable to perform    Assessment/Plan  1.) Chronic diarrhea  Onset: following nissen fundoplication and paraesophageal hernia repair. Risk factors for c.dif: hospitalization, antibiotics.  Therefore will assess for c.dif and gi panel.  This preceded his diagnosis of the flu.   Workup: colonoscopy, stool studies  Responded to immodium and colestipol    Plan:  Will increase colestipol to 8 g daily while taking 4 g in morning and before dinner.  He can continue prn immodium. He will call in a week an inform how going.        3.) Obesity  4.) Abnormal liver enzymes, suspect fatty liver disease  Consistent with fatty liver.  Will repeat liver enzymes in 6 months to a year.  He is trying to lose weight.      5.) history of anemia, s/p large hiatal hernia with recent fundoplication by dr angel  6.) History of gastric erosions related to herniation  Management per dr. Angel. Repeat egd with healed erosions    Future decision: colonoscopy vs antibiotics.        Aries Sandhu MD  5/12/2020

## 2020-05-20 DIAGNOSIS — F98.8 ATTENTION DEFICIT DISORDER (ADD) WITHOUT HYPERACTIVITY: ICD-10-CM

## 2020-05-20 RX ORDER — LOPERAMIDE HYDROCHLORIDE 2 MG/1
2 CAPSULE ORAL 4 TIMES DAILY PRN
Qty: 120 CAPSULE | Refills: 3 | Status: SHIPPED | OUTPATIENT
Start: 2020-05-20 | End: 2020-10-19

## 2020-05-21 RX ORDER — DEXTROAMPHETAMINE SACCHARATE, AMPHETAMINE ASPARTATE, DEXTROAMPHETAMINE SULFATE AND AMPHETAMINE SULFATE 5; 5; 5; 5 MG/1; MG/1; MG/1; MG/1
1 TABLET ORAL DAILY
Qty: 30 TABLET | Refills: 0 | Status: SHIPPED | OUTPATIENT
Start: 2020-05-21 | End: 2020-06-21 | Stop reason: SDUPTHER

## 2020-05-21 RX ORDER — DEXTROAMPHETAMINE SACCHARATE, AMPHETAMINE ASPARTATE MONOHYDRATE, DEXTROAMPHETAMINE SULFATE AND AMPHETAMINE SULFATE 7.5; 7.5; 7.5; 7.5 MG/1; MG/1; MG/1; MG/1
30 CAPSULE, EXTENDED RELEASE ORAL EVERY MORNING
Qty: 30 CAPSULE | Refills: 0 | Status: SHIPPED | OUTPATIENT
Start: 2020-05-21 | End: 2020-06-21 | Stop reason: SDUPTHER

## 2020-06-21 DIAGNOSIS — F98.8 ATTENTION DEFICIT DISORDER (ADD) WITHOUT HYPERACTIVITY: ICD-10-CM

## 2020-06-22 RX ORDER — DEXTROAMPHETAMINE SACCHARATE, AMPHETAMINE ASPARTATE, DEXTROAMPHETAMINE SULFATE AND AMPHETAMINE SULFATE 5; 5; 5; 5 MG/1; MG/1; MG/1; MG/1
1 TABLET ORAL DAILY
Qty: 30 TABLET | Refills: 0 | Status: SHIPPED | OUTPATIENT
Start: 2020-06-22 | End: 2020-07-20 | Stop reason: SDUPTHER

## 2020-06-22 RX ORDER — DEXTROAMPHETAMINE SACCHARATE, AMPHETAMINE ASPARTATE MONOHYDRATE, DEXTROAMPHETAMINE SULFATE AND AMPHETAMINE SULFATE 7.5; 7.5; 7.5; 7.5 MG/1; MG/1; MG/1; MG/1
30 CAPSULE, EXTENDED RELEASE ORAL EVERY MORNING
Qty: 30 CAPSULE | Refills: 0 | Status: SHIPPED | OUTPATIENT
Start: 2020-06-22 | End: 2020-07-20 | Stop reason: SDUPTHER

## 2020-07-16 DIAGNOSIS — F41.8 SITUATIONAL ANXIETY: ICD-10-CM

## 2020-07-17 RX ORDER — ALPRAZOLAM 1 MG/1
1 TABLET ORAL 2 TIMES DAILY PRN
Qty: 30 TABLET | Refills: 2 | Status: SHIPPED | OUTPATIENT
Start: 2020-07-17 | End: 2020-09-17 | Stop reason: SDUPTHER

## 2020-07-20 DIAGNOSIS — F98.8 ATTENTION DEFICIT DISORDER (ADD) WITHOUT HYPERACTIVITY: ICD-10-CM

## 2020-07-21 RX ORDER — DEXTROAMPHETAMINE SACCHARATE, AMPHETAMINE ASPARTATE MONOHYDRATE, DEXTROAMPHETAMINE SULFATE AND AMPHETAMINE SULFATE 7.5; 7.5; 7.5; 7.5 MG/1; MG/1; MG/1; MG/1
30 CAPSULE, EXTENDED RELEASE ORAL EVERY MORNING
Qty: 30 CAPSULE | Refills: 0 | Status: SHIPPED | OUTPATIENT
Start: 2020-07-21 | End: 2020-08-19 | Stop reason: SDUPTHER

## 2020-07-21 RX ORDER — DEXTROAMPHETAMINE SACCHARATE, AMPHETAMINE ASPARTATE, DEXTROAMPHETAMINE SULFATE AND AMPHETAMINE SULFATE 5; 5; 5; 5 MG/1; MG/1; MG/1; MG/1
1 TABLET ORAL DAILY
Qty: 30 TABLET | Refills: 0 | Status: SHIPPED | OUTPATIENT
Start: 2020-07-21 | End: 2020-08-19 | Stop reason: SDUPTHER

## 2020-08-11 RX ORDER — MONTELUKAST SODIUM 4 MG/1
4 TABLET, CHEWABLE ORAL 2 TIMES DAILY
Qty: 240 TABLET | Refills: 2 | Status: SHIPPED | OUTPATIENT
Start: 2020-08-11 | End: 2020-09-02 | Stop reason: SDUPTHER

## 2020-08-19 DIAGNOSIS — F98.8 ATTENTION DEFICIT DISORDER (ADD) WITHOUT HYPERACTIVITY: ICD-10-CM

## 2020-08-20 RX ORDER — DEXTROAMPHETAMINE SACCHARATE, AMPHETAMINE ASPARTATE, DEXTROAMPHETAMINE SULFATE AND AMPHETAMINE SULFATE 5; 5; 5; 5 MG/1; MG/1; MG/1; MG/1
1 TABLET ORAL DAILY
Qty: 30 TABLET | Refills: 0 | Status: SHIPPED | OUTPATIENT
Start: 2020-08-20 | End: 2020-09-17 | Stop reason: SDUPTHER

## 2020-08-20 RX ORDER — DEXTROAMPHETAMINE SACCHARATE, AMPHETAMINE ASPARTATE MONOHYDRATE, DEXTROAMPHETAMINE SULFATE AND AMPHETAMINE SULFATE 7.5; 7.5; 7.5; 7.5 MG/1; MG/1; MG/1; MG/1
30 CAPSULE, EXTENDED RELEASE ORAL EVERY MORNING
Qty: 30 CAPSULE | Refills: 0 | Status: SHIPPED | OUTPATIENT
Start: 2020-08-20 | End: 2020-09-17 | Stop reason: SDUPTHER

## 2020-08-24 ENCOUNTER — OFFICE VISIT (OUTPATIENT)
Dept: GASTROENTEROLOGY | Facility: CLINIC | Age: 35
End: 2020-08-24

## 2020-08-24 DIAGNOSIS — K52.9 CHRONIC DIARRHEA: Primary | ICD-10-CM

## 2020-08-24 PROCEDURE — 99442 PR PHYS/QHP TELEPHONE EVALUATION 11-20 MIN: CPT | Performed by: INTERNAL MEDICINE

## 2020-08-24 NOTE — PROGRESS NOTES
PCP: Noelle Roberts MD    No chief complaint on file.  cc: diarrhea  You have chosen to receive care through a telehealth visit.  Do you consent to use a video/audio connection for your medical care today? Yes      History of Present Illness:   Sergio Lucero is a 35 y.o. male who presents to GI clinic as a follow up for chronic diarrhea.  To recall he developed diarrhea after fundoplication. Colestipol has helped. 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  He has noticed that he will take 8 tablets a day (1 g tablets). Typically will have 2 meals a day. He is not losing weight. No gib loss.    Past Medical History:   Diagnosis Date   • Acid reflux    • ADHD (attention deficit hyperactivity disorder)    • Anemia    • Anxiety    • Depression    • Fatty liver    • History of ankle fracture     RIGHT   • History of arm fracture     BOTH at separate times   • Insomnia    • Lumbar disc herniation     L4-L5   • Obstructive sleep apnea     on CPAP   • Wears glasses        Past Surgical History:   Procedure Laterality Date   • ADENOIDECTOMY  2009   • ENDOSCOPY N/A 12/19/2019    Procedure: ESOPHAGOGASTRODUODENOSCOPY WITH PYLORIC DILATATION;  Surgeon: Sky Angel MD;  Location: [facility];  Service: General   • KNEE ARTHROSCOPY Right 2009   • NISSEN FUNDOPLICATION N/A 12/16/2019    Procedure: NISSEN FUNDOPLICATION LAPAROSCOPIC;  Surgeon: Sky Angel MD;  Location:  [facility];  Service: General   • ORIF HUMERUS FRACTURE Right    • PARAESOPHAGEAL HERNIA REPAIR N/A 12/16/2019    Procedure: PARAESOPHAGEAL HERNIA REPAIR LAPAROSCOPIC WITH MESH;  Surgeon: Sky Angel MD;  Location:  [facility];  Service: General   • TONSILLECTOMY  2009   • WISDOM TOOTH EXTRACTION  1998         Current Outpatient Medications:   •  ALPRAZolam (Xanax) 1 MG tablet, Take 1 tablet by mouth 2 (Two) Times a Day As Needed for Anxiety., Disp: 30 tablet, Rfl: 2  •   amphetamine-dextroamphetamine (ADDERALL) 20 MG tablet, Take 1 tablet by mouth Daily., Disp: 30 tablet, Rfl: 0  •  amphetamine-dextroamphetamine XR (Adderall XR) 30 MG 24 hr capsule, Take 1 capsule by mouth Every Morning, Disp: 30 capsule, Rfl: 0  •  buPROPion XL (WELLBUTRIN XL) 300 MG 24 hr tablet, Take 1 tablet by mouth Every Morning., Disp: 30 tablet, Rfl: 2  •  colestipol (COLESTID) 1 g tablet, TAKE 2 TABLETS BY MOUTH TWICE A DAY, Disp: 60 tablet, Rfl: 1  •  colestipol (COLESTID) 1 g tablet, TAKE 4 TABLETS BY MOUTH 2 (TWO) TIMES A DAY., Disp: 240 tablet, Rfl: 2  •  HYDROcodone-acetaminophen (NORCO)  MG per tablet, Take 2 tablets by mouth 2 (Two) Times a Day As Needed. 1 - 2 tablets bid as directed, filled 30 day supply on 12-10-19, Disp: , Rfl:   •  loperamide (IMODIUM) 2 MG capsule, TAKE 1 CAPSULE BY MOUTH 4 (FOUR) TIMES A DAY AS NEEDED FOR DIARRHEA. MAY TAKE UP TO 16 MG IN A DAY, Disp: 120 capsule, Rfl: 3  •  zolpidem (AMBIEN) 10 MG tablet, Take 1 tablet by mouth every night at bedtime., Disp: 30 tablet, Rfl: 5    Allergies   Allergen Reactions   • Fish-Derived Products Anaphylaxis   • Nuts Anaphylaxis   • Other Anaphylaxis     All Beans (mahajan, lima)   • Shellfish Allergy Anaphylaxis   • Iodine Hives     itching       Family History   Problem Relation Age of Onset   • Heart attack Father 56   • Obesity Father    • Cancer Mother    • Obesity Mother    • No Known Problems Daughter    • No Known Problems Son    • No Known Problems Maternal Grandmother    • No Known Problems Maternal Grandfather    • No Known Problems Paternal Grandmother    • No Known Problems Paternal Grandfather        Social History     Socioeconomic History   • Marital status:      Spouse name: Not on file   • Number of children: 4   • Years of education: Not on file   • Highest education level: Not on file   Tobacco Use   • Smoking status: Never Smoker   • Smokeless tobacco: Never Used   Substance and Sexual Activity   • Alcohol  use: Yes     Alcohol/week: 4.0 standard drinks     Types: 4 Cans of beer per week     Frequency: 4 or more times a week   • Drug use: No   • Sexual activity: Yes     Partners: Female       Review of Systems    A complete 12 point ros was asked and is negative except for that mentioned above.  In particular:  No fever  No rash  No increased arthralgias  No worsening edema  No cough  No dyspnea  No chest pain    There were no vitals filed for this visit.    Physical Exam  Unable to perform    Assessment/Plan  1.) Chronic diarrhea  Onset: following nissen fundoplication and paraesophageal hernia repair. Risk factors for c.dif: hospitalization, antibiotics.  Therefore will assess for c.dif and gi panel.  This preceded his diagnosis of the flu.   Workup: colonoscopy, stool studies  Responded to immodium and colestipol     Plan:  Will increase colestipol to 16 g daily while taking 4 g with meals (increase meals to 4 x a day from 2x).  He can continue prn immodium. He will call in a week an inform how going.  If not working, will make sure he is taking immodium in addition. may consider repeat stool studies vs adding a therapy (rifaximin, questran)      3.) Obesity  4.) Abnormal liver enzymes, suspect fatty liver disease  Consistent with fatty liver.  Will repeat liver enzymes in 6 months to a year.  He is trying to lose weight.      5.) history of anemia, s/p large hiatal hernia with recent fundoplication by dr angel  6.) History of gastric erosions related to herniation  Management per dr. Angel. Repeat egd with healed erosions    15 minutes devoted to telephone visit           Aries Sandhu MD  8/24/2020

## 2020-09-03 RX ORDER — MONTELUKAST SODIUM 4 MG/1
4 TABLET, CHEWABLE ORAL 2 TIMES DAILY
Qty: 240 TABLET | Refills: 2 | Status: SHIPPED | OUTPATIENT
Start: 2020-09-03 | End: 2020-09-14 | Stop reason: SDUPTHER

## 2020-09-03 NOTE — TELEPHONE ENCOUNTER
Dr. Sandhu  Can the RX qty be adjusted to for Mr. Lucero current dosage of 16 g? He states the qty refilled will not be enough.  Please Advise?  Thank you  Steph

## 2020-09-06 DIAGNOSIS — F33.1 MODERATE EPISODE OF RECURRENT MAJOR DEPRESSIVE DISORDER (HCC): ICD-10-CM

## 2020-09-06 DIAGNOSIS — F41.9 ANXIETY: ICD-10-CM

## 2020-09-08 RX ORDER — BUPROPION HYDROCHLORIDE 300 MG/1
TABLET ORAL
Qty: 30 TABLET | Refills: 2 | Status: SHIPPED | OUTPATIENT
Start: 2020-09-08 | End: 2020-12-02 | Stop reason: SDUPTHER

## 2020-09-14 RX ORDER — MONTELUKAST SODIUM 4 MG/1
4 TABLET, CHEWABLE ORAL 2 TIMES DAILY
Qty: 240 TABLET | Refills: 2 | Status: SHIPPED | OUTPATIENT
Start: 2020-09-14 | End: 2021-01-21 | Stop reason: SDUPTHER

## 2020-09-17 DIAGNOSIS — F41.8 SITUATIONAL ANXIETY: ICD-10-CM

## 2020-09-17 DIAGNOSIS — F98.8 ATTENTION DEFICIT DISORDER (ADD) WITHOUT HYPERACTIVITY: ICD-10-CM

## 2020-09-18 RX ORDER — DEXTROAMPHETAMINE SACCHARATE, AMPHETAMINE ASPARTATE, DEXTROAMPHETAMINE SULFATE AND AMPHETAMINE SULFATE 5; 5; 5; 5 MG/1; MG/1; MG/1; MG/1
1 TABLET ORAL DAILY
Qty: 30 TABLET | Refills: 0 | Status: SHIPPED | OUTPATIENT
Start: 2020-09-18 | End: 2020-10-20 | Stop reason: SDUPTHER

## 2020-09-18 RX ORDER — ALPRAZOLAM 1 MG/1
1 TABLET ORAL 2 TIMES DAILY PRN
Qty: 30 TABLET | Refills: 2 | Status: SHIPPED | OUTPATIENT
Start: 2020-09-18 | End: 2020-12-02 | Stop reason: SDUPTHER

## 2020-09-18 RX ORDER — DEXTROAMPHETAMINE SACCHARATE, AMPHETAMINE ASPARTATE MONOHYDRATE, DEXTROAMPHETAMINE SULFATE AND AMPHETAMINE SULFATE 7.5; 7.5; 7.5; 7.5 MG/1; MG/1; MG/1; MG/1
30 CAPSULE, EXTENDED RELEASE ORAL EVERY MORNING
Qty: 30 CAPSULE | Refills: 0 | Status: SHIPPED | OUTPATIENT
Start: 2020-09-18 | End: 2020-10-20 | Stop reason: SDUPTHER

## 2020-10-14 RX ORDER — MONTELUKAST SODIUM 4 MG/1
4 TABLET, CHEWABLE ORAL 3 TIMES DAILY
Qty: 180 TABLET | Refills: 3 | Status: SHIPPED | OUTPATIENT
Start: 2020-10-14 | End: 2021-01-25 | Stop reason: SDUPTHER

## 2020-10-19 RX ORDER — LOPERAMIDE HYDROCHLORIDE 2 MG/1
2 CAPSULE ORAL 4 TIMES DAILY PRN
Qty: 120 CAPSULE | Refills: 3 | Status: SHIPPED | OUTPATIENT
Start: 2020-10-19 | End: 2021-02-09

## 2020-10-20 DIAGNOSIS — F98.8 ATTENTION DEFICIT DISORDER (ADD) WITHOUT HYPERACTIVITY: ICD-10-CM

## 2020-10-20 RX ORDER — DEXTROAMPHETAMINE SACCHARATE, AMPHETAMINE ASPARTATE, DEXTROAMPHETAMINE SULFATE AND AMPHETAMINE SULFATE 5; 5; 5; 5 MG/1; MG/1; MG/1; MG/1
1 TABLET ORAL DAILY
Qty: 30 TABLET | Refills: 0 | Status: SHIPPED | OUTPATIENT
Start: 2020-10-20 | End: 2020-11-02 | Stop reason: SDUPTHER

## 2020-10-20 RX ORDER — DEXTROAMPHETAMINE SACCHARATE, AMPHETAMINE ASPARTATE MONOHYDRATE, DEXTROAMPHETAMINE SULFATE AND AMPHETAMINE SULFATE 7.5; 7.5; 7.5; 7.5 MG/1; MG/1; MG/1; MG/1
30 CAPSULE, EXTENDED RELEASE ORAL EVERY MORNING
Qty: 30 CAPSULE | Refills: 0 | Status: SHIPPED | OUTPATIENT
Start: 2020-10-20 | End: 2020-11-02 | Stop reason: SDUPTHER

## 2020-11-02 DIAGNOSIS — G47.00 INSOMNIA, UNSPECIFIED TYPE: ICD-10-CM

## 2020-11-02 DIAGNOSIS — F98.8 ATTENTION DEFICIT DISORDER (ADD) WITHOUT HYPERACTIVITY: ICD-10-CM

## 2020-11-03 RX ORDER — DEXTROAMPHETAMINE SACCHARATE, AMPHETAMINE ASPARTATE, DEXTROAMPHETAMINE SULFATE AND AMPHETAMINE SULFATE 5; 5; 5; 5 MG/1; MG/1; MG/1; MG/1
1 TABLET ORAL DAILY
Qty: 30 TABLET | Refills: 0 | Status: SHIPPED | OUTPATIENT
Start: 2020-11-03 | End: 2021-01-25 | Stop reason: SDUPTHER

## 2020-11-03 RX ORDER — DEXTROAMPHETAMINE SACCHARATE, AMPHETAMINE ASPARTATE MONOHYDRATE, DEXTROAMPHETAMINE SULFATE AND AMPHETAMINE SULFATE 7.5; 7.5; 7.5; 7.5 MG/1; MG/1; MG/1; MG/1
30 CAPSULE, EXTENDED RELEASE ORAL EVERY MORNING
Qty: 30 CAPSULE | Refills: 0 | Status: SHIPPED | OUTPATIENT
Start: 2020-11-03 | End: 2021-01-25 | Stop reason: SDUPTHER

## 2020-11-03 RX ORDER — ZOLPIDEM TARTRATE 10 MG/1
10 TABLET ORAL
Qty: 30 TABLET | Refills: 5 | Status: SHIPPED | OUTPATIENT
Start: 2020-11-03 | End: 2021-04-17 | Stop reason: SDUPTHER

## 2020-12-02 DIAGNOSIS — F33.1 MODERATE EPISODE OF RECURRENT MAJOR DEPRESSIVE DISORDER (HCC): ICD-10-CM

## 2020-12-02 DIAGNOSIS — F41.9 ANXIETY: ICD-10-CM

## 2020-12-02 DIAGNOSIS — F41.8 SITUATIONAL ANXIETY: ICD-10-CM

## 2020-12-02 RX ORDER — ALPRAZOLAM 1 MG/1
1 TABLET ORAL 2 TIMES DAILY PRN
Qty: 30 TABLET | Refills: 2 | Status: SHIPPED | OUTPATIENT
Start: 2020-12-02 | End: 2021-01-25 | Stop reason: SDUPTHER

## 2020-12-02 RX ORDER — BUPROPION HYDROCHLORIDE 300 MG/1
300 TABLET ORAL EVERY MORNING
Qty: 30 TABLET | Refills: 5 | Status: SHIPPED | OUTPATIENT
Start: 2020-12-02 | End: 2021-01-21

## 2020-12-17 RX ORDER — ESCITALOPRAM OXALATE 10 MG/1
10 TABLET ORAL DAILY
Qty: 30 TABLET | Refills: 2 | Status: SHIPPED | OUTPATIENT
Start: 2020-12-17 | End: 2021-01-21 | Stop reason: SDUPTHER

## 2021-01-05 ENCOUNTER — PRIOR AUTHORIZATION (OUTPATIENT)
Dept: INTERNAL MEDICINE | Facility: CLINIC | Age: 36
End: 2021-01-05

## 2021-01-05 RX ORDER — EPINEPHRINE 0.3 MG/.3ML
0.3 INJECTION SUBCUTANEOUS ONCE
Qty: 1 EACH | Refills: 11 | Status: SHIPPED | OUTPATIENT
Start: 2021-01-05 | End: 2021-01-25 | Stop reason: SDUPTHER

## 2021-01-05 NOTE — TELEPHONE ENCOUNTER
Attempting Prior Authorization for Epi pen  However unable to find a Diagnosis and ICD10 code for this. Please advise, Thanks    KEY:[id number]

## 2021-01-07 NOTE — TELEPHONE ENCOUNTER
Prior Authorization for E[inephrine 0.3MG/0.3ML auto-injectors completed and sent to plan via Covermymeds. Status pending;   KEY:[id number]

## 2021-01-21 ENCOUNTER — LAB (OUTPATIENT)
Dept: LAB | Facility: HOSPITAL | Age: 36
End: 2021-01-21

## 2021-01-21 ENCOUNTER — OFFICE VISIT (OUTPATIENT)
Dept: INTERNAL MEDICINE | Facility: CLINIC | Age: 36
End: 2021-01-21

## 2021-01-21 VITALS
SYSTOLIC BLOOD PRESSURE: 128 MMHG | BODY MASS INDEX: 33.45 KG/M2 | WEIGHT: 269 LBS | HEIGHT: 75 IN | TEMPERATURE: 98.6 F | DIASTOLIC BLOOD PRESSURE: 82 MMHG

## 2021-01-21 DIAGNOSIS — Z00.00 HEALTH CARE MAINTENANCE: ICD-10-CM

## 2021-01-21 DIAGNOSIS — F41.9 ANXIETY AND DEPRESSION: Primary | ICD-10-CM

## 2021-01-21 DIAGNOSIS — F32.A ANXIETY AND DEPRESSION: Primary | ICD-10-CM

## 2021-01-21 LAB
ALBUMIN SERPL-MCNC: 4.1 G/DL (ref 3.5–5.2)
ALBUMIN/GLOB SERPL: 1.4 G/DL
ALP SERPL-CCNC: 99 U/L (ref 39–117)
ALT SERPL W P-5'-P-CCNC: 25 U/L (ref 1–41)
ANION GAP SERPL CALCULATED.3IONS-SCNC: 9.1 MMOL/L (ref 5–15)
AST SERPL-CCNC: 16 U/L (ref 1–40)
BILIRUB SERPL-MCNC: 0.3 MG/DL (ref 0–1.2)
BUN SERPL-MCNC: 11 MG/DL (ref 6–20)
BUN/CREAT SERPL: 10.5 (ref 7–25)
CALCIUM SPEC-SCNC: 9 MG/DL (ref 8.6–10.5)
CHLORIDE SERPL-SCNC: 104 MMOL/L (ref 98–107)
CHOLEST SERPL-MCNC: 108 MG/DL (ref 0–200)
CO2 SERPL-SCNC: 27.9 MMOL/L (ref 22–29)
CREAT SERPL-MCNC: 1.05 MG/DL (ref 0.76–1.27)
DEPRECATED RDW RBC AUTO: 42.8 FL (ref 37–54)
ERYTHROCYTE [DISTWIDTH] IN BLOOD BY AUTOMATED COUNT: 14.1 % (ref 12.3–15.4)
GFR SERPL CREATININE-BSD FRML MDRD: 79 ML/MIN/1.73
GLOBULIN UR ELPH-MCNC: 2.9 GM/DL
GLUCOSE SERPL-MCNC: 84 MG/DL (ref 65–99)
HCT VFR BLD AUTO: 38.3 % (ref 37.5–51)
HDLC SERPL-MCNC: 31 MG/DL (ref 40–60)
HGB BLD-MCNC: 12.3 G/DL (ref 13–17.7)
LDLC SERPL CALC-MCNC: 60 MG/DL (ref 0–100)
LDLC/HDLC SERPL: 1.93 {RATIO}
MCH RBC QN AUTO: 27.2 PG (ref 26.6–33)
MCHC RBC AUTO-ENTMCNC: 32.1 G/DL (ref 31.5–35.7)
MCV RBC AUTO: 84.5 FL (ref 79–97)
PLATELET # BLD AUTO: 245 10*3/MM3 (ref 140–450)
PMV BLD AUTO: 11 FL (ref 6–12)
POTASSIUM SERPL-SCNC: 3.9 MMOL/L (ref 3.5–5.2)
PROT SERPL-MCNC: 7 G/DL (ref 6–8.5)
RBC # BLD AUTO: 4.53 10*6/MM3 (ref 4.14–5.8)
SODIUM SERPL-SCNC: 141 MMOL/L (ref 136–145)
TRIGL SERPL-MCNC: 86 MG/DL (ref 0–150)
VLDLC SERPL-MCNC: 17 MG/DL (ref 5–40)
WBC # BLD AUTO: 8.49 10*3/MM3 (ref 3.4–10.8)

## 2021-01-21 PROCEDURE — 80053 COMPREHEN METABOLIC PANEL: CPT | Performed by: INTERNAL MEDICINE

## 2021-01-21 PROCEDURE — 85027 COMPLETE CBC AUTOMATED: CPT | Performed by: INTERNAL MEDICINE

## 2021-01-21 PROCEDURE — 99213 OFFICE O/P EST LOW 20 MIN: CPT | Performed by: INTERNAL MEDICINE

## 2021-01-21 PROCEDURE — 80061 LIPID PANEL: CPT | Performed by: INTERNAL MEDICINE

## 2021-01-21 RX ORDER — ESCITALOPRAM OXALATE 10 MG/1
10 TABLET ORAL DAILY
Qty: 90 TABLET | Refills: 3 | Status: SHIPPED | OUTPATIENT
Start: 2021-01-21

## 2021-01-21 RX ORDER — CYCLOBENZAPRINE HCL 10 MG
TABLET ORAL
COMMUNITY

## 2021-01-21 NOTE — PROGRESS NOTES
"Subjective   Sergio Lucero is a 35 y.o. male here for follow-up anxiety and depression. He was started on lexapro about 6 weeks ago and has felt great since. No negative SE. Wife has said he seems much happier and he feels anxiety and depression have both improved. Also on xanax long term. 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 From [place] originally.    I have reviewed the following portions of the patient's history and confirmed they are accurate: current medications, past medical history, past social history and problem list     I have personally completed the patient's review of systems.    Review of Systems:  General: negative  Neuro: negative  Psych: negative    Objective   /82   Temp 98.6 °F (37 °C) (Temporal)   Ht 190.5 cm (75\")   Wt 122 kg (269 lb)   BMI 33.62 kg/m²     Physical Exam  Vitals signs reviewed.   Constitutional:       Appearance: He is well-developed.   Pulmonary:      Effort: Pulmonary effort is normal.   Skin:     General: Skin is warm and dry.   Neurological:      Mental Status: He is alert and oriented to person, place, and time.   Psychiatric:         Behavior: Behavior normal.         Thought Content: Thought content normal.         Judgment: Judgment normal.         Assessment/Plan   Diagnoses and all orders for this visit:    1. Anxiety and depression (Primary)  -     escitalopram (Lexapro) 10 MG tablet; Take 1 tablet by mouth Daily.  Dispense: 90 tablet; Refill: 3  -continue xanax  -The patient has read and signed the [facility] Controlled Substance Contract.  I will continue to see patient for regular follow up appointments.  They are well controlled on their medication.  QUEENIE is updated every 3 months. The patient is aware of the potential for addiction and dependence.  -UDS and queenie always appropriate  -excellent result, continue at present dose    2. Health care maintenance  -     CBC (No Diff)  -     Comprehensive " Metabolic Panel  -     Lipid Panel             Tea Chiu MA    Please note that portions of this note were completed with a voice recognition program. Efforts were made to edit the dictations, but occasionally words are mistranscribed.  Answers for HPI/ROS submitted by the patient on 1/14/2021   What is the primary reason for your visit?: Other  Please describe your symptoms.: 6 week follow up for medication change treating anxiety/depression.  Have you had these symptoms before?: Yes  How long have you been having these symptoms?: Greater than 2 weeks  Please list any medications you are currently taking for this condition.: Lexapro  Please describe any probable cause for these symptoms. : Ongoing condition.

## 2021-01-25 DIAGNOSIS — F41.8 SITUATIONAL ANXIETY: ICD-10-CM

## 2021-01-25 DIAGNOSIS — F98.8 ATTENTION DEFICIT DISORDER (ADD) WITHOUT HYPERACTIVITY: ICD-10-CM

## 2021-01-26 RX ORDER — MONTELUKAST SODIUM 4 MG/1
4 TABLET, CHEWABLE ORAL 3 TIMES DAILY
Qty: 180 TABLET | Refills: 3 | Status: SHIPPED | OUTPATIENT
Start: 2021-01-26 | End: 2021-03-24

## 2021-01-26 RX ORDER — EPINEPHRINE 0.3 MG/.3ML
0.3 INJECTION SUBCUTANEOUS ONCE
Qty: 1 EACH | Refills: 11 | Status: SHIPPED | OUTPATIENT
Start: 2021-01-26 | End: 2021-01-26

## 2021-01-26 RX ORDER — DEXTROAMPHETAMINE SACCHARATE, AMPHETAMINE ASPARTATE MONOHYDRATE, DEXTROAMPHETAMINE SULFATE AND AMPHETAMINE SULFATE 7.5; 7.5; 7.5; 7.5 MG/1; MG/1; MG/1; MG/1
30 CAPSULE, EXTENDED RELEASE ORAL EVERY MORNING
Qty: 30 CAPSULE | Refills: 0 | Status: SHIPPED | OUTPATIENT
Start: 2021-01-26 | End: 2021-02-22 | Stop reason: SDUPTHER

## 2021-01-26 RX ORDER — DEXTROAMPHETAMINE SACCHARATE, AMPHETAMINE ASPARTATE, DEXTROAMPHETAMINE SULFATE AND AMPHETAMINE SULFATE 5; 5; 5; 5 MG/1; MG/1; MG/1; MG/1
1 TABLET ORAL DAILY
Qty: 30 TABLET | Refills: 0 | Status: SHIPPED | OUTPATIENT
Start: 2021-01-26 | End: 2021-02-22 | Stop reason: SDUPTHER

## 2021-01-26 RX ORDER — ALPRAZOLAM 1 MG/1
1 TABLET ORAL 2 TIMES DAILY PRN
Qty: 30 TABLET | Refills: 2 | Status: SHIPPED | OUTPATIENT
Start: 2021-01-26 | End: 2021-03-29

## 2021-02-08 ENCOUNTER — PATIENT MESSAGE (OUTPATIENT)
Dept: INTERNAL MEDICINE | Facility: CLINIC | Age: 36
End: 2021-02-08

## 2021-02-09 RX ORDER — EPINEPHRINE 0.3 MG/.3ML
0.3 INJECTION SUBCUTANEOUS ONCE
Qty: 2 EACH | Refills: 0 | Status: SHIPPED | OUTPATIENT
Start: 2021-02-09 | End: 2021-02-09

## 2021-02-09 RX ORDER — LOPERAMIDE HYDROCHLORIDE 2 MG/1
2 CAPSULE ORAL 4 TIMES DAILY PRN
Qty: 120 CAPSULE | Refills: 3 | Status: SHIPPED | OUTPATIENT
Start: 2021-02-09

## 2021-02-18 ENCOUNTER — TELEPHONE (OUTPATIENT)
Dept: INTERNAL MEDICINE | Facility: CLINIC | Age: 36
End: 2021-02-18

## 2021-02-18 NOTE — TELEPHONE ENCOUNTER
Caller: Sergio Lucero    Relationship: Self    Best call back number: [phone]    What medication are you requesting: EPI-PEN GENERIC    Have you had these symptoms before:    [x] Yes  [] No    Have you been treated for these symptoms before:   [x] Yes  [] No    If a prescription is needed, what is your preferred pharmacy and phone number:   [id number] - [place], [place] - [address]  - [phone]  - [phone]   [phone]     Additional notes:  THE GENERIC DENYING IS DENYING FOR NO PA.  NEW SCRIPT FOR DR PINO.

## 2021-02-18 NOTE — TELEPHONE ENCOUNTER
Called and spoke to gerardo at [facility] and when they run the NDC number that is approved it goes through but they do not have that one in stock. I left message explaining to pt

## 2021-02-22 DIAGNOSIS — F98.8 ATTENTION DEFICIT DISORDER (ADD) WITHOUT HYPERACTIVITY: ICD-10-CM

## 2021-02-22 RX ORDER — DEXTROAMPHETAMINE SACCHARATE, AMPHETAMINE ASPARTATE MONOHYDRATE, DEXTROAMPHETAMINE SULFATE AND AMPHETAMINE SULFATE 7.5; 7.5; 7.5; 7.5 MG/1; MG/1; MG/1; MG/1
30 CAPSULE, EXTENDED RELEASE ORAL EVERY MORNING
Qty: 30 CAPSULE | Refills: 0 | Status: SHIPPED | OUTPATIENT
Start: 2021-02-22

## 2021-02-22 RX ORDER — DEXTROAMPHETAMINE SACCHARATE, AMPHETAMINE ASPARTATE, DEXTROAMPHETAMINE SULFATE AND AMPHETAMINE SULFATE 5; 5; 5; 5 MG/1; MG/1; MG/1; MG/1
1 TABLET ORAL DAILY
Qty: 30 TABLET | Refills: 0 | Status: SHIPPED | OUTPATIENT
Start: 2021-02-22

## 2021-03-24 RX ORDER — MONTELUKAST SODIUM 4 MG/1
4 TABLET, CHEWABLE ORAL 3 TIMES DAILY
Qty: 180 TABLET | Refills: 3 | Status: SHIPPED | OUTPATIENT
Start: 2021-03-24 | End: 2021-04-05

## 2021-03-28 DIAGNOSIS — F41.8 SITUATIONAL ANXIETY: ICD-10-CM

## 2021-03-29 RX ORDER — ALPRAZOLAM 1 MG/1
1 TABLET ORAL 2 TIMES DAILY PRN
Qty: 30 TABLET | Refills: 2 | Status: SHIPPED | OUTPATIENT
Start: 2021-03-29 | End: 2021-04-01 | Stop reason: SDUPTHER

## 2021-04-01 DIAGNOSIS — F41.8 SITUATIONAL ANXIETY: ICD-10-CM

## 2021-04-01 RX ORDER — ALPRAZOLAM 1 MG/1
1 TABLET ORAL 2 TIMES DAILY PRN
Qty: 30 TABLET | Refills: 2 | Status: SHIPPED | OUTPATIENT
Start: 2021-04-01 | End: 2021-06-14

## 2021-04-05 RX ORDER — MONTELUKAST SODIUM 4 MG/1
4 TABLET, CHEWABLE ORAL 3 TIMES DAILY
Qty: 180 TABLET | Refills: 3 | Status: SHIPPED | OUTPATIENT
Start: 2021-04-05 | End: 2021-05-03

## 2021-04-07 ENCOUNTER — HOSPITAL ENCOUNTER (EMERGENCY)
Dept: HOSPITAL 92 - ERS | Age: 36
Discharge: HOME | End: 2021-04-07
Payer: COMMERCIAL

## 2021-04-07 DIAGNOSIS — U07.1: Primary | ICD-10-CM

## 2021-04-07 LAB
ALBUMIN SERPL BCG-MCNC: 3.9 G/DL (ref 3.5–5)
ALP SERPL-CCNC: 84 U/L (ref 40–110)
ALT SERPL W P-5'-P-CCNC: 28 U/L (ref 8–55)
ANION GAP SERPL CALC-SCNC: 12 MMOL/L (ref 10–20)
AST SERPL-CCNC: 13 U/L (ref 5–34)
BASOPHILS # BLD AUTO: 0 THOU/UL (ref 0–0.2)
BASOPHILS NFR BLD AUTO: 0 % (ref 0–1)
BILIRUB SERPL-MCNC: 0.5 MG/DL (ref 0.2–1.2)
BUN SERPL-MCNC: 9 MG/DL (ref 8.9–20.6)
CALCIUM SERPL-MCNC: 8.5 MG/DL (ref 7.8–10.44)
CHLORIDE SERPL-SCNC: 107 MMOL/L (ref 98–107)
CO2 SERPL-SCNC: 25 MMOL/L (ref 22–29)
CREAT CL PREDICTED SERPL C-G-VRATE: 0 ML/MIN (ref 70–130)
EOSINOPHIL # BLD AUTO: 0 THOU/UL (ref 0–0.7)
EOSINOPHIL NFR BLD AUTO: 0.2 % (ref 0–10)
GLOBULIN SER CALC-MCNC: 3.1 G/DL (ref 2.4–3.5)
GLUCOSE SERPL-MCNC: 113 MG/DL (ref 70–105)
HGB BLD-MCNC: 12.8 G/DL (ref 14–18)
LYMPHOCYTES # BLD: 1 THOU/UL (ref 1.2–3.4)
LYMPHOCYTES NFR BLD AUTO: 10.1 % (ref 21–51)
MCH RBC QN AUTO: 28 PG (ref 27–31)
MCV RBC AUTO: 85.1 FL (ref 78–98)
MONOCYTES # BLD AUTO: 0.6 THOU/UL (ref 0.11–0.59)
MONOCYTES NFR BLD AUTO: 6.2 % (ref 0–10)
NEUTROPHILS # BLD AUTO: 8.2 THOU/UL (ref 1.4–6.5)
NEUTROPHILS NFR BLD AUTO: 83.5 % (ref 42–75)
PLATELET # BLD AUTO: 367 THOU/UL (ref 130–400)
POTASSIUM SERPL-SCNC: 3.4 MMOL/L (ref 3.5–5.1)
RBC # BLD AUTO: 4.56 MILL/UL (ref 4.7–6.1)
SODIUM SERPL-SCNC: 141 MMOL/L (ref 136–145)
WBC # BLD AUTO: 9.8 THOU/UL (ref 4.8–10.8)

## 2021-04-07 PROCEDURE — 85025 COMPLETE CBC W/AUTO DIFF WBC: CPT

## 2021-04-07 PROCEDURE — 84484 ASSAY OF TROPONIN QUANT: CPT

## 2021-04-07 PROCEDURE — 36415 COLL VENOUS BLD VENIPUNCTURE: CPT

## 2021-04-07 PROCEDURE — 71275 CT ANGIOGRAPHY CHEST: CPT

## 2021-04-07 PROCEDURE — 80053 COMPREHEN METABOLIC PANEL: CPT

## 2021-04-07 PROCEDURE — 83880 ASSAY OF NATRIURETIC PEPTIDE: CPT

## 2021-04-17 DIAGNOSIS — G47.00 INSOMNIA, UNSPECIFIED TYPE: ICD-10-CM

## 2021-04-19 RX ORDER — ZOLPIDEM TARTRATE 10 MG/1
10 TABLET ORAL
Qty: 30 TABLET | Refills: 5 | Status: SHIPPED | OUTPATIENT
Start: 2021-04-19

## 2021-05-03 RX ORDER — MONTELUKAST SODIUM 4 MG/1
TABLET, CHEWABLE ORAL
Qty: 240 TABLET | Refills: 1 | Status: SHIPPED | OUTPATIENT
Start: 2021-05-03 | End: 2021-05-07

## 2021-05-07 RX ORDER — MONTELUKAST SODIUM 4 MG/1
TABLET, CHEWABLE ORAL
Qty: 720 TABLET | Refills: 2 | Status: SHIPPED | OUTPATIENT
Start: 2021-05-07 | End: 2022-09-20 | Stop reason: SDUPTHER

## 2021-06-12 DIAGNOSIS — F41.8 SITUATIONAL ANXIETY: ICD-10-CM

## 2021-06-14 RX ORDER — ALPRAZOLAM 1 MG/1
TABLET ORAL
Qty: 30 TABLET | Refills: 2 | Status: SHIPPED | OUTPATIENT
Start: 2021-06-14

## 2021-12-11 DIAGNOSIS — F41.8 SITUATIONAL ANXIETY: ICD-10-CM

## 2021-12-13 RX ORDER — ALPRAZOLAM 1 MG/1
TABLET ORAL
Qty: 30 TABLET | OUTPATIENT
Start: 2021-12-13

## 2022-09-20 RX ORDER — MONTELUKAST SODIUM 4 MG/1
4 TABLET, CHEWABLE ORAL 2 TIMES DAILY
Qty: 720 TABLET | Refills: 2 | Status: SHIPPED | OUTPATIENT
Start: 2022-09-20

## 2023-07-19 NOTE — TELEPHONE ENCOUNTER
Left message for patient to return my call.      ----- Message from Kacey Cole MD sent at 10/28/2019 12:01 PM EDT -----  See mychart comment- please contact patient and reschedule follow up    
[Negative] : Heme/Lymph

## 2025-04-02 NOTE — TELEPHONE ENCOUNTER
done   [Time Spent: ___ minutes] : I have spent [unfilled] minutes of time on the encounter which excludes teaching and separately reported services.

## (undated) DEVICE — DRN PENRS 1/2X18IN LTX

## (undated) DEVICE — TROCAR: Brand: KII FIOS FIRST ENTRY

## (undated) DEVICE — GOWN,NON-REINFORCED,SIRUS,SET IN SLV,XXL: Brand: MEDLINE

## (undated) DEVICE — SUT ETHLN 2/0 PS 18IN 585H

## (undated) DEVICE — COVER,LIGHT HANDLE,FLX,1/PK: Brand: MEDLINE INDUSTRIES, INC.

## (undated) DEVICE — [HIGH FLOW INSUFFLATOR,  DO NOT USE IF PACKAGE IS DAMAGED,  KEEP DRY,  KEEP AWAY FROM SUNLIGHT,  PROTECT FROM HEAT AND RADIOACTIVE SOURCES.]: Brand: PNEUMOSURE

## (undated) DEVICE — ENDOPATH XCEL UNIVERSAL TROCAR STABLILITY SLEEVES: Brand: ENDOPATH XCEL

## (undated) DEVICE — ESOPHAGEAL/PYLORIC/COLONIC/BILIARY WIREGUIDED BALLOON DILATATION CATHETER: Brand: CRE™ PRO

## (undated) DEVICE — SHT AIR TRANSFR COMFRT GLIDE LT LAT 40X80IN

## (undated) DEVICE — SKIN AFFIX SURG ADHESIVE 72/CS 0.55ML: Brand: MEDLINE

## (undated) DEVICE — THE BITE BLOCK MAXI, LATEX FREE STRAP IS USED TO PROTECT THE ENDOSCOPE INSERTION TUBE FROM BEING BITTEN BY THE PATIENT.

## (undated) DEVICE — PK BARIATRIC 10

## (undated) DEVICE — SUT SILK 2/0 SH 30IN K833H

## (undated) DEVICE — INTENDED USE FOR SURGICAL MARKING ON INTACT SKIN, ALSO PROVIDES A PERMANENT METHOD OF IDENTIFYING OBJECTS IN THE OPERATING ROOM: Brand: WRITESITE® REGULAR TIP SKIN MARKER

## (undated) DEVICE — GLV SURG SENSICARE MICRO PF LF 9 STRL

## (undated) DEVICE — UNDRPD COMFRT GLD DRYPAD 36X57IN

## (undated) DEVICE — FLTR PLUMEPORT LAP W/CONN STRL

## (undated) DEVICE — DEV INFL ALLIANCE2 SYS

## (undated) DEVICE — ENDOPATH XCEL BLADELESS TROCARS WITH STABILITY SLEEVES: Brand: ENDOPATH XCEL

## (undated) DEVICE — CONTN GRAD MEAS TRIANG 32OZ BLK

## (undated) DEVICE — JP PERF DRN SIL FLT 10MM FULL: Brand: CARDINAL HEALTH

## (undated) DEVICE — APPL HEMOS FOR DELIVERY FLOSEAL

## (undated) DEVICE — JACKSON-PRATT 100CC BULB RESERVOIR: Brand: CARDINAL HEALTH

## (undated) DEVICE — SINGLE-USE BIOPSY FORCEPS: Brand: RADIAL JAW 4

## (undated) DEVICE — SUT SILK 0 SH 30IN K834H

## (undated) DEVICE — Device: Brand: DEFENDO AIR/WATER/SUCTION AND BIOPSY VALVE

## (undated) DEVICE — GLV SURG SENSICARE MICRO PF LF 8.5 STRL